# Patient Record
Sex: FEMALE | Race: WHITE | Employment: OTHER | URBAN - METROPOLITAN AREA
[De-identification: names, ages, dates, MRNs, and addresses within clinical notes are randomized per-mention and may not be internally consistent; named-entity substitution may affect disease eponyms.]

---

## 2018-11-12 ENCOUNTER — HOSPITAL ENCOUNTER (EMERGENCY)
Age: 73
Discharge: HOME | End: 2018-11-12
Payer: MEDICARE

## 2018-11-12 VITALS
WEIGHT: 150 LBS | BODY MASS INDEX: 31.49 KG/M2 | RESPIRATION RATE: 16 BRPM | DIASTOLIC BLOOD PRESSURE: 78 MMHG | HEIGHT: 58 IN | TEMPERATURE: 98.3 F | OXYGEN SATURATION: 98 % | SYSTOLIC BLOOD PRESSURE: 165 MMHG | HEART RATE: 91 BPM

## 2018-11-12 DIAGNOSIS — B02.9 HERPES ZOSTER WITHOUT COMPLICATION: Primary | ICD-10-CM

## 2018-11-12 PROCEDURE — 99282 EMERGENCY DEPT VISIT SF MDM: CPT

## 2018-11-12 RX ORDER — HYDROCODONE BITARTRATE AND ACETAMINOPHEN 5; 325 MG/1; MG/1
1 TABLET ORAL EVERY 6 HOURS PRN
Qty: 20 TABLET | Refills: 0 | Status: SHIPPED | OUTPATIENT
Start: 2018-11-12 | End: 2018-11-17

## 2018-11-12 RX ORDER — VALACYCLOVIR HYDROCHLORIDE 1 G/1
1000 TABLET, FILM COATED ORAL 3 TIMES DAILY
Qty: 30 TABLET | Refills: 0 | Status: SHIPPED | OUTPATIENT
Start: 2018-11-12 | End: 2018-11-22

## 2018-11-12 ASSESSMENT — PAIN DESCRIPTION - LOCATION: LOCATION: RIB CAGE

## 2018-11-12 ASSESSMENT — PAIN DESCRIPTION - PAIN TYPE: TYPE: ACUTE PAIN

## 2018-11-12 ASSESSMENT — PAIN DESCRIPTION - FREQUENCY: FREQUENCY: CONTINUOUS

## 2018-11-12 ASSESSMENT — PAIN DESCRIPTION - DESCRIPTORS: DESCRIPTORS: ACHING;ITCHING

## 2018-11-12 ASSESSMENT — PAIN SCALES - GENERAL: PAINLEVEL_OUTOF10: 8

## 2018-11-12 ASSESSMENT — PAIN DESCRIPTION - ORIENTATION: ORIENTATION: LEFT

## 2018-11-12 NOTE — ED PROVIDER NOTES
[facility]     Department of Emergency Medicine   ED  Provider Note  Admit Date/RoomTime: 11/12/2018  9:50 AM  ED Room: 29/29    CHIEF COMPLAINT:   Chief Complaint   Patient presents with    Rash     Started Monday/tuesday, rash on left side ribs with pain at area, itchy     ---------------------------------HISTORY OF PRESENT ILLNESS-----------------------------------     Corinna Guerin is a 68 y.o. female presenting to the ED for rash to left lateral chest that began a couple days ago. Patient states she first had pain to the area and then noticed a red rash. She describes the rash as painful and itchy. She denies any new soaps, lotions, detergent,s or medication. She has no chest pain, SOB, abdominal pain, nausea, vomiting, diarrhea, fever/chills, neck pain, sore throat, cough, or recent trauma/injury. She states she did have \"a cold\" at the beginning of this week. Patient is alert and oriented x3 and in no apparent distress. She is nontoxic appearing. Review of Systems:   Pertinent positives and negatives are stated within HPI, all other systems reviewed and are negative.    --------------------------------------------- PAST HISTORY ---------------------------------------------    Past Medical History:  has no past medical history on file. Past Surgical History:  has a past surgical history that includes Tonsillectomy and adenoidectomy. Social History:  reports that she has never smoked. She has never used smokeless tobacco. She reports that she does not drink alcohol or use drugs. Family History: family history is not on file. The patients home medications have been reviewed.     Allergies: Poultry meal and Pcn [penicillins]  Allergies have been reviewed with patient.     -------------------------------------------------- RESULTS -------------------------------------------------  All laboratory and radiology results have been personally reviewed by myself   LABS:  No results found for this visit on 11/12/18. RADIOLOGY:  Interpreted by Radiologist.  No orders to display     ------------------------- NURSING NOTES AND VITALS REVIEWED ---------------------------  The nursing notes within the ED encounter and vital signs as below have been reviewed. BP (!) 165/78   Pulse 91   Temp 98.3 °F (36.8 °C) (Oral)   Resp 16   Ht 4' 10\" (1.473 m)   Wt 150 lb (68 kg)   SpO2 98%   BMI 31.35 kg/m²   Oxygen Saturation Interpretation: Normal    ---------------------------------------------------PHYSICAL EXAM--------------------------------------    Constitutional/General: Alert and oriented x3, well appearing, NAD  HEENT: NC/AT, EOMI, Airway patent  Neck: Supple. Non-tender with no lymphadenopathy   CVS: Regular rate and rhythm  Resp: Clear and equal bilaterally with good airflow, no distress  Abdomen:  Abdomen soft, nontender, No guarding or rigidity   Back:  No midline tenderness. No CVA tenderness. Musculo: Moves all extremities x 4. Warm and well perfused, No edema   Integument:  Normal turgor. Warm, dry, herpetic rash noted to left flank and under left breast, rash renetta not extend past midline, pain with touch, no discharge   Neurological:  Motor functions intact. GCS 15, CN II-XII grossly intact     ------------------------------ ED COURSE/MEDICAL DECISION MAKING----------------------  ED Medications:  Medications - No data to display    Procedures:  None    Consultations:   None    Counseling: The emergency provider has spoken with the patient/caregiver and discussed todays results, in addition to providing specific details for the plan of care and counseling regarding the diagnosis and prognosis. Questions are answered at this time and they are agreeable with the plan. All results reviewed with pt and all questions answered. Patient understands that they must follow-up with PCP. Patient was advised to return to ED if symptoms worsen or new symptoms develop.  Pt remained nontoxic, afebrile, and A&O x4 during this ED visit. They agreed with plan of care, discharge, and importance of follow-up. Pt was in no distress at discharge. Vitals stable. Patient was educated on newly prescribed medication.      --------------------------------- IMPRESSION AND DISPOSITION ---------------------------------    IMPRESSION  1. Herpes zoster without complication      DISPOSITION  Discharge to home  Patient condition is good    NEW MEDICATIONS   Discharge Medication List as of 11/12/2018 10:27 AM      START taking these medications    Details   valACYclovir (VALTREX) 1 g tablet Take 1 tablet by mouth 3 times daily for 10 days, Disp-30 tablet, R-0Print      HYDROcodone-acetaminophen (NORCO) 5-325 MG per tablet Take 1 tablet by mouth every 6 hours as needed for Pain for up to 5 days. ., Disp-20 tablet, R-0Print             NOTE: This report was transcribed using voice recognition software.  Every effort was made to ensure accuracy; however, inadvertent computerized transcription errors may be present    END OF PROVIDER NOTE         Luis Hagan PA-C  11/12/18 [id number]

## 2018-11-20 ENCOUNTER — OFFICE VISIT (OUTPATIENT)
Dept: FAMILY MEDICINE CLINIC | Age: 73
End: 2018-11-20
Payer: MEDICARE

## 2018-11-20 VITALS
WEIGHT: 160 LBS | OXYGEN SATURATION: 99 % | SYSTOLIC BLOOD PRESSURE: 184 MMHG | RESPIRATION RATE: 16 BRPM | DIASTOLIC BLOOD PRESSURE: 86 MMHG | HEART RATE: 94 BPM | BODY MASS INDEX: 33.58 KG/M2 | HEIGHT: 58 IN

## 2018-11-20 DIAGNOSIS — Z12.11 SCREENING FOR MALIGNANT NEOPLASM OF COLON: ICD-10-CM

## 2018-11-20 DIAGNOSIS — Z00.00 ROUTINE PHYSICAL EXAMINATION: ICD-10-CM

## 2018-11-20 DIAGNOSIS — Z12.39 SCREENING FOR MALIGNANT NEOPLASM OF BREAST: ICD-10-CM

## 2018-11-20 DIAGNOSIS — Z13.220 SCREENING FOR LIPID DISORDERS: ICD-10-CM

## 2018-11-20 DIAGNOSIS — Z13.29 SCREENING FOR THYROID DISORDER: ICD-10-CM

## 2018-11-20 DIAGNOSIS — B02.9 HERPES ZOSTER WITHOUT COMPLICATION: Primary | ICD-10-CM

## 2018-11-20 PROCEDURE — 99203 OFFICE O/P NEW LOW 30 MIN: CPT | Performed by: FAMILY MEDICINE

## 2018-11-20 PROCEDURE — 82274 ASSAY TEST FOR BLOOD FECAL: CPT | Performed by: FAMILY MEDICINE

## 2018-11-20 ASSESSMENT — ENCOUNTER SYMPTOMS
CONSTIPATION: 0
BLOOD IN STOOL: 0
DIARRHEA: 0
COUGH: 0
SHORTNESS OF BREATH: 0
NAUSEA: 0
VOMITING: 0
ABDOMINAL PAIN: 0

## 2018-11-20 ASSESSMENT — PATIENT HEALTH QUESTIONNAIRE - PHQ9
SUM OF ALL RESPONSES TO PHQ9 QUESTIONS 1 & 2: 1
2. FEELING DOWN, DEPRESSED OR HOPELESS: 0
SUM OF ALL RESPONSES TO PHQ QUESTIONS 1-9: 1
SUM OF ALL RESPONSES TO PHQ QUESTIONS 1-9: 1
1. LITTLE INTEREST OR PLEASURE IN DOING THINGS: 1

## 2018-12-10 ENCOUNTER — HOSPITAL ENCOUNTER (OUTPATIENT)
Age: 73
Discharge: HOME | End: 2018-12-10
Payer: MEDICARE

## 2018-12-10 ENCOUNTER — HOSPITAL ENCOUNTER (EMERGENCY)
Age: 73
Discharge: HOME | End: 2018-12-10
Payer: MEDICARE

## 2018-12-10 VITALS
HEIGHT: 58 IN | TEMPERATURE: 98.2 F | HEART RATE: 88 BPM | BODY MASS INDEX: 34.85 KG/M2 | OXYGEN SATURATION: 97 % | SYSTOLIC BLOOD PRESSURE: 184 MMHG | DIASTOLIC BLOOD PRESSURE: 88 MMHG | WEIGHT: 166 LBS | RESPIRATION RATE: 18 BRPM

## 2018-12-10 DIAGNOSIS — E78.2 MIXED HYPERLIPIDEMIA: Primary | ICD-10-CM

## 2018-12-10 DIAGNOSIS — Z13.29 SCREENING FOR THYROID DISORDER: ICD-10-CM

## 2018-12-10 DIAGNOSIS — Z00.00 ROUTINE PHYSICAL EXAMINATION: ICD-10-CM

## 2018-12-10 DIAGNOSIS — Z13.220 SCREENING FOR LIPID DISORDERS: ICD-10-CM

## 2018-12-10 DIAGNOSIS — I10 ASYMPTOMATIC HYPERTENSION: Primary | ICD-10-CM

## 2018-12-10 LAB
ALBUMIN SERPL-MCNC: 4.4 G/DL (ref 3.5–5.2)
ALP BLD-CCNC: 97 U/L (ref 35–104)
ALT SERPL-CCNC: 18 U/L (ref 0–32)
ANION GAP SERPL CALCULATED.3IONS-SCNC: 14 MMOL/L (ref 7–16)
AST SERPL-CCNC: 24 U/L (ref 0–31)
BILIRUB SERPL-MCNC: 0.5 MG/DL (ref 0–1.2)
BUN BLDV-MCNC: 15 MG/DL (ref 8–23)
CALCIUM SERPL-MCNC: 9.5 MG/DL (ref 8.6–10.2)
CHLORIDE BLD-SCNC: 101 MMOL/L (ref 98–107)
CHOLESTEROL, TOTAL: 227 MG/DL (ref 0–199)
CO2: 25 MMOL/L (ref 22–29)
CREAT SERPL-MCNC: 0.8 MG/DL (ref 0.5–1)
GFR AFRICAN AMERICAN: >60
GFR NON-AFRICAN AMERICAN: >60 ML/MIN/1.73
GLUCOSE BLD-MCNC: 114 MG/DL (ref 74–99)
HCT VFR BLD CALC: 39.5 % (ref 34–48)
HDLC SERPL-MCNC: 59 MG/DL
HEMOGLOBIN: 12.7 G/DL (ref 11.5–15.5)
LDL CHOLESTEROL CALCULATED: 144 MG/DL (ref 0–99)
MCH RBC QN AUTO: 27.4 PG (ref 26–35)
MCHC RBC AUTO-ENTMCNC: 32.2 % (ref 32–34.5)
MCV RBC AUTO: 85.3 FL (ref 80–99.9)
PDW BLD-RTO: 16.7 FL (ref 11.5–15)
PLATELET # BLD: 218 E9/L (ref 130–450)
PMV BLD AUTO: 12.6 FL (ref 7–12)
POTASSIUM SERPL-SCNC: 3.4 MMOL/L (ref 3.5–5)
RBC # BLD: 4.63 E12/L (ref 3.5–5.5)
SODIUM BLD-SCNC: 140 MMOL/L (ref 132–146)
T4 TOTAL: 8.6 MCG/DL (ref 4.5–11.7)
TOTAL PROTEIN: 8 G/DL (ref 6.4–8.3)
TRIGL SERPL-MCNC: 120 MG/DL (ref 0–149)
TSH SERPL DL<=0.05 MIU/L-ACNC: 3.26 UIU/ML (ref 0.27–4.2)
VLDLC SERPL CALC-MCNC: 24 MG/DL
WBC # BLD: 6.9 E9/L (ref 4.5–11.5)

## 2018-12-10 PROCEDURE — 80061 LIPID PANEL: CPT

## 2018-12-10 PROCEDURE — 36415 COLL VENOUS BLD VENIPUNCTURE: CPT

## 2018-12-10 PROCEDURE — 85027 COMPLETE CBC AUTOMATED: CPT

## 2018-12-10 PROCEDURE — 99283 EMERGENCY DEPT VISIT LOW MDM: CPT

## 2018-12-10 PROCEDURE — 84436 ASSAY OF TOTAL THYROXINE: CPT

## 2018-12-10 PROCEDURE — 84443 ASSAY THYROID STIM HORMONE: CPT

## 2018-12-10 PROCEDURE — 80053 COMPREHEN METABOLIC PANEL: CPT

## 2018-12-10 RX ORDER — ATORVASTATIN CALCIUM 20 MG/1
20 TABLET, FILM COATED ORAL DAILY
Qty: 30 TABLET | Refills: 0 | Status: SHIPPED | OUTPATIENT
Start: 2018-12-10 | End: 2019-01-11 | Stop reason: SDUPTHER

## 2018-12-10 NOTE — ED NOTES
Patient discharged with belongings. Discussed care instructions, follow-up instructions and when to return to the hospital. Patient verbalizes understanding and has no further questions at this time. Electronically signed by Perla Keith RN on 12/10/2018 at [address] [place], [place] [address]STACEY  12/10/18 [id number]

## 2018-12-10 NOTE — ED PROVIDER NOTES
mother. Allergies: Poultry meal; Hydrocodone-acetaminophen; and Pcn [penicillins]    Physical Exam           ED Triage Vitals   BP Temp Temp Source Pulse Resp SpO2 Height Weight   12/10/18 1555 12/10/18 1554 12/10/18 1554 12/10/18 1554 12/10/18 1554 12/10/18 1554 12/10/18 1554 12/10/18 1554   (!) 191/77 98.2 °F (36.8 °C) Oral 100 18 97 % 4' 10\" (1.473 m) 166 lb (75.3 kg)      Oxygen Saturation Interpretation: Normal.    Constitutional:  Alert, development consistent with age. Eyes:  PERRL, EOMI, no discharge or conjunctival injection. Ears:  External ears without lesions. Throat:   Airway patient. Neck:  Normal ROM. Supple. Respiratory:  Clear to auscultation and breath sounds equal.  CV:  Regular rate and rhythm, normal heart sounds, without pathological murmurs, ectopy, gallops, or rubs. GI:  Abdomen Soft, nontender, good bowel sounds. No firm or pulsatile mass. Back:  No costovertebral tenderness. Integument:  Normal turgor. Warm, dry, without visible rash, unless noted elsewhere. Lymphatics: No lymphangitis or adenopathy noted. Neurological:  Oriented. Motor functions intact. CN 2-12 intact    Lab / Imaging Results   (All laboratory and radiology results have been personally reviewed by myself)  Labs:  No results found for this visit on 12/10/18. Imaging: All Radiology results interpreted by Radiologist unless otherwise noted. No orders to display     ED Course / Medical Decision Making   Medications - No data to display    Consultations:             None    Procedures:   none    MDM:  Pt was rechecked and BP was 184/88. Pt completely asymptomatic. Blood work was drawn today and kidney function was normal. I reviewed her chart from PCP visit 3 weeks ago and they stated they would be addressing her BP at the next visit. At this time, HTN will not be lowered acutely and patient is stable for discahrge and was advised to follow up with PCP. She has an appointment in 4 days.  I advised her to call

## 2018-12-11 ENCOUNTER — HOSPITAL ENCOUNTER (OUTPATIENT)
Dept: MAMMOGRAPHY | Age: 73
Discharge: HOME | End: 2018-12-13
Payer: MEDICARE

## 2018-12-11 DIAGNOSIS — Z12.39 SCREENING FOR MALIGNANT NEOPLASM OF BREAST: ICD-10-CM

## 2018-12-11 DIAGNOSIS — Z12.31 ENCOUNTER FOR SCREENING MAMMOGRAM FOR MALIGNANT NEOPLASM OF BREAST: ICD-10-CM

## 2018-12-11 PROCEDURE — 77067 SCR MAMMO BI INCL CAD: CPT

## 2018-12-13 ENCOUNTER — TELEPHONE (OUTPATIENT)
Dept: ONCOLOGY | Age: 73
End: 2018-12-13

## 2018-12-14 ENCOUNTER — OFFICE VISIT (OUTPATIENT)
Dept: FAMILY MEDICINE CLINIC | Age: 73
End: 2018-12-14
Payer: MEDICARE

## 2018-12-14 VITALS
BODY MASS INDEX: 34.43 KG/M2 | DIASTOLIC BLOOD PRESSURE: 74 MMHG | OXYGEN SATURATION: 99 % | HEART RATE: 102 BPM | SYSTOLIC BLOOD PRESSURE: 166 MMHG | RESPIRATION RATE: 12 BRPM | HEIGHT: 58 IN | WEIGHT: 164 LBS | TEMPERATURE: 98.3 F

## 2018-12-14 DIAGNOSIS — I10 ESSENTIAL HYPERTENSION: ICD-10-CM

## 2018-12-14 DIAGNOSIS — E87.6 HYPOKALEMIA: ICD-10-CM

## 2018-12-14 DIAGNOSIS — B02.9 HERPES ZOSTER WITHOUT COMPLICATION: Primary | ICD-10-CM

## 2018-12-14 LAB
CONTROL: NORMAL
HEMOCCULT STL QL: NEGATIVE

## 2018-12-14 PROCEDURE — 99213 OFFICE O/P EST LOW 20 MIN: CPT | Performed by: FAMILY MEDICINE

## 2018-12-14 RX ORDER — CHLORTHALIDONE 25 MG/1
25 TABLET ORAL DAILY
Qty: 30 TABLET | Refills: 3 | Status: SHIPPED | OUTPATIENT
Start: 2018-12-14 | End: 2019-03-31 | Stop reason: SDUPTHER

## 2018-12-14 RX ORDER — POTASSIUM CHLORIDE 20 MEQ/1
20 TABLET, EXTENDED RELEASE ORAL DAILY
Qty: 30 TABLET | Refills: 5 | Status: SHIPPED | OUTPATIENT
Start: 2018-12-14 | End: 2019-01-07 | Stop reason: SDUPTHER

## 2018-12-14 ASSESSMENT — ENCOUNTER SYMPTOMS
COUGH: 0
NAUSEA: 0
CONSTIPATION: 0
ABDOMINAL PAIN: 0
VOMITING: 0
SHORTNESS OF BREATH: 0
DIARRHEA: 0

## 2018-12-14 NOTE — PATIENT INSTRUCTIONS
1) start Chlorthalidone 25 mg daily  2) start potassium 20 mEq daily  3) recheck blood pressure in 2 weeks at nursing visit   4) return to office in 1 month     Patient Education        High Blood Pressure: Care Instructions  Your Care Instructions    If your blood pressure is usually above 130/80, you have high blood pressure, or hypertension. That means the top number is 130 or higher or the bottom number is 80 or higher, or both. Despite what a lot of people think, high blood pressure usually doesn't cause headaches or make you feel dizzy or lightheaded. It usually has no symptoms. But it does increase your risk for heart attack, stroke, and kidney or eye damage. The higher your blood pressure, the more your risk increases. Your doctor will give you a goal for your blood pressure. Your goal will be based on your health and your age. Lifestyle changes, such as eating healthy and being active, are always important to help lower blood pressure. You might also take medicine to reach your blood pressure goal.  Follow-up care is a key part of your treatment and safety. Be sure to make and go to all appointments, and call your doctor if you are having problems. It's also a good idea to know your test results and keep a list of the medicines you take. How can you care for yourself at home? Medical treatment  · If you stop taking your medicine, your blood pressure will go back up. You may take one or more types of medicine to lower your blood pressure. Be safe with medicines. Take your medicine exactly as prescribed. Call your doctor if you think you are having a problem with your medicine. · Talk to your doctor before you start taking aspirin every day. Aspirin can help certain people lower their risk of a heart attack or stroke. But taking aspirin isn't right for everyone, because it can cause serious bleeding. · See your doctor regularly.  You may need to see the doctor more often at first or until your blood of this information.

## 2018-12-15 ENCOUNTER — TELEPHONE (OUTPATIENT)
Dept: FAMILY MEDICINE CLINIC | Age: 73
End: 2018-12-15

## 2018-12-18 ENCOUNTER — HOSPITAL ENCOUNTER (OUTPATIENT)
Dept: GENERAL RADIOLOGY | Age: 73
Discharge: HOME | End: 2018-12-20
Payer: MEDICARE

## 2018-12-18 DIAGNOSIS — R92.1 CALCIFICATION OF LEFT BREAST: ICD-10-CM

## 2018-12-18 PROCEDURE — 77065 DX MAMMO INCL CAD UNI: CPT

## 2018-12-18 PROCEDURE — 88341 IMHCHEM/IMCYTCHM EA ADD ANTB: CPT

## 2018-12-18 PROCEDURE — 88360 TUMOR IMMUNOHISTOCHEM/MANUAL: CPT

## 2018-12-18 PROCEDURE — 88342 IMHCHEM/IMCYTCHM 1ST ANTB: CPT

## 2018-12-18 PROCEDURE — 2720000010 MAM STEREO BREAST BX W LOC DEVICE 1ST LESION LEFT

## 2018-12-18 PROCEDURE — 76098 X-RAY EXAM SURGICAL SPECIMEN: CPT

## 2018-12-18 PROCEDURE — 2500000003 HC RX 250 WO HCPCS

## 2018-12-18 PROCEDURE — 88305 TISSUE EXAM BY PATHOLOGIST: CPT

## 2018-12-26 ENCOUNTER — TELEPHONE (OUTPATIENT)
Dept: FAMILY MEDICINE CLINIC | Age: 73
End: 2018-12-26

## 2018-12-26 ENCOUNTER — TELEPHONE (OUTPATIENT)
Dept: ONCOLOGY | Age: 73
End: 2018-12-26

## 2018-12-26 DIAGNOSIS — D05.12 DUCTAL CARCINOMA IN SITU (DCIS) OF LEFT BREAST: Primary | ICD-10-CM

## 2018-12-26 NOTE — TELEPHONE ENCOUNTER
Called patient regarding her recent breast biopsy results. Instructed in detail on her breast biopsy pathology findings including cancer type and hormone receptor status. Instructed on next steps including breast surgery consultation. Patient will be mailed a packet today with extensive literature including \"Be A Survivor: Your guide to breast cancer treatment\", Clinical Trials Brochure, Exercises after breast surgery, ACS Reach to Recovery Program, BRCA testing brochure, local support group list, and Pink Ribbon exercise program information. Provided with my contact information and instructed patient to call me with questions or concerns. Verbalizes understanding.

## 2018-12-28 ENCOUNTER — TELEPHONE (OUTPATIENT)
Dept: FAMILY MEDICINE CLINIC | Age: 73
End: 2018-12-28

## 2018-12-28 ENCOUNTER — NURSE ONLY (OUTPATIENT)
Dept: FAMILY MEDICINE CLINIC | Age: 73
End: 2018-12-28
Payer: MEDICARE

## 2018-12-28 ENCOUNTER — HOSPITAL ENCOUNTER (OUTPATIENT)
Age: 73
Discharge: HOME | End: 2018-12-28
Payer: MEDICARE

## 2018-12-28 VITALS
SYSTOLIC BLOOD PRESSURE: 128 MMHG | HEART RATE: 59 BPM | BODY MASS INDEX: 33.23 KG/M2 | DIASTOLIC BLOOD PRESSURE: 64 MMHG | WEIGHT: 159 LBS

## 2018-12-28 DIAGNOSIS — Z23 NEED FOR INFLUENZA VACCINATION: Primary | ICD-10-CM

## 2018-12-28 DIAGNOSIS — E87.6 HYPOKALEMIA: Primary | ICD-10-CM

## 2018-12-28 DIAGNOSIS — I10 ESSENTIAL HYPERTENSION: ICD-10-CM

## 2018-12-28 DIAGNOSIS — Z23 NEED FOR TDAP VACCINATION: ICD-10-CM

## 2018-12-28 LAB
ANION GAP SERPL CALCULATED.3IONS-SCNC: 13 MMOL/L (ref 7–16)
BUN BLDV-MCNC: 15 MG/DL (ref 8–23)
CALCIUM SERPL-MCNC: 9.8 MG/DL (ref 8.6–10.2)
CHLORIDE BLD-SCNC: 98 MMOL/L (ref 98–107)
CO2: 28 MMOL/L (ref 22–29)
CREAT SERPL-MCNC: 0.8 MG/DL (ref 0.5–1)
GFR AFRICAN AMERICAN: >60
GFR NON-AFRICAN AMERICAN: >60 ML/MIN/1.73
GLUCOSE BLD-MCNC: 97 MG/DL (ref 74–99)
POTASSIUM SERPL-SCNC: 2.9 MMOL/L (ref 3.5–5)
SODIUM BLD-SCNC: 139 MMOL/L (ref 132–146)

## 2018-12-28 PROCEDURE — 90715 TDAP VACCINE 7 YRS/> IM: CPT | Performed by: FAMILY MEDICINE

## 2018-12-28 PROCEDURE — 36415 COLL VENOUS BLD VENIPUNCTURE: CPT

## 2018-12-28 PROCEDURE — 80048 BASIC METABOLIC PNL TOTAL CA: CPT

## 2018-12-28 PROCEDURE — 90662 IIV NO PRSV INCREASED AG IM: CPT | Performed by: FAMILY MEDICINE

## 2018-12-28 PROCEDURE — G0008 ADMIN INFLUENZA VIRUS VAC: HCPCS | Performed by: FAMILY MEDICINE

## 2018-12-28 PROCEDURE — 90471 IMMUNIZATION ADMIN: CPT | Performed by: FAMILY MEDICINE

## 2018-12-28 NOTE — PROGRESS NOTES
Patient said that she wanted to get the Flu and TDap vaccines today. Patient said that she does not have any reaction with eggs, due to since she was diagnosed with an allergy to poultry she just did not eat them. Patient was advised of the possible side effects to watch for with the Flu Vaccine. Vaccine Information Sheet, \"Influenza - Inactivated\"  given to Corinna Guerin, or parent/legal guardian of  Corinna Guerin and verbalized understanding. Patient responses:    Have you ever had a reaction to a flu vaccine? No  Are you able to eat eggs without adverse effects? Patient has an allergy to poultry, so she never ate eggs after finding this out. Do you have any current illness? No  Have you ever had Guillian Vergennes Syndrome? No    Flu vaccine given per order. Please see immunization tab.

## 2019-01-04 ENCOUNTER — HOSPITAL ENCOUNTER (OUTPATIENT)
Age: 74
Discharge: HOME | End: 2019-01-04
Payer: MEDICARE

## 2019-01-04 DIAGNOSIS — E87.6 HYPOKALEMIA: ICD-10-CM

## 2019-01-04 LAB
ANION GAP SERPL CALCULATED.3IONS-SCNC: 15 MMOL/L (ref 7–16)
BUN BLDV-MCNC: 16 MG/DL (ref 8–23)
CALCIUM SERPL-MCNC: 9.6 MG/DL (ref 8.6–10.2)
CHLORIDE BLD-SCNC: 97 MMOL/L (ref 98–107)
CO2: 26 MMOL/L (ref 22–29)
CREAT SERPL-MCNC: 0.8 MG/DL (ref 0.5–1)
GFR AFRICAN AMERICAN: >60
GFR NON-AFRICAN AMERICAN: >60 ML/MIN/1.73
GLUCOSE BLD-MCNC: 143 MG/DL (ref 74–99)
POTASSIUM SERPL-SCNC: 3.4 MMOL/L (ref 3.5–5)
SODIUM BLD-SCNC: 138 MMOL/L (ref 132–146)

## 2019-01-04 PROCEDURE — 36415 COLL VENOUS BLD VENIPUNCTURE: CPT

## 2019-01-04 PROCEDURE — 80048 BASIC METABOLIC PNL TOTAL CA: CPT

## 2019-01-07 ENCOUNTER — TELEPHONE (OUTPATIENT)
Dept: FAMILY MEDICINE CLINIC | Age: 74
End: 2019-01-07

## 2019-01-07 DIAGNOSIS — E87.6 HYPOKALEMIA: Primary | ICD-10-CM

## 2019-01-07 DIAGNOSIS — I10 ESSENTIAL HYPERTENSION: ICD-10-CM

## 2019-01-07 RX ORDER — POTASSIUM CHLORIDE 20 MEQ/1
20 TABLET, EXTENDED RELEASE ORAL 2 TIMES DAILY
Qty: 60 TABLET | Refills: 5 | Status: SHIPPED | OUTPATIENT
Start: 2019-01-07 | End: 2019-06-05 | Stop reason: SDUPTHER

## 2019-01-10 PROBLEM — D05.12 DUCTAL CARCINOMA IN SITU (DCIS) OF LEFT BREAST: Status: ACTIVE | Noted: 2019-01-10

## 2019-01-11 ENCOUNTER — OFFICE VISIT (OUTPATIENT)
Dept: FAMILY MEDICINE CLINIC | Age: 74
End: 2019-01-11
Payer: MEDICARE

## 2019-01-11 VITALS
HEART RATE: 96 BPM | WEIGHT: 158 LBS | SYSTOLIC BLOOD PRESSURE: 144 MMHG | RESPIRATION RATE: 18 BRPM | DIASTOLIC BLOOD PRESSURE: 70 MMHG | HEIGHT: 58 IN | BODY MASS INDEX: 33.17 KG/M2 | OXYGEN SATURATION: 96 %

## 2019-01-11 DIAGNOSIS — D05.12 DUCTAL CARCINOMA IN SITU (DCIS) OF LEFT BREAST: ICD-10-CM

## 2019-01-11 DIAGNOSIS — E78.2 MIXED HYPERLIPIDEMIA: ICD-10-CM

## 2019-01-11 DIAGNOSIS — Z23 NEED FOR PNEUMOCOCCAL VACCINATION: ICD-10-CM

## 2019-01-11 DIAGNOSIS — I10 ESSENTIAL HYPERTENSION: Primary | ICD-10-CM

## 2019-01-11 PROCEDURE — 90670 PCV13 VACCINE IM: CPT | Performed by: FAMILY MEDICINE

## 2019-01-11 PROCEDURE — G0009 ADMIN PNEUMOCOCCAL VACCINE: HCPCS | Performed by: FAMILY MEDICINE

## 2019-01-11 PROCEDURE — 99213 OFFICE O/P EST LOW 20 MIN: CPT | Performed by: FAMILY MEDICINE

## 2019-01-11 RX ORDER — ATORVASTATIN CALCIUM 20 MG/1
20 TABLET, FILM COATED ORAL DAILY
Qty: 90 TABLET | Refills: 1 | Status: SHIPPED | OUTPATIENT
Start: 2019-01-11 | End: 2019-01-11 | Stop reason: SDUPTHER

## 2019-01-11 RX ORDER — ATORVASTATIN CALCIUM 20 MG/1
40 TABLET, FILM COATED ORAL DAILY
Qty: 90 TABLET | Refills: 1 | Status: SHIPPED | OUTPATIENT
Start: 2019-01-11 | End: 2019-01-11 | Stop reason: SDUPTHER

## 2019-01-11 RX ORDER — ATORVASTATIN CALCIUM 40 MG/1
40 TABLET, FILM COATED ORAL DAILY
Qty: 90 TABLET | Refills: 1 | Status: SHIPPED | OUTPATIENT
Start: 2019-01-11 | End: 2019-06-05 | Stop reason: SDUPTHER

## 2019-01-11 ASSESSMENT — ENCOUNTER SYMPTOMS
COUGH: 0
ABDOMINAL PAIN: 0
VOMITING: 0
CONSTIPATION: 0
DIARRHEA: 0
SHORTNESS OF BREATH: 0
NAUSEA: 0

## 2019-01-15 ENCOUNTER — HOSPITAL ENCOUNTER (OUTPATIENT)
Age: 74
Discharge: HOME | End: 2019-01-15
Payer: MEDICARE

## 2019-01-15 DIAGNOSIS — E87.6 HYPOKALEMIA: ICD-10-CM

## 2019-01-15 LAB
ANION GAP SERPL CALCULATED.3IONS-SCNC: 13 MMOL/L (ref 7–16)
BUN BLDV-MCNC: 16 MG/DL (ref 8–23)
CALCIUM SERPL-MCNC: 9.1 MG/DL (ref 8.6–10.2)
CHLORIDE BLD-SCNC: 98 MMOL/L (ref 98–107)
CO2: 25 MMOL/L (ref 22–29)
CREAT SERPL-MCNC: 0.7 MG/DL (ref 0.5–1)
GFR AFRICAN AMERICAN: >60
GFR NON-AFRICAN AMERICAN: >60 ML/MIN/1.73
GLUCOSE BLD-MCNC: 114 MG/DL (ref 74–99)
POTASSIUM SERPL-SCNC: 3.6 MMOL/L (ref 3.5–5)
SODIUM BLD-SCNC: 136 MMOL/L (ref 132–146)

## 2019-01-15 PROCEDURE — 36415 COLL VENOUS BLD VENIPUNCTURE: CPT

## 2019-01-15 PROCEDURE — 80048 BASIC METABOLIC PNL TOTAL CA: CPT

## 2019-01-18 ENCOUNTER — PREP FOR PROCEDURE (OUTPATIENT)
Dept: SURGERY | Age: 74
End: 2019-01-18

## 2019-01-18 ENCOUNTER — OFFICE VISIT (OUTPATIENT)
Dept: BREAST CENTER | Age: 74
End: 2019-01-18
Payer: MEDICARE

## 2019-01-18 VITALS
OXYGEN SATURATION: 98 % | RESPIRATION RATE: 14 BRPM | HEIGHT: 58 IN | HEART RATE: 100 BPM | TEMPERATURE: 99.3 F | SYSTOLIC BLOOD PRESSURE: 142 MMHG | DIASTOLIC BLOOD PRESSURE: 76 MMHG | WEIGHT: 155 LBS | BODY MASS INDEX: 32.54 KG/M2

## 2019-01-18 DIAGNOSIS — D05.12 DUCTAL CARCINOMA IN SITU (DCIS) OF LEFT BREAST: ICD-10-CM

## 2019-01-18 PROCEDURE — 99203 OFFICE O/P NEW LOW 30 MIN: CPT | Performed by: SURGERY

## 2019-01-18 PROCEDURE — 99204 OFFICE O/P NEW MOD 45 MIN: CPT | Performed by: SURGERY

## 2019-01-18 RX ORDER — SODIUM CHLORIDE 0.9 % (FLUSH) 0.9 %
10 SYRINGE (ML) INJECTION PRN
Status: CANCELLED | OUTPATIENT
Start: 2019-01-18

## 2019-01-18 RX ORDER — SODIUM CHLORIDE 0.9 % (FLUSH) 0.9 %
10 SYRINGE (ML) INJECTION EVERY 12 HOURS SCHEDULED
Status: CANCELLED | OUTPATIENT
Start: 2019-01-18

## 2019-01-18 RX ORDER — SODIUM CHLORIDE 9 MG/ML
INJECTION, SOLUTION INTRAVENOUS CONTINUOUS
Status: CANCELLED | OUTPATIENT
Start: 2019-01-18

## 2019-01-18 ASSESSMENT — ENCOUNTER SYMPTOMS
CONSTIPATION: 0
SHORTNESS OF BREATH: 0
COUGH: 0
GASTROINTESTINAL NEGATIVE: 1
ALLERGIC/IMMUNOLOGIC NEGATIVE: 1
CHEST TIGHTNESS: 0
RESPIRATORY NEGATIVE: 1
DIARRHEA: 0
EYES NEGATIVE: 1
BACK PAIN: 0

## 2019-01-22 ENCOUNTER — TELEPHONE (OUTPATIENT)
Dept: ONCOLOGY | Age: 74
End: 2019-01-22

## 2019-01-24 ENCOUNTER — TELEPHONE (OUTPATIENT)
Dept: BREAST CENTER | Age: 74
End: 2019-01-24

## 2019-02-05 ENCOUNTER — TELEPHONE (OUTPATIENT)
Dept: BREAST CENTER | Age: 74
End: 2019-02-05

## 2019-02-22 ENCOUNTER — HOSPITAL ENCOUNTER (OUTPATIENT)
Dept: NON INVASIVE DIAGNOSTICS | Age: 74
Discharge: HOME | End: 2019-02-22
Payer: MEDICARE

## 2019-02-22 LAB
EKG ATRIAL RATE: 95 BPM
EKG P AXIS: 45 DEGREES
EKG P-R INTERVAL: 166 MS
EKG Q-T INTERVAL: 380 MS
EKG QRS DURATION: 80 MS
EKG QTC CALCULATION (BAZETT): 477 MS
EKG R AXIS: -15 DEGREES
EKG T AXIS: 42 DEGREES
EKG VENTRICULAR RATE: 95 BPM

## 2019-02-22 PROCEDURE — 93010 ELECTROCARDIOGRAM REPORT: CPT | Performed by: INTERNAL MEDICINE

## 2019-02-22 PROCEDURE — 93005 ELECTROCARDIOGRAM TRACING: CPT | Performed by: SURGERY

## 2019-02-27 ENCOUNTER — HOSPITAL ENCOUNTER (OUTPATIENT)
Dept: GENERAL RADIOLOGY | Age: 74
Setting detail: OUTPATIENT SURGERY
Discharge: HOME | End: 2019-03-01
Attending: SURGERY
Payer: MEDICARE

## 2019-02-27 ENCOUNTER — APPOINTMENT (OUTPATIENT)
Dept: GENERAL RADIOLOGY | Age: 74
End: 2019-02-27
Attending: SURGERY
Payer: MEDICARE

## 2019-02-27 ENCOUNTER — ANESTHESIA EVENT (OUTPATIENT)
Dept: OPERATING ROOM | Age: 74
End: 2019-02-27
Payer: MEDICARE

## 2019-02-27 ENCOUNTER — HOSPITAL ENCOUNTER (OUTPATIENT)
Age: 74
Setting detail: OUTPATIENT SURGERY
Discharge: HOME | End: 2019-02-27
Attending: SURGERY | Admitting: SURGERY
Payer: MEDICARE

## 2019-02-27 ENCOUNTER — ANESTHESIA (OUTPATIENT)
Dept: OPERATING ROOM | Age: 74
End: 2019-02-27
Payer: MEDICARE

## 2019-02-27 VITALS
DIASTOLIC BLOOD PRESSURE: 78 MMHG | SYSTOLIC BLOOD PRESSURE: 154 MMHG | RESPIRATION RATE: 6 BRPM | OXYGEN SATURATION: 97 % | TEMPERATURE: 96.3 F

## 2019-02-27 VITALS
HEART RATE: 75 BPM | WEIGHT: 156 LBS | OXYGEN SATURATION: 100 % | TEMPERATURE: 97 F | RESPIRATION RATE: 16 BRPM | SYSTOLIC BLOOD PRESSURE: 147 MMHG | DIASTOLIC BLOOD PRESSURE: 65 MMHG | HEIGHT: 58 IN | BODY MASS INDEX: 32.75 KG/M2

## 2019-02-27 DIAGNOSIS — I10 ESSENTIAL HYPERTENSION: ICD-10-CM

## 2019-02-27 DIAGNOSIS — G89.18 POST-OPERATIVE PAIN: ICD-10-CM

## 2019-02-27 DIAGNOSIS — D05.12 DUCTAL CARCINOMA IN SITU (DCIS) OF LEFT BREAST: Primary | ICD-10-CM

## 2019-02-27 LAB
ANION GAP SERPL CALCULATED.3IONS-SCNC: 14 MMOL/L (ref 7–16)
BUN BLDV-MCNC: 13 MG/DL (ref 8–23)
CALCIUM SERPL-MCNC: 9.8 MG/DL (ref 8.6–10.2)
CHLORIDE BLD-SCNC: 98 MMOL/L (ref 98–107)
CO2: 25 MMOL/L (ref 22–29)
CREAT SERPL-MCNC: 0.7 MG/DL (ref 0.5–1)
EKG ATRIAL RATE: 86 BPM
EKG P AXIS: 38 DEGREES
EKG P-R INTERVAL: 168 MS
EKG Q-T INTERVAL: 408 MS
EKG QRS DURATION: 86 MS
EKG QTC CALCULATION (BAZETT): 488 MS
EKG R AXIS: -10 DEGREES
EKG T AXIS: 39 DEGREES
EKG VENTRICULAR RATE: 86 BPM
GFR AFRICAN AMERICAN: >60
GFR NON-AFRICAN AMERICAN: >60 ML/MIN/1.73
GLUCOSE BLD-MCNC: 112 MG/DL (ref 74–99)
HCT VFR BLD CALC: 36.8 % (ref 34–48)
HEMOGLOBIN: 11.9 G/DL (ref 11.5–15.5)
MCH RBC QN AUTO: 26.9 PG (ref 26–35)
MCHC RBC AUTO-ENTMCNC: 32.3 % (ref 32–34.5)
MCV RBC AUTO: 83.1 FL (ref 80–99.9)
PDW BLD-RTO: 15.6 FL (ref 11.5–15)
PLATELET # BLD: 250 E9/L (ref 130–450)
PMV BLD AUTO: 11.9 FL (ref 7–12)
POTASSIUM SERPL-SCNC: 2.8 MMOL/L (ref 3.5–5)
RBC # BLD: 4.43 E12/L (ref 3.5–5.5)
SODIUM BLD-SCNC: 137 MMOL/L (ref 132–146)
WBC # BLD: 8.2 E9/L (ref 4.5–11.5)

## 2019-02-27 PROCEDURE — 2720000010 HC SURG SUPPLY STERILE: Performed by: SURGERY

## 2019-02-27 PROCEDURE — 2500000003 HC RX 250 WO HCPCS: Performed by: NURSE ANESTHETIST, CERTIFIED REGISTERED

## 2019-02-27 PROCEDURE — 85027 COMPLETE CBC AUTOMATED: CPT

## 2019-02-27 PROCEDURE — 2580000003 HC RX 258: Performed by: NURSE ANESTHETIST, CERTIFIED REGISTERED

## 2019-02-27 PROCEDURE — 2500000003 HC RX 250 WO HCPCS

## 2019-02-27 PROCEDURE — 2709999900 HC NON-CHARGEABLE SUPPLY: Performed by: SURGERY

## 2019-02-27 PROCEDURE — 3600000013 HC SURGERY LEVEL 3 ADDTL 15MIN: Performed by: SURGERY

## 2019-02-27 PROCEDURE — 7100000010 HC PHASE II RECOVERY - FIRST 15 MIN: Performed by: SURGERY

## 2019-02-27 PROCEDURE — 3600000003 HC SURGERY LEVEL 3 BASE: Performed by: SURGERY

## 2019-02-27 PROCEDURE — 7100000011 HC PHASE II RECOVERY - ADDTL 15 MIN: Performed by: SURGERY

## 2019-02-27 PROCEDURE — 19301 PARTIAL MASTECTOMY: CPT | Performed by: SURGERY

## 2019-02-27 PROCEDURE — 7100000001 HC PACU RECOVERY - ADDTL 15 MIN: Performed by: SURGERY

## 2019-02-27 PROCEDURE — 19340 INSJ BREAST IMPLT SM D MAST: CPT | Performed by: SURGERY

## 2019-02-27 PROCEDURE — 88342 IMHCHEM/IMCYTCHM 1ST ANTB: CPT

## 2019-02-27 PROCEDURE — 3700000000 HC ANESTHESIA ATTENDED CARE: Performed by: SURGERY

## 2019-02-27 PROCEDURE — 80048 BASIC METABOLIC PNL TOTAL CA: CPT

## 2019-02-27 PROCEDURE — 19281 PERQ DEVICE BREAST 1ST IMAG: CPT

## 2019-02-27 PROCEDURE — 6360000002 HC RX W HCPCS: Performed by: SURGERY

## 2019-02-27 PROCEDURE — 6360000002 HC RX W HCPCS: Performed by: NURSE ANESTHETIST, CERTIFIED REGISTERED

## 2019-02-27 PROCEDURE — 3700000001 HC ADD 15 MINUTES (ANESTHESIA): Performed by: SURGERY

## 2019-02-27 PROCEDURE — A4648 IMPLANTABLE TISSUE MARKER: HCPCS | Performed by: SURGERY

## 2019-02-27 PROCEDURE — 88307 TISSUE EXAM BY PATHOLOGIST: CPT

## 2019-02-27 PROCEDURE — 93010 ELECTROCARDIOGRAM REPORT: CPT | Performed by: INTERNAL MEDICINE

## 2019-02-27 PROCEDURE — 88341 IMHCHEM/IMCYTCHM EA ADD ANTB: CPT

## 2019-02-27 PROCEDURE — 36415 COLL VENOUS BLD VENIPUNCTURE: CPT

## 2019-02-27 PROCEDURE — 2500000003 HC RX 250 WO HCPCS: Performed by: SURGERY

## 2019-02-27 PROCEDURE — 7100000000 HC PACU RECOVERY - FIRST 15 MIN: Performed by: SURGERY

## 2019-02-27 PROCEDURE — 93005 ELECTROCARDIOGRAM TRACING: CPT | Performed by: SURGERY

## 2019-02-27 PROCEDURE — 76098 X-RAY EXAM SURGICAL SPECIMEN: CPT

## 2019-02-27 DEVICE — THE MARKER IS A RADIOGRAPHIC IMPLANTABLE MARKER USED TO MARK SOFT TISSUE.IT IS COMPRISED OF A BIOABSORBABLE SPACER THAT HOLDS RADIOPAQUE MARKER CLIPS.
Type: IMPLANTABLE DEVICE | Site: BREAST | Status: FUNCTIONAL
Brand: BIOZORB MARKER

## 2019-02-27 RX ORDER — LIDOCAINE HYDROCHLORIDE 20 MG/ML
INJECTION, SOLUTION INFILTRATION; PERINEURAL PRN
Status: DISCONTINUED | OUTPATIENT
Start: 2019-02-27 | End: 2019-02-27 | Stop reason: SDUPTHER

## 2019-02-27 RX ORDER — OXYCODONE HYDROCHLORIDE AND ACETAMINOPHEN 5; 325 MG/1; MG/1
1 TABLET ORAL EVERY 6 HOURS PRN
Qty: 20 TABLET | Refills: 0 | Status: SHIPPED | OUTPATIENT
Start: 2019-02-27 | End: 2019-03-04

## 2019-02-27 RX ORDER — ONDANSETRON 2 MG/ML
INJECTION INTRAMUSCULAR; INTRAVENOUS PRN
Status: DISCONTINUED | OUTPATIENT
Start: 2019-02-27 | End: 2019-02-27 | Stop reason: SDUPTHER

## 2019-02-27 RX ORDER — ROCURONIUM BROMIDE 10 MG/ML
INJECTION, SOLUTION INTRAVENOUS PRN
Status: DISCONTINUED | OUTPATIENT
Start: 2019-02-27 | End: 2019-02-27 | Stop reason: SDUPTHER

## 2019-02-27 RX ORDER — KETOROLAC TROMETHAMINE 30 MG/ML
INJECTION, SOLUTION INTRAMUSCULAR; INTRAVENOUS PRN
Status: DISCONTINUED | OUTPATIENT
Start: 2019-02-27 | End: 2019-02-27 | Stop reason: SDUPTHER

## 2019-02-27 RX ORDER — MIDAZOLAM HYDROCHLORIDE 1 MG/ML
INJECTION INTRAMUSCULAR; INTRAVENOUS PRN
Status: DISCONTINUED | OUTPATIENT
Start: 2019-02-27 | End: 2019-02-27 | Stop reason: SDUPTHER

## 2019-02-27 RX ORDER — SODIUM CHLORIDE 9 MG/ML
INJECTION, SOLUTION INTRAVENOUS CONTINUOUS
Status: DISCONTINUED | OUTPATIENT
Start: 2019-02-27 | End: 2019-02-27 | Stop reason: HOSPADM

## 2019-02-27 RX ORDER — PROPOFOL 10 MG/ML
INJECTION, EMULSION INTRAVENOUS PRN
Status: DISCONTINUED | OUTPATIENT
Start: 2019-02-27 | End: 2019-02-27 | Stop reason: SDUPTHER

## 2019-02-27 RX ORDER — DEXAMETHASONE SODIUM PHOSPHATE 10 MG/ML
INJECTION, SOLUTION INTRAMUSCULAR; INTRAVENOUS PRN
Status: DISCONTINUED | OUTPATIENT
Start: 2019-02-27 | End: 2019-02-27 | Stop reason: SDUPTHER

## 2019-02-27 RX ORDER — LABETALOL HYDROCHLORIDE 5 MG/ML
INJECTION, SOLUTION INTRAVENOUS PRN
Status: DISCONTINUED | OUTPATIENT
Start: 2019-02-27 | End: 2019-02-27 | Stop reason: SDUPTHER

## 2019-02-27 RX ORDER — LABETALOL HYDROCHLORIDE 5 MG/ML
5 INJECTION, SOLUTION INTRAVENOUS EVERY 10 MIN PRN
Status: DISCONTINUED | OUTPATIENT
Start: 2019-02-27 | End: 2019-02-27 | Stop reason: HOSPADM

## 2019-02-27 RX ORDER — SODIUM CHLORIDE 0.9 % (FLUSH) 0.9 %
10 SYRINGE (ML) INJECTION EVERY 12 HOURS SCHEDULED
Status: DISCONTINUED | OUTPATIENT
Start: 2019-02-27 | End: 2019-02-27 | Stop reason: HOSPADM

## 2019-02-27 RX ORDER — ONDANSETRON 2 MG/ML
4 INJECTION INTRAMUSCULAR; INTRAVENOUS
Status: DISCONTINUED | OUTPATIENT
Start: 2019-02-27 | End: 2019-02-27 | Stop reason: HOSPADM

## 2019-02-27 RX ORDER — FENTANYL CITRATE 50 UG/ML
25 INJECTION, SOLUTION INTRAMUSCULAR; INTRAVENOUS EVERY 5 MIN PRN
Status: DISCONTINUED | OUTPATIENT
Start: 2019-02-27 | End: 2019-02-27 | Stop reason: HOSPADM

## 2019-02-27 RX ORDER — DIPHENHYDRAMINE HYDROCHLORIDE 50 MG/ML
12.5 INJECTION INTRAMUSCULAR; INTRAVENOUS
Status: DISCONTINUED | OUTPATIENT
Start: 2019-02-27 | End: 2019-02-27 | Stop reason: HOSPADM

## 2019-02-27 RX ORDER — SODIUM CHLORIDE 9 MG/ML
INJECTION, SOLUTION INTRAVENOUS CONTINUOUS PRN
Status: DISCONTINUED | OUTPATIENT
Start: 2019-02-27 | End: 2019-02-27 | Stop reason: SDUPTHER

## 2019-02-27 RX ORDER — FENTANYL CITRATE 50 UG/ML
INJECTION, SOLUTION INTRAMUSCULAR; INTRAVENOUS PRN
Status: DISCONTINUED | OUTPATIENT
Start: 2019-02-27 | End: 2019-02-27 | Stop reason: SDUPTHER

## 2019-02-27 RX ORDER — FENTANYL CITRATE 50 UG/ML
50 INJECTION, SOLUTION INTRAMUSCULAR; INTRAVENOUS EVERY 5 MIN PRN
Status: DISCONTINUED | OUTPATIENT
Start: 2019-02-27 | End: 2019-02-27 | Stop reason: HOSPADM

## 2019-02-27 RX ORDER — MEPERIDINE HYDROCHLORIDE 50 MG/ML
12.5 INJECTION INTRAMUSCULAR; INTRAVENOUS; SUBCUTANEOUS EVERY 5 MIN PRN
Status: DISCONTINUED | OUTPATIENT
Start: 2019-02-27 | End: 2019-02-27 | Stop reason: HOSPADM

## 2019-02-27 RX ORDER — CEFAZOLIN SODIUM 2 G/50ML
2 SOLUTION INTRAVENOUS
Status: COMPLETED | OUTPATIENT
Start: 2019-02-27 | End: 2019-02-27

## 2019-02-27 RX ORDER — BUPIVACAINE HYDROCHLORIDE AND EPINEPHRINE 2.5; 5 MG/ML; UG/ML
INJECTION, SOLUTION EPIDURAL; INFILTRATION; INTRACAUDAL; PERINEURAL PRN
Status: DISCONTINUED | OUTPATIENT
Start: 2019-02-27 | End: 2019-02-27 | Stop reason: ALTCHOICE

## 2019-02-27 RX ORDER — SODIUM CHLORIDE 0.9 % (FLUSH) 0.9 %
10 SYRINGE (ML) INJECTION PRN
Status: DISCONTINUED | OUTPATIENT
Start: 2019-02-27 | End: 2019-02-27 | Stop reason: HOSPADM

## 2019-02-27 RX ADMIN — LIDOCAINE HYDROCHLORIDE 80 MG: 20 INJECTION, SOLUTION INFILTRATION; PERINEURAL at 10:08

## 2019-02-27 RX ADMIN — PROPOFOL 150 MG: 10 INJECTION, EMULSION INTRAVENOUS at 10:08

## 2019-02-27 RX ADMIN — DEXAMETHASONE SODIUM PHOSPHATE 10 MG: 10 INJECTION INTRAMUSCULAR; INTRAVENOUS at 10:08

## 2019-02-27 RX ADMIN — SODIUM CHLORIDE: 9 INJECTION, SOLUTION INTRAVENOUS at 10:00

## 2019-02-27 RX ADMIN — KETOROLAC TROMETHAMINE 15 MG: 30 INJECTION, SOLUTION INTRAMUSCULAR at 11:32

## 2019-02-27 RX ADMIN — FENTANYL CITRATE 100 MCG: 50 INJECTION, SOLUTION INTRAMUSCULAR; INTRAVENOUS at 10:08

## 2019-02-27 RX ADMIN — MIDAZOLAM HYDROCHLORIDE 2 MG: 1 INJECTION, SOLUTION INTRAMUSCULAR; INTRAVENOUS at 10:02

## 2019-02-27 RX ADMIN — LABETALOL HYDROCHLORIDE 5 MG: 5 INJECTION INTRAVENOUS at 11:42

## 2019-02-27 RX ADMIN — ROCURONIUM BROMIDE 20 MG: 10 INJECTION, SOLUTION INTRAVENOUS at 10:08

## 2019-02-27 RX ADMIN — CEFAZOLIN SODIUM 2 G: 2 SOLUTION INTRAVENOUS at 10:00

## 2019-02-27 RX ADMIN — ONDANSETRON HYDROCHLORIDE 4 MG: 2 INJECTION, SOLUTION INTRAMUSCULAR; INTRAVENOUS at 10:02

## 2019-02-27 RX ADMIN — FENTANYL CITRATE 50 MCG: 50 INJECTION, SOLUTION INTRAMUSCULAR; INTRAVENOUS at 11:11

## 2019-02-27 ASSESSMENT — PULMONARY FUNCTION TESTS
PIF_VALUE: 1
PIF_VALUE: 20
PIF_VALUE: 1
PIF_VALUE: 20
PIF_VALUE: 1
PIF_VALUE: 24
PIF_VALUE: 20
PIF_VALUE: 2
PIF_VALUE: 21
PIF_VALUE: 16
PIF_VALUE: 18
PIF_VALUE: 1
PIF_VALUE: 2
PIF_VALUE: 1
PIF_VALUE: 17
PIF_VALUE: 2
PIF_VALUE: 2
PIF_VALUE: 17
PIF_VALUE: 2
PIF_VALUE: 17
PIF_VALUE: 17
PIF_VALUE: 16
PIF_VALUE: 17
PIF_VALUE: 2
PIF_VALUE: 1
PIF_VALUE: 17
PIF_VALUE: 2
PIF_VALUE: 17
PIF_VALUE: 17
PIF_VALUE: 2
PIF_VALUE: 2
PIF_VALUE: 17
PIF_VALUE: 2
PIF_VALUE: 18
PIF_VALUE: 15
PIF_VALUE: 1
PIF_VALUE: 17
PIF_VALUE: 28
PIF_VALUE: 2
PIF_VALUE: 2
PIF_VALUE: 16
PIF_VALUE: 20
PIF_VALUE: 17
PIF_VALUE: 7
PIF_VALUE: 15
PIF_VALUE: 17
PIF_VALUE: 11
PIF_VALUE: 21
PIF_VALUE: 17
PIF_VALUE: 20
PIF_VALUE: 17
PIF_VALUE: 18
PIF_VALUE: 17
PIF_VALUE: 20
PIF_VALUE: 17
PIF_VALUE: 2
PIF_VALUE: 2
PIF_VALUE: 1
PIF_VALUE: 18
PIF_VALUE: 17
PIF_VALUE: 17
PIF_VALUE: 18
PIF_VALUE: 4
PIF_VALUE: 2
PIF_VALUE: 1
PIF_VALUE: 17
PIF_VALUE: 2
PIF_VALUE: 17
PIF_VALUE: 14
PIF_VALUE: 2
PIF_VALUE: 17
PIF_VALUE: 17
PIF_VALUE: 14
PIF_VALUE: 1
PIF_VALUE: 17
PIF_VALUE: 2
PIF_VALUE: 1
PIF_VALUE: 17
PIF_VALUE: 17
PIF_VALUE: 16
PIF_VALUE: 1
PIF_VALUE: 10
PIF_VALUE: 17
PIF_VALUE: 17
PIF_VALUE: 20
PIF_VALUE: 2
PIF_VALUE: 17
PIF_VALUE: 2
PIF_VALUE: 14
PIF_VALUE: 17
PIF_VALUE: 2
PIF_VALUE: 1
PIF_VALUE: 20
PIF_VALUE: 17
PIF_VALUE: 2
PIF_VALUE: 2
PIF_VALUE: 20
PIF_VALUE: 17
PIF_VALUE: 2
PIF_VALUE: 20
PIF_VALUE: 17
PIF_VALUE: 24
PIF_VALUE: 18
PIF_VALUE: 20

## 2019-02-27 ASSESSMENT — PAIN SCALES - GENERAL
PAINLEVEL_OUTOF10: 0
PAINLEVEL_OUTOF10: 2
PAINLEVEL_OUTOF10: 0
PAINLEVEL_OUTOF10: 0

## 2019-02-27 ASSESSMENT — PAIN DESCRIPTION - PAIN TYPE: TYPE: SURGICAL PAIN

## 2019-02-27 ASSESSMENT — PAIN DESCRIPTION - DESCRIPTORS: DESCRIPTORS: DULL

## 2019-02-27 ASSESSMENT — PAIN DESCRIPTION - FREQUENCY: FREQUENCY: INTERMITTENT

## 2019-02-27 ASSESSMENT — PAIN - FUNCTIONAL ASSESSMENT: PAIN_FUNCTIONAL_ASSESSMENT: 0-10

## 2019-02-27 ASSESSMENT — PAIN DESCRIPTION - ORIENTATION: ORIENTATION: LEFT

## 2019-02-27 ASSESSMENT — PAIN DESCRIPTION - LOCATION: LOCATION: BREAST

## 2019-03-05 ENCOUNTER — TELEPHONE (OUTPATIENT)
Dept: SURGERY | Age: 74
End: 2019-03-05

## 2019-03-15 ENCOUNTER — OFFICE VISIT (OUTPATIENT)
Dept: BREAST CENTER | Age: 74
End: 2019-03-15
Payer: MEDICARE

## 2019-03-15 VITALS
WEIGHT: 150 LBS | RESPIRATION RATE: 12 BRPM | OXYGEN SATURATION: 99 % | TEMPERATURE: 98.9 F | HEIGHT: 58 IN | SYSTOLIC BLOOD PRESSURE: 128 MMHG | HEART RATE: 104 BPM | BODY MASS INDEX: 31.49 KG/M2 | DIASTOLIC BLOOD PRESSURE: 66 MMHG

## 2019-03-15 DIAGNOSIS — Z17.0 MALIGNANT NEOPLASM OF UPPER-OUTER QUADRANT OF LEFT BREAST IN FEMALE, ESTROGEN RECEPTOR POSITIVE (HCC): ICD-10-CM

## 2019-03-15 DIAGNOSIS — C50.412 MALIGNANT NEOPLASM OF UPPER-OUTER QUADRANT OF LEFT BREAST IN FEMALE, ESTROGEN RECEPTOR POSITIVE (HCC): ICD-10-CM

## 2019-03-15 DIAGNOSIS — Z09 POSTOP CHECK: Primary | ICD-10-CM

## 2019-03-15 PROCEDURE — 99024 POSTOP FOLLOW-UP VISIT: CPT | Performed by: SURGERY

## 2019-03-18 ENCOUNTER — TELEPHONE (OUTPATIENT)
Dept: SURGERY | Age: 74
End: 2019-03-18

## 2019-03-18 ENCOUNTER — TELEPHONE (OUTPATIENT)
Dept: BREAST CENTER | Age: 74
End: 2019-03-18

## 2019-03-21 ENCOUNTER — PREP FOR PROCEDURE (OUTPATIENT)
Dept: SURGERY | Age: 74
End: 2019-03-21

## 2019-03-21 RX ORDER — SODIUM CHLORIDE 9 MG/ML
INJECTION, SOLUTION INTRAVENOUS CONTINUOUS
Status: CANCELLED | OUTPATIENT
Start: 2019-03-21

## 2019-03-21 RX ORDER — SODIUM CHLORIDE 0.9 % (FLUSH) 0.9 %
10 SYRINGE (ML) INJECTION PRN
Status: CANCELLED | OUTPATIENT
Start: 2019-03-21

## 2019-03-21 RX ORDER — SODIUM CHLORIDE 0.9 % (FLUSH) 0.9 %
10 SYRINGE (ML) INJECTION EVERY 12 HOURS SCHEDULED
Status: CANCELLED | OUTPATIENT
Start: 2019-03-21

## 2019-03-26 ENCOUNTER — ANESTHESIA (OUTPATIENT)
Dept: OPERATING ROOM | Age: 74
End: 2019-03-26
Payer: MEDICARE

## 2019-03-26 ENCOUNTER — ANESTHESIA EVENT (OUTPATIENT)
Dept: OPERATING ROOM | Age: 74
End: 2019-03-26
Payer: MEDICARE

## 2019-03-26 ENCOUNTER — HOSPITAL ENCOUNTER (OUTPATIENT)
Age: 74
Setting detail: OUTPATIENT SURGERY
Discharge: HOME | End: 2019-03-26
Attending: SURGERY | Admitting: SURGERY
Payer: MEDICARE

## 2019-03-26 VITALS
DIASTOLIC BLOOD PRESSURE: 75 MMHG | BODY MASS INDEX: 31.49 KG/M2 | OXYGEN SATURATION: 97 % | WEIGHT: 150 LBS | RESPIRATION RATE: 16 BRPM | SYSTOLIC BLOOD PRESSURE: 136 MMHG | HEIGHT: 58 IN | HEART RATE: 68 BPM | TEMPERATURE: 97.6 F

## 2019-03-26 VITALS
RESPIRATION RATE: 8 BRPM | OXYGEN SATURATION: 100 % | TEMPERATURE: 95.7 F | SYSTOLIC BLOOD PRESSURE: 183 MMHG | DIASTOLIC BLOOD PRESSURE: 101 MMHG

## 2019-03-26 DIAGNOSIS — Z01.812 PRE-OPERATIVE LABORATORY EXAMINATION: Primary | ICD-10-CM

## 2019-03-26 DIAGNOSIS — D05.12 DUCTAL CARCINOMA IN SITU (DCIS) OF LEFT BREAST: ICD-10-CM

## 2019-03-26 LAB
ANION GAP SERPL CALCULATED.3IONS-SCNC: 12 MMOL/L (ref 7–16)
B.E.: 1.3 MMOL/L (ref -3–0)
BUN BLDV-MCNC: 15 MG/DL (ref 8–23)
CALCIUM SERPL-MCNC: 9.5 MG/DL (ref 8.6–10.2)
CARDIOPULMONARY BYPASS: NO
CHLORIDE BLD-SCNC: 100 MMOL/L (ref 98–107)
CO2: 27 MMOL/L (ref 22–29)
CREAT SERPL-MCNC: 0.7 MG/DL (ref 0.5–1)
DEVICE: ABNORMAL
GFR AFRICAN AMERICAN: >60
GFR NON-AFRICAN AMERICAN: >60 ML/MIN/1.73
GLUCOSE BLD-MCNC: 112 MG/DL (ref 74–99)
HCO3 ARTERIAL: 25.5 MMOL/L (ref 22–26)
HCT VFR BLD CALC: 36.5 % (ref 34–48)
HCT,ARTERIAL: 34 % (ref 34–48)
HEMOGLOBIN: 11.5 G/DL (ref 11.5–15.5)
HGB, ARTERIAL: 11.7 G/DL (ref 11.5–15.5)
MCH RBC QN AUTO: 26.4 PG (ref 26–35)
MCHC RBC AUTO-ENTMCNC: 31.5 % (ref 32–34.5)
MCV RBC AUTO: 83.7 FL (ref 80–99.9)
O2 SATURATION: 61.5 % (ref 92–98.5)
OPERATOR ID: ABNORMAL
PCO2 ARTERIAL: 37.9 MMHG (ref 35–45)
PDW BLD-RTO: 15 FL (ref 11.5–15)
PH BLOOD GAS: 7.44 (ref 7.35–7.45)
PLATELET # BLD: 262 E9/L (ref 130–450)
PMV BLD AUTO: 11.9 FL (ref 7–12)
PO2 ARTERIAL: 30.8 MMHG (ref 60–80)
POTASSIUM SERPL-SCNC: 3 MMOL/L (ref 3.5–5)
RBC # BLD: 4.36 E12/L (ref 3.5–5.5)
SODIUM BLD-SCNC: 139 MMOL/L (ref 132–146)
SOURCE, BLOOD GAS: ABNORMAL
WBC # BLD: 6.4 E9/L (ref 4.5–11.5)

## 2019-03-26 PROCEDURE — 36415 COLL VENOUS BLD VENIPUNCTURE: CPT

## 2019-03-26 PROCEDURE — 2580000003 HC RX 258: Performed by: SURGERY

## 2019-03-26 PROCEDURE — 7100000011 HC PHASE II RECOVERY - ADDTL 15 MIN: Performed by: SURGERY

## 2019-03-26 PROCEDURE — 2720000010 HC SURG SUPPLY STERILE: Performed by: SURGERY

## 2019-03-26 PROCEDURE — 6360000002 HC RX W HCPCS: Performed by: SURGERY

## 2019-03-26 PROCEDURE — 2500000003 HC RX 250 WO HCPCS

## 2019-03-26 PROCEDURE — 3700000001 HC ADD 15 MINUTES (ANESTHESIA): Performed by: SURGERY

## 2019-03-26 PROCEDURE — 7100000010 HC PHASE II RECOVERY - FIRST 15 MIN: Performed by: SURGERY

## 2019-03-26 PROCEDURE — 7100000001 HC PACU RECOVERY - ADDTL 15 MIN: Performed by: SURGERY

## 2019-03-26 PROCEDURE — 19340 INSJ BREAST IMPLT SM D MAST: CPT | Performed by: SURGERY

## 2019-03-26 PROCEDURE — 80048 BASIC METABOLIC PNL TOTAL CA: CPT

## 2019-03-26 PROCEDURE — 85027 COMPLETE CBC AUTOMATED: CPT

## 2019-03-26 PROCEDURE — 88307 TISSUE EXAM BY PATHOLOGIST: CPT

## 2019-03-26 PROCEDURE — 82803 BLOOD GASES ANY COMBINATION: CPT

## 2019-03-26 PROCEDURE — 7100000000 HC PACU RECOVERY - FIRST 15 MIN: Performed by: SURGERY

## 2019-03-26 PROCEDURE — 2709999900 HC NON-CHARGEABLE SUPPLY: Performed by: SURGERY

## 2019-03-26 PROCEDURE — A4648 IMPLANTABLE TISSUE MARKER: HCPCS | Performed by: SURGERY

## 2019-03-26 PROCEDURE — 3600000003 HC SURGERY LEVEL 3 BASE: Performed by: SURGERY

## 2019-03-26 PROCEDURE — 3600000013 HC SURGERY LEVEL 3 ADDTL 15MIN: Performed by: SURGERY

## 2019-03-26 PROCEDURE — 6360000002 HC RX W HCPCS

## 2019-03-26 PROCEDURE — 19301 PARTIAL MASTECTOMY: CPT | Performed by: SURGERY

## 2019-03-26 PROCEDURE — 2500000003 HC RX 250 WO HCPCS: Performed by: SURGERY

## 2019-03-26 PROCEDURE — 3700000000 HC ANESTHESIA ATTENDED CARE: Performed by: SURGERY

## 2019-03-26 DEVICE — MARKER TISS W3XL4CM RADIOGRAPHIC BIOABSRB SPCR HLD RADPQ: Type: IMPLANTABLE DEVICE | Site: BREAST | Status: FUNCTIONAL

## 2019-03-26 RX ORDER — GLYCOPYRROLATE 1 MG/5 ML
SYRINGE (ML) INTRAVENOUS PRN
Status: DISCONTINUED | OUTPATIENT
Start: 2019-03-26 | End: 2019-03-26 | Stop reason: SDUPTHER

## 2019-03-26 RX ORDER — ROCURONIUM BROMIDE 10 MG/ML
INJECTION, SOLUTION INTRAVENOUS PRN
Status: DISCONTINUED | OUTPATIENT
Start: 2019-03-26 | End: 2019-03-26 | Stop reason: SDUPTHER

## 2019-03-26 RX ORDER — CEFAZOLIN SODIUM 2 G/50ML
2 SOLUTION INTRAVENOUS
Status: COMPLETED | OUTPATIENT
Start: 2019-03-26 | End: 2019-03-26

## 2019-03-26 RX ORDER — DEXAMETHASONE SODIUM PHOSPHATE 10 MG/ML
INJECTION INTRAMUSCULAR; INTRAVENOUS PRN
Status: DISCONTINUED | OUTPATIENT
Start: 2019-03-26 | End: 2019-03-26 | Stop reason: SDUPTHER

## 2019-03-26 RX ORDER — MEPERIDINE HYDROCHLORIDE 50 MG/ML
12.5 INJECTION INTRAMUSCULAR; INTRAVENOUS; SUBCUTANEOUS EVERY 5 MIN PRN
Status: DISCONTINUED | OUTPATIENT
Start: 2019-03-26 | End: 2019-03-26 | Stop reason: HOSPADM

## 2019-03-26 RX ORDER — SODIUM CHLORIDE 0.9 % (FLUSH) 0.9 %
10 SYRINGE (ML) INJECTION EVERY 12 HOURS SCHEDULED
Status: DISCONTINUED | OUTPATIENT
Start: 2019-03-26 | End: 2019-03-26 | Stop reason: HOSPADM

## 2019-03-26 RX ORDER — PROPOFOL 10 MG/ML
INJECTION, EMULSION INTRAVENOUS PRN
Status: DISCONTINUED | OUTPATIENT
Start: 2019-03-26 | End: 2019-03-26 | Stop reason: SDUPTHER

## 2019-03-26 RX ORDER — SODIUM CHLORIDE 0.9 % (FLUSH) 0.9 %
10 SYRINGE (ML) INJECTION PRN
Status: DISCONTINUED | OUTPATIENT
Start: 2019-03-26 | End: 2019-03-26 | Stop reason: HOSPADM

## 2019-03-26 RX ORDER — ONDANSETRON 2 MG/ML
4 INJECTION INTRAMUSCULAR; INTRAVENOUS
Status: DISCONTINUED | OUTPATIENT
Start: 2019-03-26 | End: 2019-03-26 | Stop reason: HOSPADM

## 2019-03-26 RX ORDER — MIDAZOLAM HYDROCHLORIDE 1 MG/ML
INJECTION INTRAMUSCULAR; INTRAVENOUS PRN
Status: DISCONTINUED | OUTPATIENT
Start: 2019-03-26 | End: 2019-03-26 | Stop reason: SDUPTHER

## 2019-03-26 RX ORDER — FENTANYL CITRATE 50 UG/ML
25 INJECTION, SOLUTION INTRAMUSCULAR; INTRAVENOUS EVERY 5 MIN PRN
Status: DISCONTINUED | OUTPATIENT
Start: 2019-03-26 | End: 2019-03-26 | Stop reason: HOSPADM

## 2019-03-26 RX ORDER — SODIUM CHLORIDE 9 MG/ML
INJECTION, SOLUTION INTRAVENOUS CONTINUOUS
Status: DISCONTINUED | OUTPATIENT
Start: 2019-03-26 | End: 2019-03-26 | Stop reason: HOSPADM

## 2019-03-26 RX ORDER — ONDANSETRON 2 MG/ML
INJECTION INTRAMUSCULAR; INTRAVENOUS PRN
Status: DISCONTINUED | OUTPATIENT
Start: 2019-03-26 | End: 2019-03-26 | Stop reason: SDUPTHER

## 2019-03-26 RX ORDER — BUPIVACAINE HYDROCHLORIDE AND EPINEPHRINE 5; 5 MG/ML; UG/ML
INJECTION, SOLUTION EPIDURAL; INTRACAUDAL; PERINEURAL PRN
Status: DISCONTINUED | OUTPATIENT
Start: 2019-03-26 | End: 2019-03-26 | Stop reason: ALTCHOICE

## 2019-03-26 RX ORDER — NEOSTIGMINE METHYLSULFATE 1 MG/ML
INJECTION, SOLUTION INTRAVENOUS PRN
Status: DISCONTINUED | OUTPATIENT
Start: 2019-03-26 | End: 2019-03-26 | Stop reason: SDUPTHER

## 2019-03-26 RX ORDER — FENTANYL CITRATE 50 UG/ML
INJECTION, SOLUTION INTRAMUSCULAR; INTRAVENOUS PRN
Status: DISCONTINUED | OUTPATIENT
Start: 2019-03-26 | End: 2019-03-26 | Stop reason: SDUPTHER

## 2019-03-26 RX ORDER — FENTANYL CITRATE 50 UG/ML
50 INJECTION, SOLUTION INTRAMUSCULAR; INTRAVENOUS EVERY 5 MIN PRN
Status: DISCONTINUED | OUTPATIENT
Start: 2019-03-26 | End: 2019-03-26 | Stop reason: HOSPADM

## 2019-03-26 RX ADMIN — MIDAZOLAM HYDROCHLORIDE 2 MG: 1 INJECTION, SOLUTION INTRAMUSCULAR; INTRAVENOUS at 10:50

## 2019-03-26 RX ADMIN — SODIUM CHLORIDE: 9 INJECTION, SOLUTION INTRAVENOUS at 09:56

## 2019-03-26 RX ADMIN — FENTANYL CITRATE 50 MCG: 50 INJECTION, SOLUTION INTRAMUSCULAR; INTRAVENOUS at 11:20

## 2019-03-26 RX ADMIN — CEFAZOLIN SODIUM 2 G: 2 SOLUTION INTRAVENOUS at 11:07

## 2019-03-26 RX ADMIN — Medication 3 MG: at 12:51

## 2019-03-26 RX ADMIN — SODIUM CHLORIDE: 9 INJECTION, SOLUTION INTRAVENOUS at 10:47

## 2019-03-26 RX ADMIN — Medication 0.6 MG: at 12:51

## 2019-03-26 RX ADMIN — LIDOCAINE HYDROCHLORIDE 80 MG: 20 INJECTION, SOLUTION INTRAVENOUS at 10:52

## 2019-03-26 RX ADMIN — PROPOFOL 150 MG: 10 INJECTION, EMULSION INTRAVENOUS at 10:52

## 2019-03-26 RX ADMIN — FENTANYL CITRATE 100 MCG: 50 INJECTION, SOLUTION INTRAMUSCULAR; INTRAVENOUS at 10:52

## 2019-03-26 RX ADMIN — DEXAMETHASONE SODIUM PHOSPHATE 10 MG: 10 INJECTION INTRAMUSCULAR; INTRAVENOUS at 10:52

## 2019-03-26 RX ADMIN — ONDANSETRON HYDROCHLORIDE 4 MG: 2 INJECTION, SOLUTION INTRAMUSCULAR; INTRAVENOUS at 12:29

## 2019-03-26 RX ADMIN — ROCURONIUM BROMIDE 50 MG: 10 INJECTION, SOLUTION INTRAVENOUS at 10:52

## 2019-03-26 RX ADMIN — FENTANYL CITRATE 50 MCG: 50 INJECTION, SOLUTION INTRAMUSCULAR; INTRAVENOUS at 11:49

## 2019-03-26 ASSESSMENT — PULMONARY FUNCTION TESTS
PIF_VALUE: 33
PIF_VALUE: 33
PIF_VALUE: 1
PIF_VALUE: 31
PIF_VALUE: 33
PIF_VALUE: 29
PIF_VALUE: 20
PIF_VALUE: 15
PIF_VALUE: 23
PIF_VALUE: 13
PIF_VALUE: 30
PIF_VALUE: 6
PIF_VALUE: 30
PIF_VALUE: 32
PIF_VALUE: 33
PIF_VALUE: 30
PIF_VALUE: 32
PIF_VALUE: 31
PIF_VALUE: 33
PIF_VALUE: 1
PIF_VALUE: 32
PIF_VALUE: 38
PIF_VALUE: 26
PIF_VALUE: 30
PIF_VALUE: 1
PIF_VALUE: 33
PIF_VALUE: 36
PIF_VALUE: 37
PIF_VALUE: 31
PIF_VALUE: 27
PIF_VALUE: 31
PIF_VALUE: 22
PIF_VALUE: 1
PIF_VALUE: 33
PIF_VALUE: 37
PIF_VALUE: 32
PIF_VALUE: 30
PIF_VALUE: 32
PIF_VALUE: 32
PIF_VALUE: 1
PIF_VALUE: 32
PIF_VALUE: 2
PIF_VALUE: 33
PIF_VALUE: 30
PIF_VALUE: 28
PIF_VALUE: 31
PIF_VALUE: 30
PIF_VALUE: 29
PIF_VALUE: 51
PIF_VALUE: 31
PIF_VALUE: 31
PIF_VALUE: 33
PIF_VALUE: 32
PIF_VALUE: 13
PIF_VALUE: 28
PIF_VALUE: 32
PIF_VALUE: 31
PIF_VALUE: 33
PIF_VALUE: 31
PIF_VALUE: 32
PIF_VALUE: 30
PIF_VALUE: 30
PIF_VALUE: 22
PIF_VALUE: 37
PIF_VALUE: 30
PIF_VALUE: 1
PIF_VALUE: 32
PIF_VALUE: 34
PIF_VALUE: 29
PIF_VALUE: 1
PIF_VALUE: 31
PIF_VALUE: 36
PIF_VALUE: 29
PIF_VALUE: 32
PIF_VALUE: 32
PIF_VALUE: 30
PIF_VALUE: 32
PIF_VALUE: 38
PIF_VALUE: 32
PIF_VALUE: 30
PIF_VALUE: 13
PIF_VALUE: 22
PIF_VALUE: 2
PIF_VALUE: 30
PIF_VALUE: 32
PIF_VALUE: 32
PIF_VALUE: 33
PIF_VALUE: 32
PIF_VALUE: 1
PIF_VALUE: 29
PIF_VALUE: 31
PIF_VALUE: 25
PIF_VALUE: 29
PIF_VALUE: 2
PIF_VALUE: 32
PIF_VALUE: 1
PIF_VALUE: 32
PIF_VALUE: 35
PIF_VALUE: 32
PIF_VALUE: 32
PIF_VALUE: 30
PIF_VALUE: 1
PIF_VALUE: 30
PIF_VALUE: 32
PIF_VALUE: 30
PIF_VALUE: 32
PIF_VALUE: 30
PIF_VALUE: 32
PIF_VALUE: 31
PIF_VALUE: 30
PIF_VALUE: 38
PIF_VALUE: 28
PIF_VALUE: 23
PIF_VALUE: 31
PIF_VALUE: 28
PIF_VALUE: 31
PIF_VALUE: 29
PIF_VALUE: 32
PIF_VALUE: 33
PIF_VALUE: 32
PIF_VALUE: 31
PIF_VALUE: 24
PIF_VALUE: 32
PIF_VALUE: 31

## 2019-03-26 ASSESSMENT — PAIN DESCRIPTION - DESCRIPTORS
DESCRIPTORS: ACHING;DISCOMFORT
DESCRIPTORS: ACHING;DISCOMFORT
DESCRIPTORS: DISCOMFORT;DULL
DESCRIPTORS: DISCOMFORT;DULL
DESCRIPTORS: ACHING;DULL

## 2019-03-26 ASSESSMENT — PAIN DESCRIPTION - PAIN TYPE
TYPE: SURGICAL PAIN

## 2019-03-26 ASSESSMENT — PAIN SCALES - GENERAL
PAINLEVEL_OUTOF10: 2
PAINLEVEL_OUTOF10: 0

## 2019-03-26 ASSESSMENT — PAIN DESCRIPTION - LOCATION
LOCATION: BREAST

## 2019-03-26 ASSESSMENT — PAIN DESCRIPTION - ORIENTATION
ORIENTATION: LEFT

## 2019-03-26 ASSESSMENT — PAIN - FUNCTIONAL ASSESSMENT: PAIN_FUNCTIONAL_ASSESSMENT: 0-10

## 2019-03-31 ENCOUNTER — TELEPHONE (OUTPATIENT)
Dept: SURGERY | Age: 74
End: 2019-03-31

## 2019-04-05 ENCOUNTER — OFFICE VISIT (OUTPATIENT)
Dept: FAMILY MEDICINE CLINIC | Age: 74
End: 2019-04-05
Payer: MEDICARE

## 2019-04-05 ENCOUNTER — HOSPITAL ENCOUNTER (OUTPATIENT)
Age: 74
Discharge: HOME | End: 2019-04-07
Payer: MEDICARE

## 2019-04-05 VITALS
HEART RATE: 82 BPM | DIASTOLIC BLOOD PRESSURE: 89 MMHG | HEIGHT: 58 IN | OXYGEN SATURATION: 99 % | RESPIRATION RATE: 14 BRPM | WEIGHT: 153 LBS | SYSTOLIC BLOOD PRESSURE: 153 MMHG | BODY MASS INDEX: 32.12 KG/M2

## 2019-04-05 DIAGNOSIS — I10 ESSENTIAL HYPERTENSION: ICD-10-CM

## 2019-04-05 DIAGNOSIS — E87.6 HYPOKALEMIA: ICD-10-CM

## 2019-04-05 DIAGNOSIS — R05.9 COUGH IN ADULT: Primary | ICD-10-CM

## 2019-04-05 LAB
ANION GAP SERPL CALCULATED.3IONS-SCNC: 11 MMOL/L (ref 7–16)
BUN BLDV-MCNC: 14 MG/DL (ref 8–23)
CALCIUM SERPL-MCNC: 9.7 MG/DL (ref 8.6–10.2)
CHLORIDE BLD-SCNC: 99 MMOL/L (ref 98–107)
CO2: 28 MMOL/L (ref 22–29)
CREAT SERPL-MCNC: 0.7 MG/DL (ref 0.5–1)
GFR AFRICAN AMERICAN: >60
GFR NON-AFRICAN AMERICAN: >60 ML/MIN/1.73
GLUCOSE BLD-MCNC: 94 MG/DL (ref 74–99)
POTASSIUM SERPL-SCNC: 3.5 MMOL/L (ref 3.5–5)
SODIUM BLD-SCNC: 138 MMOL/L (ref 132–146)

## 2019-04-05 PROCEDURE — 80048 BASIC METABOLIC PNL TOTAL CA: CPT

## 2019-04-05 PROCEDURE — 36415 COLL VENOUS BLD VENIPUNCTURE: CPT | Performed by: FAMILY MEDICINE

## 2019-04-05 PROCEDURE — 99213 OFFICE O/P EST LOW 20 MIN: CPT | Performed by: FAMILY MEDICINE

## 2019-04-05 RX ORDER — LOSARTAN POTASSIUM 25 MG/1
25 TABLET ORAL DAILY
Qty: 30 TABLET | Refills: 3 | Status: SHIPPED | OUTPATIENT
Start: 2019-04-05 | End: 2019-06-05 | Stop reason: SDUPTHER

## 2019-04-05 ASSESSMENT — PATIENT HEALTH QUESTIONNAIRE - PHQ9
1. LITTLE INTEREST OR PLEASURE IN DOING THINGS: 0
SUM OF ALL RESPONSES TO PHQ QUESTIONS 1-9: 0
SUM OF ALL RESPONSES TO PHQ QUESTIONS 1-9: 0
SUM OF ALL RESPONSES TO PHQ9 QUESTIONS 1 & 2: 0
2. FEELING DOWN, DEPRESSED OR HOPELESS: 0

## 2019-04-05 NOTE — PROGRESS NOTES
[facility]  Precepting Note    Subjective:  C/o cough and sore throat  Started after she was extubated after surgery  No fever,chills, sob      ROS otherwise negative     Past medical, surgical, family and social history were reviewed, non-contributory, and unchanged unless otherwise stated. Objective:    BP (!) 157/93   Pulse 89   Resp 14   Ht 4' 10\" (1.473 m)   Wt 153 lb (69.4 kg)   LMP  (LMP Unknown)   SpO2 99%   BMI 31.98 kg/m²     Exam is as noted by resident with the following changes, additions or corrections:    General:  NAD; alert & oriented x 3   Throat; mild erythema  Heart:  RRR, no murmurs, gallops, or rubs. Lungs:  CTA bilaterally, no wheeze, rales or rhonchi  Abd: bowel sounds present, nontender, nondistended, no masses    Assessment/Plan:  Cough; observe for now  Monitor potassium levels  F/u as instructed     Attending Physician Statement  I have reviewed the chart, including any radiology or labs. I have discussed the case, including pertinent history and exam findings with the resident. I agree with the assessment, plan and orders as documented by the resident. Please refer to the resident note for additional information.       Electronically signed by Josiane Barbosa MD on 4/5/2019 at 9:16 AM

## 2019-04-05 NOTE — PROGRESS NOTES
or 76s    Cancer Maternal Grandfather         unknown       Social History     Socioeconomic History    Marital status: Single     Spouse name: None    Number of children: None    Years of education: None    Highest education level: None   Occupational History    None   Social Needs    Financial resource strain: None    Food insecurity:     Worry: None     Inability: None    Transportation needs:     Medical: None     Non-medical: None   Tobacco Use    Smoking status: Never Smoker    Smokeless tobacco: Never Used   Substance and Sexual Activity    Alcohol use:  Yes     Alcohol/week: 0.6 oz     Types: 1 Glasses of wine per week     Comment: occasionally    Drug use: No    Sexual activity: Never   Lifestyle    Physical activity:     Days per week: None     Minutes per session: None    Stress: None   Relationships    Social connections:     Talks on phone: None     Gets together: None     Attends Druze service: None     Active member of club or organization: None     Attends meetings of clubs or organizations: None     Relationship status: None    Intimate partner violence:     Fear of current or ex partner: None     Emotionally abused: None     Physically abused: None     Forced sexual activity: None   Other Topics Concern    None   Social History Narrative    None       Allergies   Allergen Reactions    Poultry Meal Hives    Molds & Smuts     Hydrocodone-Acetaminophen Rash    Pcn [Penicillins] Rash       Current Outpatient Medications on File Prior to Visit   Medication Sig Dispense Refill    chlorthalidone (HYGROTON) 25 MG tablet TAKE 1 TABLET BY MOUTH DAILY 30 tablet 3    Multiple Vitamins-Minerals (EYE VITAMINS) CAPS Take by mouth 2 times daily      atorvastatin (LIPITOR) 40 MG tablet Take 1 tablet by mouth daily (Patient taking differently: Take 40 mg by mouth nightly ) 90 tablet 1    potassium chloride (KLOR-CON M) 20 MEQ extended release tablet Take 1 tablet by mouth 2 times daily (Patient taking differently: Take 40 mEq by mouth daily ) 60 tablet 5     No current facility-administered medications on file prior to visit. OBJECTIVE:     VS: BP (!) 153/89   Pulse 82   Resp 14   Ht 4' 10\" (1.473 m)   Wt 153 lb (69.4 kg)   LMP  (LMP Unknown)   SpO2 99%   BMI 31.98 kg/m²   Wt Readings from Last 3 Encounters:   04/05/19 153 lb (69.4 kg)   03/26/19 150 lb (68 kg)   03/15/19 150 lb (68 kg)     Temp Readings from Last 3 Encounters:   03/26/19 97.6 °F (36.4 °C)   03/26/19 95.7 °F (35.4 °C)   03/15/19 98.9 °F (37.2 °C) (Oral)     BP Readings from Last 3 Encounters:   04/05/19 (!) 153/89   03/26/19 136/75   03/26/19 (!) 183/101        General assesment: Alert, Awake, Oriented times 3, no distress  Skin: Warm and dry, no rashes noted  Head: Normocephalic. No masses, lesions or tenderness noted, no rhinorrhea  Eyes: Conjunctivae appear normal, no scleral icterus   Ears: External ears normal, tympanic membranes normal b/l  Chest - clear to auscultation, no wheezes, rales or rhonchi, symmetric air entry  Heart - normal rate, regular rhythm, normal S1, S2, no murmurs, rubs, clicks or gallops  Abdomen - soft, nontender, nondistended, no masses or organomegaly  Neurological - alert, oriented, normal speech, no focal findings or movement disorder noted      ASSESSMENT / PLAN :     1. Cough in adult  Most likely viral illness which is improving, no need for treatment. Monitor cough, if does not improve in 1 week call clinic. Discussed acid reflux vs post intubation cough, pt to hold off any trial of PPI until follow up with Dr Supriya Londono. 2. Essential hypertension  Elevated BP, 2x in clinic today, discussed adding a 2nd agent. Losartan 25 mg daily. Recheck BP at 4/17 appt with pcp. 3. Hypokalemia  Recheck BMP today. Call pt with results. Return if symptoms worsen or fail to improve.      All Questions Answered  Labs as ordered above (if any)  Refills as needed  Armaan shaw counseling on the following healthy behaviors: medication adherence    Educational materials printed for patient's review and were included in patient instructions on his/her After Visit Summary and given to patient at the end of visit. Counseled regarding above diagnosis, including possible risks and complications,  especially if left uncontrolled. Counseled regarding the possible side effects, risks, benefits and alternatives to treatment; patient and/or guardian verbalizes understanding, agrees, feels comfortable with and wishes to proceed with above treatment plan. Advised patient to call with any new medication issues, and read all Rx info from pharmacy to assure aware of all possible risks and side effects of medication before taking. Reviewed age and gender appropriate health screening exams and vaccinations. Advised patient regarding importance of keeping up with recommended health maintenance and to schedule as soon as possible if overdue, as this is important in assessing for undiagnosed pathology, especially cancer, as well as protecting against potentially harmful/life threatening disease. Discussed any signs and symptoms that would warrant immediate ED evaluation    Leigideon Gerardohortencia was instructed to call if any new symptoms develop prior to next visit.        Amauri Decker M.D  PGY-3  Family Medicine

## 2019-04-12 ENCOUNTER — OFFICE VISIT (OUTPATIENT)
Dept: BREAST CENTER | Age: 74
End: 2019-04-12
Payer: MEDICARE

## 2019-04-12 VITALS
HEART RATE: 87 BPM | BODY MASS INDEX: 32.12 KG/M2 | TEMPERATURE: 98.1 F | RESPIRATION RATE: 12 BRPM | WEIGHT: 153 LBS | SYSTOLIC BLOOD PRESSURE: 128 MMHG | OXYGEN SATURATION: 98 % | DIASTOLIC BLOOD PRESSURE: 72 MMHG | HEIGHT: 58 IN

## 2019-04-12 DIAGNOSIS — M85.9 DISORDER OF BONE DENSITY AND STRUCTURE, UNSPECIFIED: ICD-10-CM

## 2019-04-12 DIAGNOSIS — D05.12 DUCTAL CARCINOMA IN SITU (DCIS) OF LEFT BREAST: Primary | ICD-10-CM

## 2019-04-12 PROCEDURE — 99024 POSTOP FOLLOW-UP VISIT: CPT | Performed by: SURGERY

## 2019-04-12 NOTE — PATIENT INSTRUCTIONS
Oncologist and Radiation oncologist office will call to set up referral. Will schedule DEXA scan to be done prior to Oncology appt once scheduled. Any questions or concerns, please contact my medical assistant Ashley at [phone].

## 2019-04-12 NOTE — PROGRESS NOTES
[facility]  PROGRESS NOTE  ATTENDING NOTE    Chief Complaint   Patient presents with    Post-Op Check     P/O left breast lumpectomy re-excision, replacement of biozorb DOS 03/26/19. Patient states doing good no questions or concerns. S:  79y/o F presents for f/u after re-excision of positive margins and replacement of biozorb. She states she can feel a mass and I have advised this is the [address]. She is sore, but has not taken anything for pain. She denies any other issues. O:  /72 (Site: Right Upper Arm, Position: Sitting, Cuff Size: Medium Adult)   Pulse 87   Temp 98.1 °F (36.7 °C) (Oral)   Resp 12   Ht 4' 10\" (1.473 m)   Wt 153 lb (69.4 kg)   LMP  (LMP Unknown)   SpO2 98%   BMI 31.98 kg/m²   Physical Exam   Constitutional: She is oriented to person, place, and time. She appears well-developed and well-nourished. HENT:   Head: Normocephalic and atraumatic. Eyes: Pupils are equal, round, and reactive to light. EOM are normal.   Neck: Normal range of motion. No tracheal deviation present. Pulmonary/Chest: Left breast exhibits tenderness. There is breast swelling. Breasts are asymmetrical (L>R). Musculoskeletal: Normal range of motion. She exhibits no edema. Neurological: She is alert and oriented to person, place, and time. Skin: Skin is warm and dry. Psychiatric: She has a normal mood and affect.  Her behavior is normal. Judgment and thought content normal.     PATHOLOGY  Diagnosis:  A.  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  Left breast, additional lateral margin excision: High-grade ductal  carcinoma in situ  Surgical margins negative for malignancy  Distance from closest margin: 1 mm from superior margin    Comment:      Intradepartmental consultation is obtained.  Calponin and  p63 immunostains have been performed on 3 blocks in part A. Lana Bliss stains  demonstrate a myoepithelial layer which supports the interpretation of  DCIS.  Definite invasive carcinoma is not identified. CANCER CASE SUMMARY  Procedure-excision  Specimen laterality-left  Size (extent) of DCIS: 1.3 x 1.2 x 1.2 cm  Histologic type-ductal carcinoma in situ  Nuclear grade-grade 3 (high)  Necrosis-present, central  Margins (part A)-anterior and lateral margins involved by DCIS; DCIS is 1  mm from posterior margin  Margins (part B)- uninvolved by DCIS   Distance from closest margin: 1 mm from superior margin  Regional lymph nodes-no lymph nodes submitted or found  TNM classification (AJCC eighth edition)-pTis  NX MX  Ancillary studies-see prior report [facility]  for ER/ NM results    Diagnosis:     A. Left breast, new posterior margin: Benign adipose tissue and       skeletal muscle with focal fat necrosis.  Negative for residual       carcinoma in situ.  Final margin free of carcinoma.     B. Left breast, new superior margin: Benign breast tissue with       fibrosis fat necrosis and foreign body reaction consistent with       previous biopsy site.  Negative for residual carcinoma in situ.       Final margin free of carcinoma.     C. Left breast, lumpectomy: Benign breast tissue with focal fibrosis       and fat necrosis consistent with previous biopsy site.  Negative for       residual ductal carcinoma in situ.  Final inked margins free of       carcinoma. ASSESSMENT/PLAN:  Left breast DCIS  --referral to radiation oncology  --referral to oncology  --DEXA scan  --monthly SBEs  --scar massage advised  --continue to maintain a healthy weight with diet and exercise.     Gonzalo Vale MD, MSc, FACS  4/12/2019  8:45 AM

## 2019-04-17 ENCOUNTER — OFFICE VISIT (OUTPATIENT)
Dept: FAMILY MEDICINE CLINIC | Age: 74
End: 2019-04-17
Payer: MEDICARE

## 2019-04-17 VITALS
SYSTOLIC BLOOD PRESSURE: 157 MMHG | WEIGHT: 156 LBS | HEART RATE: 84 BPM | RESPIRATION RATE: 18 BRPM | BODY MASS INDEX: 32.75 KG/M2 | DIASTOLIC BLOOD PRESSURE: 73 MMHG | HEIGHT: 58 IN | OXYGEN SATURATION: 98 %

## 2019-04-17 DIAGNOSIS — K21.9 GASTROESOPHAGEAL REFLUX DISEASE, ESOPHAGITIS PRESENCE NOT SPECIFIED: ICD-10-CM

## 2019-04-17 DIAGNOSIS — I10 ESSENTIAL HYPERTENSION: Primary | ICD-10-CM

## 2019-04-17 PROCEDURE — 99213 OFFICE O/P EST LOW 20 MIN: CPT | Performed by: FAMILY MEDICINE

## 2019-04-17 RX ORDER — OMEPRAZOLE 20 MG/1
20 CAPSULE, DELAYED RELEASE ORAL
Qty: 90 CAPSULE | Refills: 0 | Status: SHIPPED | OUTPATIENT
Start: 2019-04-17 | End: 2019-07-12 | Stop reason: SDUPTHER

## 2019-04-17 ASSESSMENT — ENCOUNTER SYMPTOMS
CONSTIPATION: 0
CHEST TIGHTNESS: 0
NAUSEA: 0
COUGH: 1
VOMITING: 0
DIARRHEA: 0
ABDOMINAL PAIN: 0
SHORTNESS OF BREATH: 0

## 2019-04-17 NOTE — PROGRESS NOTES
Chief Complaint   Patient presents with    Hypertension    Gastroesophageal Reflux       HPI:  The patient is a 76 y.o. female who presented to the office today with complaints of a bad taste in her mouth and a check up on her blood pressure. Patient recently underwent two surgeries: a lumpectomy and repeat surgery to the field due to not clean margins. Patient states she tolerated the first surgery well but had difficulties after the second with sore throat from the intubation and then a viral upper respiratory illness. She still has an occasional cough from this. HTN  Patient states she is taking all of her medications as prescribed. She does not like taking the potassium pills. She breaks them in half. She has tried the packets of powder before and does not prefer one over the other. She measures her blood pressures at home and they are consistently in the 130s/70s.     GERD  - has bad taste in mouth, tastes like artifical sweetener  - food doesn't taste right  - worse with spicy foods and donuts   - patient has used antacid in the past, not continuously     Past Medical History:   Diagnosis Date    Cancer ([facility] [address] 2019    breast    Hyperlipidemia     Hypertension     Pneumonia 1953    Shingles          Current Outpatient Medications:     omeprazole (PRILOSEC) 20 MG delayed release capsule, Take 1 capsule by mouth every morning (before breakfast), Disp: 90 capsule, Rfl: 0    losartan (COZAAR) 25 MG tablet, Take 1 tablet by mouth daily, Disp: 30 tablet, Rfl: 3    chlorthalidone (HYGROTON) 25 MG tablet, TAKE 1 TABLET BY MOUTH DAILY, Disp: 30 tablet, Rfl: 3    Multiple Vitamins-Minerals (EYE VITAMINS) CAPS, Take by mouth 2 times daily, Disp: , Rfl:     atorvastatin (LIPITOR) 40 MG tablet, Take 1 tablet by mouth daily (Patient taking differently: Take 40 mg by mouth nightly ), Disp: 90 tablet, Rfl: 1    potassium chloride (KLOR-CON M) 20 MEQ extended release tablet, Take 1 tablet by mouth 2 times daily (Patient taking differently: Take 40 mEq by mouth daily ), Disp: 60 tablet, Rfl: 5    Allergies   Allergen Reactions    Poultry Meal Hives    Molds & Smuts     Hydrocodone-Acetaminophen Rash    Pcn [Penicillins] Rash       Family History   Problem Relation Age of Onset    Parkinsonism Mother     Heart Attack Father     Cancer Maternal Aunt         Breast--60 or 76s    Cancer Maternal Grandfather         unknown       Past Surgical History:   Procedure Laterality Date    BREAST BIOPSY Left 2/27/2019    LEFT BREAST NEEDLE LOCALIZED  LUMPECTOMY  WITH BIOZORB  PLACEMENT --TRIDENT performed by Keaton Hannah MD at [email or web address] LUMPECTOMY Left 3/26/2019    LEFT BREAST LUMPECTOMY RE-EXCISION ANTERIOR LATERAL SUPERIOR & POSTERIOR MARGINS , REPLACEMENT OF BIOZORB performed by Keaton Hannah MD at [place] [id number]       Social History     Tobacco Use    Smoking status: Never Smoker    Smokeless tobacco: Never Used   Substance Use Topics    Alcohol use: Yes     Alcohol/week: 0.6 oz     Types: 1 Glasses of wine per week     Comment: occasionally    Drug use: No       BP (!) 157/73   Pulse 84   Resp 18   Ht 4' 10\" (1.473 m)   Wt 156 lb (70.8 kg)   LMP  (LMP Unknown)   SpO2 98%   BMI 32.60 kg/m²     Review of Systems   Constitutional: Negative for chills and fever. Respiratory: Positive for cough. Negative for chest tightness and shortness of breath. Cardiovascular: Negative for chest pain and palpitations. Gastrointestinal: Negative for abdominal pain, constipation, diarrhea, nausea and vomiting. Genitourinary: Negative for dysuria, frequency and hematuria. Skin: Negative for rash. Neurological: Negative for dizziness, light-headedness and headaches. Physical Exam   Constitutional: She is oriented to person, place, and time. She appears well-developed and well-nourished. No distress.    HENT: Head: Normocephalic and atraumatic. Eyes: Conjunctivae are normal.   Neck: Neck supple. Cardiovascular: Normal rate, regular rhythm and normal heart sounds. Exam reveals no gallop and no friction rub. No murmur heard. Pulmonary/Chest: Effort normal and breath sounds normal. No respiratory distress. She has no wheezes. She has no rales. Abdominal: Soft. She exhibits no distension. Musculoskeletal: She exhibits no edema. Neurological: She is alert and oriented to person, place, and time. Skin: Skin is warm. She is not diaphoretic. Psychiatric: She has a normal mood and affect. Her behavior is normal.   Vitals reviewed. ASSESSMENT      Diagnosis Orders   1. Essential hypertension     2. Gastroesophageal reflux disease, esophagitis presence not specified  omeprazole (PRILOSEC) 20 MG delayed release capsule         PLAN:     Melanie Antonio was seen today for hypertension and gastroesophageal reflux. Diagnoses and all orders for this visit:    1. Essential hypertension        - Continue losartan 25 mg and chlorthalidone 25 mg daily         - Seems to be controlled at home        - Electrolytes stable on last BMP; continue potassium    2. Gastroesophageal reflux disease, esophagitis presence not specified  -    Discussed likelihood of GERD being the reason for the bad taste in her mouth, especially since it gets worse with acidic foods and processed foods.  - Discussed possibility of gastritis due to stress over the past 2 months with her breast cancer diagnosis and surgeries  - Discussed trial of 2 months of PPI and then wean off and see if symptoms return  - Advised patient to start with 20 mg of omeprazole, if after 2 weeks this does not improve her symptoms, she should call and we will switch to 40 mg daily.   - Discussed lifestyle modifications that can help her symptoms, information attached  - omeprazole (PRILOSEC) 20 MG delayed release capsule;  Take 1 capsule by mouth every morning (before

## 2019-04-17 NOTE — PATIENT INSTRUCTIONS
Patient Education        Gastroesophageal Reflux Disease (GERD): Care Instructions  Your Care Instructions    Gastroesophageal reflux disease (GERD) is the backward flow of stomach acid into the esophagus. The esophagus is the tube that leads from your throat to your stomach. A one-way valve prevents the stomach acid from moving up into this tube. When you have GERD, this valve does not close tightly enough. If you have mild GERD symptoms including heartburn, you may be able to control the problem with antacids or over-the-counter medicine. Changing your diet, losing weight, and making other lifestyle changes can also help reduce symptoms. Follow-up care is a key part of your treatment and safety. Be sure to make and go to all appointments, and call your doctor if you are having problems. It's also a good idea to know your test results and keep a list of the medicines you take. How can you care for yourself at home? · Take your medicines exactly as prescribed. Call your doctor if you think you are having a problem with your medicine. · Your doctor may recommend over-the-counter medicine. For mild or occasional indigestion, antacids, such as Tums, Gaviscon, Mylanta, or Maalox, may help. Your doctor also may recommend over-the-counter acid reducers, such as Pepcid AC, Tagamet HB, Zantac 75, or Prilosec. Read and follow all instructions on the label. If you use these medicines often, talk with your doctor. · Change your eating habits. ? It's best to eat several small meals instead of two or three large meals. ? After you eat, wait 2 to 3 hours before you lie down. ? Chocolate, mint, and alcohol can make GERD worse. ? Spicy foods, foods that have a lot of acid (like tomatoes and oranges), and coffee can make GERD symptoms worse in some people. If your symptoms are worse after you eat a certain food, you may want to stop eating that food to see if your symptoms get better.   · Do not smoke or chew tobacco. Smoking can make GERD worse. If you need help quitting, talk to your doctor about stop-smoking programs and medicines. These can increase your chances of quitting for good. · If you have GERD symptoms at night, raise the head of your bed 6 to 8 inches by putting the frame on blocks or placing a foam wedge under the head of your mattress. (Adding extra pillows does not work.)  · Do not wear tight clothing around your middle. · Lose weight if you need to. Losing just 5 to 10 pounds can help. When should you call for help? Call your doctor now or seek immediate medical care if:    · You have new or different belly pain.     · Your stools are black and tarlike or have streaks of blood.    Watch closely for changes in your health, and be sure to contact your doctor if:    · Your symptoms have not improved after 2 days.     · Food seems to catch in your throat or chest.   Where can you learn more? Go to https://[employer]. org and sign in to your [employer] account. Enter M110 in the [employer]fusion box to learn more about \"Gastroesophageal Reflux Disease (GERD): Care Instructions. \"     If you do not have an account, please click on the \"Sign Up Now\" link. Current as of: March 27, 2018  Content Version: 11.9  © 5121-9101 [employer]. Care instructions adapted under license by [facility] ([facility]). If you have questions about a medical condition or this instruction, always ask your healthcare professional. [place] [address] any warranty or liability for your use of this information.

## 2019-04-17 NOTE — PROGRESS NOTES
Follow up of HTN well controlled  GERD bad taste in mouth  Examination  Blood pressure (!) 157/73, pulse 84, resp. rate 18, height 4' 10\" (1.473 m), weight 156 lb (70.8 kg), SpO2 98 %, not currently breastfeeding. stable exam  See resident's note  A/P  Omeprazole daily  Attending Physician Statement  I have discussed the case, including pertinent history and exam findings with the resident. I agree with the documented assessment and plan.

## 2019-04-18 PROBLEM — K21.9 GASTROESOPHAGEAL REFLUX DISEASE: Status: ACTIVE | Noted: 2019-04-18

## 2019-04-22 ENCOUNTER — HOSPITAL ENCOUNTER (OUTPATIENT)
Dept: RADIATION ONCOLOGY | Age: 74
Discharge: HOME | End: 2019-04-22
Payer: MEDICARE

## 2019-04-22 VITALS
DIASTOLIC BLOOD PRESSURE: 50 MMHG | BODY MASS INDEX: 32.65 KG/M2 | HEART RATE: 102 BPM | OXYGEN SATURATION: 99 % | WEIGHT: 156.2 LBS | SYSTOLIC BLOOD PRESSURE: 138 MMHG

## 2019-04-22 DIAGNOSIS — C80.1 CANCER (HCC): Primary | ICD-10-CM

## 2019-04-22 PROCEDURE — 99205 OFFICE O/P NEW HI 60 MIN: CPT | Performed by: RADIOLOGY

## 2019-04-22 PROCEDURE — 99205 OFFICE O/P NEW HI 60 MIN: CPT

## 2019-04-22 SDOH — ECONOMIC STABILITY: HOUSING INSECURITY: PLEASE ASSESS YOUR PATIENT'S LEVEL OF DISTRESS CONCERNING HOUSING (SCALE FROM 1-10): 0 (NONE)

## 2019-04-22 NOTE — PROGRESS NOTES
Radiation Oncology      [facility]. Jerson Akins  [address]  [address] [place] [address]. Mehdi Schmitt   [phone]               Referring Physician: Dr. 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 She presents to discuss fractionated external beam radiation therapy in the adjuvant setting as a component of definitive intent therapy. Abilio Márquez states that she has been in her usual state of health, noting no new breast Sx who underwent Sc Mgm at [place] imaging prompted  used and then [address] guided CNB. This conformed ER/ND+ DCIS 12/18/19. She opted for a BCS and after this resection and a re-excision - DCIS WO invasive diease was confirmed however high grade. We reviewed the available medical records including the complete medical history of this pt today prior to consultation. Epic -CE and available scanned documents per the Epic Media tab were reviewed PRN. A complete ROS was also performed today and is noted below. At consultation today I personally discussed the pts workup to date; including but not limited to applicable imaging studies, Pathology reports, and interventions. The NCCN guidelines, as pertaining to the above diagnosis were also recapped for the pt today in brief. Today, Jere Garber notes Sx that include breast soreness.    KPS 90.     -----      Pathology reviewed:        [facility] PATH 2/27/19:      Diagnosis:  A.  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  Left breast, additional lateral margin excision: High-grade ductal  carcinoma in situ  Surgical margins negative for malignancy  Distance from closest Non-medical: None   Tobacco Use    Smoking status: Never Smoker    Smokeless tobacco: Never Used   Substance and Sexual Activity    Alcohol use: Yes     Alcohol/week: 0.6 oz     Types: 1 Glasses of wine per week     Comment: occasionally    Drug use: No    Sexual activity: Never   Lifestyle    Physical activity:     Days per week: None     Minutes per session: None    Stress: None   Relationships    Social connections:     Talks on phone: None     Gets together: None     Attends Anabaptist service: None     Active member of club or organization: None     Attends meetings of clubs or organizations: None     Relationship status: None    Intimate partner violence:     Fear of current or ex partner: None     Emotionally abused: None     Physically abused: None     Forced sexual activity: None   Other Topics Concern    None   Social History Narrative    Melanie Antonio lives in [place] alone - retired from [employer]. Review of Systems - History obtained from chart review and the patient  General ROS: negative  Psychological ROS: negative  Ophthalmic ROS: negative  ENT ROS: negative  Allergy and Immunology ROS: negative  Hematological and Lymphatic ROS: negative  Endocrine ROS: negative  Breast ROS: negative for NEW breast lumps  Respiratory ROS: no cough, shortness of breath, or wheezing  Cardiovascular ROS: no chest pain or dyspnea on exertion  Gastrointestinal ROS: no abdominal pain, change in bowel habits, or black or bloody stools  Genito-Urinary ROS: no dysuria, trouble voiding, or hematuria  Musculoskeletal ROS: negative  Neurological ROS: no TIA or stroke symptoms  Dermatological ROS: negative        Physical Exam   Constitutional: She is oriented to person, place, and time. She appears well-developed. HENT:   Head: Normocephalic. Eyes: Pupils are equal, round, and reactive to light. Neck: Normal range of motion. Neck supple. Cardiovascular: Normal rate, regular rhythm and normal heart sounds. Pulmonary/Chest: Effort normal and breath sounds normal.   Abdominal: Soft. She exhibits no distension and no mass. There is no tenderness. There is no rebound and no guarding. No hernia. Musculoskeletal: Normal range of motion. She exhibits no edema. Neurological: She is alert and oriented to person, place, and time. Skin: Skin is warm and dry. Psychiatric: She has a normal mood and affect. Her behavior is normal. Judgment and thought content normal.         Imaging reviewed:    [facility] 12/18/19:  MAMMOGRAM FINDINGS:   Finding 1:   There is a high density, irregular mass measuring 25 x 23 mm with indistinct margins and associated coarse heterogeneous and fine pleomorphic calcifications seen in the upper outer quadrant of the left breast.       IMPRESSION:   Mass in the left breast is suspicious. A stereotactic biopsy is recommended, and was subsequently performed.       =======================================   BI-RADS Category 4:  Suspicious Abnormality            Radiation Safety and Treatment Support:  -previous Radiation history: No  -history of connective tissue disease: No  -history of autoimmune disease: No  -pregnant: not applicable  -nutrition consult prior to [place]: Yes  -PEG: No  -Dental evaluation prior to treatment:No  -Social Work requested: Yes  -Oncology Nurse Navigator requested: Yes  -pre + post treatment PT / Rehab / PM+R evaluation considered: Yes  -ICD: No   -ICD brand: -  -[facility] patient navigator: Ezra Vazquez  -Nurse Practitioners for Radiation Oncology:    ---Melissa Kimble, MAMIE, RN, FNP-C   ---Sabas Coffman, MAMIE, RN, FNP-BC        Assessment and Plan: Javi Nix is a pleasant and cooperative 76year old with a recent diagnosis of 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 We recommend adjuvant external beam radiation therapy.  The 10 year in-breast recurrence rates for DCIS in general (post resection with negative margins) ranges from 25-30%, with up to 50% of these in breast recurrences occurring in the form of invasive disease. Based on large randomized trials, including but not limited to the NSABP B-17, B-24, [facility] [place] [address]; whole breast radiation reduces the risk of IBTR by 50%. With a combination of adjuvant whole breast radiation (and Tamoxifen or an aromatase inhibitor if applicable) IBTR rates drop to low rates in the range of 2-6% (B-24:Olivier B, Lancet. 1999 Jun 12;353(6972):4068-7473). Cause specific survival from this disease when breast conserving surgery is combined with adjuvant therapy may be as high as 98% (Yessi LJ, Cancer. 2005 Mar 15;103(6):1137-46). Whole breast radiation to 50 Gy in standard fractions may be combined with a boost based on specific diease and patient characteristics and was discussed in detail with the patient as applicable ([place] fractionation considered in all cases if appropriate, based on [address]). For left sided breast cancer and select right sided case, utilization of the Active Breathing Coordinator will be considered to reduce radiation dose to the heart (and lung in certain situations) [Irena at al. PRO. 2015; 5(1): 4-10]. The risks, benefits, alternatives, process and logistics of external beam radiation were reviewed. We answered all of the patient's questions to the best of our ability who verbalized understanding and seemed satisfied. Radiation planning will commence within 7-14 days; the next step in management being the simulation scan, with external beam radiation to commence in a timely fashion thereafter. It was a pleasure meeting Vertie Cooks today and we appreciate the referral and opportunity to be involved in her care. We had an extensive discussion today regarding the course to date (including a focused review of theapplicable radiographic and laboratory information), multidisciplinary approach to cancer care, and indications for external beam radiation therapy as a component therein.  A

## 2019-04-22 NOTE — PROGRESS NOTES
Alfonso Chappell  1945 76 y.o. Referring Physician: Dr Satnam Mcfadden    PCP: Lynn Simons MD     Vitals:    19 0908   BP: (!) 138/50   Pulse: 102   SpO2: 99%        Wt Readings from Last 3 Encounters:   19 156 lb 3.2 oz (70.9 kg)   19 156 lb (70.8 kg)   19 153 lb (69.4 kg)        Body mass index is 32.65 kg/m². Chief Complaint: No chief complaint on file. Cancer Staging  Ductal carcinoma in situ (DCIS) of left breast  Staging form: Breast, AJCC 8th Edition  - Clinical stage from 3/27/2019: Stage 0 (cTis (DCIS), cN0, cM0, G3, ER+, SD+, HER2: Not Assessed) - Signed by Keaton Hannah MD on 3/27/2019      Prior Radiation Therapy? NO    Concurrent Chemo/radiation? NO    Prior Chemotherapy? NO    Prior Hormonal Therapy? NO    Head and Neck Cancer? No, patient does NOT have HN cancer. LMP: in her early 63's    Age at first Menses: 15    : 0    Para: 0        Current Outpatient Medications   Medication Sig Dispense Refill    omeprazole (PRILOSEC) 20 MG delayed release capsule Take 1 capsule by mouth every morning (before breakfast) 90 capsule 0    losartan (COZAAR) 25 MG tablet Take 1 tablet by mouth daily 30 tablet 3    chlorthalidone (HYGROTON) 25 MG tablet TAKE 1 TABLET BY MOUTH DAILY 30 tablet 3    Multiple Vitamins-Minerals (EYE VITAMINS) CAPS Take by mouth 2 times daily      atorvastatin (LIPITOR) 40 MG tablet Take 1 tablet by mouth daily (Patient taking differently: Take 40 mg by mouth nightly ) 90 tablet 1    potassium chloride (KLOR-CON M) 20 MEQ extended release tablet Take 1 tablet by mouth 2 times daily (Patient taking differently: Take 40 mEq by mouth daily ) 60 tablet 5     No current facility-administered medications for this encounter.         Past Medical History:   Diagnosis Date    Cancer [facility])     breast    Hyperlipidemia     Hypertension     Pneumonia 1953    Shingles        Past Surgical History:   Procedure Laterality Date    BREAST BIOPSY Left 2/27/2019    LEFT BREAST NEEDLE LOCALIZED  LUMPECTOMY  WITH BIOZORB  PLACEMENT --TRIDENT performed by Kary Gonzalez MD at . [facility] LUMPECTOMY Left 3/26/2019    LEFT BREAST LUMPECTOMY RE-EXCISION ANTERIOR LATERAL SUPERIOR & POSTERIOR MARGINS , REPLACEMENT OF BIOZORB performed by Kary Gonzalez MD at [facility] 59       Family History   Problem Relation Age of Onset    Parkinsonism Mother     Heart Attack Father     Cancer Father 80        leukemia    Cancer Maternal Aunt         Breast--60 or 76s    Cancer Maternal Grandfather         unknown    Cancer Paternal Cousin 12        leukemia       Social History     Socioeconomic History    Marital status: Single     Spouse name: Not on file    Number of children: Not on file    Years of education: Not on file    Highest education level: Not on file   Occupational History    Not on file   Social Needs    Financial resource strain: Not on file    Food insecurity:     Worry: Not on file     Inability: Not on file    Transportation needs:     Medical: Not on file     Non-medical: Not on file   Tobacco Use    Smoking status: Never Smoker    Smokeless tobacco: Never Used   Substance and Sexual Activity    Alcohol use:  Yes     Alcohol/week: 0.6 oz     Types: 1 Glasses of wine per week     Comment: occasionally    Drug use: No    Sexual activity: Never   Lifestyle    Physical activity:     Days per week: Not on file     Minutes per session: Not on file    Stress: Not on file   Relationships    Social connections:     Talks on phone: Not on file     Gets together: Not on file     Attends Orthodox service: Not on file     Active member of club or organization: Not on file     Attends meetings of clubs or organizations: Not on file     Relationship status: Not on file    Intimate partner violence:     Fear of current or ex partner: Not on file     Emotionally abused: Not on file     Physically abused: Not on file     Forced sexual activity: Not on file   Other Topics Concern    Not on file   Social History Narrative    Not on file           Occupation: retired educator  Retired:  YES: Patient is retired from [address]: <<For Level 5, 10 or more systems>> Approximately 20 minutes was spent with patient, utilizing slides and handouts, related to receiving radiation therapy to the left breast related to High Grade DCIS,   pTis Nx Mx. Patient underwent a lumpectomy on 2/27/2019 with re-excision completed on 3/26/2019-both surgeries completed by Dr Geroldine Seip. Patient is scheduled for an initial consult with Medical Oncology on May 7, 2019 with Dr Deette Osler. All patient's questions were answered from a nursing perspective, with patient expressing understanding of the information presented today. Pacemaker/Defibulator/ICD:  No    Mediport: No        FALLS RISK SCREENING ASSESSMENT    Instructions:  Assess the patient and enter the appropriate indicators that are present for fall risk identification. Total the numbers entered and assign a fall risk score from Table 2.  Reassess patient at a minimum every 12 weeks or with status change. Assessment   Date  4/22/2019     1. Mental Ability: confusion/cognitively impaired No - 0       2. Elimination Issues: incontinence, frequency No - 0       3. Ambulatory: use of assistive devices (walker, cane, off-loading devices), attached to equipment (IV pole, oxygen) No - 0     4. Sensory Limitations: dizziness, vertigo, impaired vision No - 0       5. Age 72 years or greater - 1       10. Medication: diuretics, strong analgesics, hypnotics, sedatives, antihypertensive agents   Yes - 3   7. Falls:  recent history of falls within the last 3 months (not to include slipping or tripping)   No - 0   TOTAL 4    If score of 4 or greater was education given?  Yes       TABLE 2 Risk Score Risk Level Plan of Care   0-3 Little or  No Risk 1. Provide assistance as indicated for ambulation activities  2. Reorient confused/cognitively impaired patient  3. Call-light/bell within patient's reach  4. Chair/bed in low position, stretcher/bed with siderails up except when performing patient care activities  5. Educate patient/family/caregiver on falls prevention  6.  Reassess in 12 weeks or with any noted change in patient condition which places them at a risk for a fall   4-6 Moderate Risk 1. Provide assistance as indicated for ambulation activities  2. Reorient confused/cognitively impaired patient  3. Call-light/bell within patient's reach  4. Chair/bed in low position, stretcher/bed with siderails up except when performing patient care activities  5. Educate patient/family/caregiver on falls prevention  6. Falls risk precaution (Yellow sticker Level II) placed on patient chart   7 or   Higher High Risk 1. Place patient in easily observable treatment room  2. Patient attended at all times by family member or staff  3. Provide assistance as indicated for ambulation activities  4. Reorient confused/cognitively impaired patient  5. Call-light/bell within patient's reach  6. Chair/bed in low position, stretcher/bed with siderails up except when performing patient care activities  7. Educate patient/family/caregiver on falls prevention  8. Falls risk precaution (Yellow sticker Level III) placed on patient chart           MALNUTRITION RISK SCREENING ASSESSMENT    Instructions:  Assess the patient and enter the appropriate indicators that are present for nutrition risk identification. Total the numbers entered and assign a risk score. Follow the appropriate action for total score listed below. Assessment   Date  4/22/2019     1. Have you lost weight without trying? 1- Yes, 0.5 kg to 5 kg  r/t shingles diagnoses   2.  Have you been eating poorly because of a decreased appetite? 0- No   3. Do you have a diagnosis of head and neck cancer? 0- No                                                                                    TOTAL 1          Score of 0-1: No action  Score 2 or greater:  · For Non-Diabetic Patient: Recommend adding Ensure Complete 2 x daily and provide patient with Ensure wellness bag with coupons  · For Diabetic Patient: Recommend adding Glucerna Shake 2 x daily and provide patient with Glucerna Wellness bag with coupons  · Route to the dietitian via [address]    · Are you having  difficulty performing daily routine tasks  due to fatigue or weakness (ie: bathing/showering, dressing, housework, meal prep, work, child Sharmin Platts): No     · Do you have any arm flexibility/ROM restrictions, swelling or pain that limit activity: No     · Any changes in memory, attention/focus that impact daily activities: No     · Do you avoid participation in leisure/social activity due weakness, fatigue or pain: No     ARE ANY OF THE ABOVE ARE ANSWERED YES: No          PT ASSESSMENT FOR REFERRAL    · Have you had any recent falls in past 2 months: No     · Do you have difficulty  going up/down stairs: No     · Are you having difficulty walking: No     · Do you often hold onto furniture/environmental supports or feel off balance when you are walking: No     · Do you need to take rest breaks when you are walking: No     · Any pain on scale of 1-10 that limits your mobility: No 0/10    ARE ANY OF THE ABOVE ARE ANSWERED YES: No           LYMPHEDEMA SCREENING ASSESSMENT FOR PATIENTS WITH BREAST CANCER    The patient reports the following signs/symptoms of lymphedema: None    Please ask the provider to assess patient for lymphedema for any reported signs or symptoms so a referral to Lymphedema Therapy can be considered. PREHAB AUDIOLOGY REFERRAL    - Is patient planned to receive Cisplatin?  No. This patient is not planned to start

## 2019-05-01 ENCOUNTER — HOSPITAL ENCOUNTER (OUTPATIENT)
Dept: RADIATION ONCOLOGY | Age: 74
Discharge: HOME | End: 2019-05-01
Attending: RADIOLOGY
Payer: MEDICARE

## 2019-05-01 DIAGNOSIS — C80.1 CANCER (HCC): Primary | ICD-10-CM

## 2019-05-01 PROCEDURE — 77290 THER RAD SIMULAJ FIELD CPLX: CPT | Performed by: RADIOLOGY

## 2019-05-01 PROCEDURE — 77334 RADIATION TREATMENT AID(S): CPT | Performed by: RADIOLOGY

## 2019-05-01 PROCEDURE — 99999 PR OFFICE/OUTPT VISIT,PROCEDURE ONLY: CPT | Performed by: RADIOLOGY

## 2019-05-01 NOTE — PROGRESS NOTES
Radiation Oncology          -chart reviewed  -imaging reviewed  -cont RT as planned        Gerard Stauffer MD [place] [address] Oncology  Cell: [phone]    [facility]:  [phone]   FAX: [phone]  [facility]:  [address] [place] [address] Avenue:    [phone]  [facility]:  [address] [place] [address] Road:  [phone]

## 2019-05-03 PROCEDURE — 77293 RESPIRATOR MOTION MGMT SIMUL: CPT | Performed by: RADIOLOGY

## 2019-05-03 PROCEDURE — 77334 RADIATION TREATMENT AID(S): CPT | Performed by: RADIOLOGY

## 2019-05-03 PROCEDURE — 77300 RADIATION THERAPY DOSE PLAN: CPT | Performed by: RADIOLOGY

## 2019-05-03 PROCEDURE — 77295 3-D RADIOTHERAPY PLAN: CPT | Performed by: RADIOLOGY

## 2019-05-07 ENCOUNTER — HOSPITAL ENCOUNTER (OUTPATIENT)
Dept: GENERAL RADIOLOGY | Age: 74
Discharge: HOME | End: 2019-05-09
Payer: MEDICARE

## 2019-05-07 ENCOUNTER — OFFICE VISIT (OUTPATIENT)
Dept: ONCOLOGY | Age: 74
End: 2019-05-07
Payer: MEDICARE

## 2019-05-07 ENCOUNTER — HOSPITAL ENCOUNTER (OUTPATIENT)
Dept: INFUSION THERAPY | Age: 74
Discharge: HOME | End: 2019-05-07
Payer: MEDICARE

## 2019-05-07 VITALS
RESPIRATION RATE: 18 BRPM | HEIGHT: 58 IN | HEART RATE: 87 BPM | DIASTOLIC BLOOD PRESSURE: 82 MMHG | WEIGHT: 156.3 LBS | SYSTOLIC BLOOD PRESSURE: 161 MMHG | TEMPERATURE: 97.7 F | BODY MASS INDEX: 32.81 KG/M2

## 2019-05-07 DIAGNOSIS — M85.9 DISORDER OF BONE DENSITY AND STRUCTURE, UNSPECIFIED: ICD-10-CM

## 2019-05-07 DIAGNOSIS — D05.12 DUCTAL CARCINOMA IN SITU (DCIS) OF LEFT BREAST: ICD-10-CM

## 2019-05-07 PROCEDURE — 99214 OFFICE O/P EST MOD 30 MIN: CPT | Performed by: INTERNAL MEDICINE

## 2019-05-07 PROCEDURE — 99205 OFFICE O/P NEW HI 60 MIN: CPT | Performed by: INTERNAL MEDICINE

## 2019-05-07 PROCEDURE — 77080 DXA BONE DENSITY AXIAL: CPT

## 2019-05-07 SDOH — ECONOMIC STABILITY: HOUSING INSECURITY: PLEASE ASSESS YOUR PATIENT'S LEVEL OF DISTRESS CONCERNING HOUSING (SCALE FROM 1-10): 2

## 2019-05-07 NOTE — PROGRESS NOTES
RISK ASSESSMENT:   During this patient's visit, information obtained was used to generate a lifetime risk assessment using the Tyrer-Cuzick model (also called the BRENNON [International Breast Cancer Intervention Study] Breast Cancer Risk Evaluation Tool).     - LIFETIME RISK -   Patient has a Tyrer Cuzick score of 7.9%   - BREAST TISSUE DENSITY -   Heterogeneously dense       -AVERAGE RISK (<15% )   Yearly screening mammograms starting at age 36 and older. Regardless of breast density, tomosynthesis is suggested. Monthly self-breast exams are recommended for women age 27 and older.       NOTE:   Mammography is not 100% accurate in the detection of breast cancer.  Accuracy decreases as mammographic density of the breast increases.  A negative mammogram should not deter further evaluation (including biopsy) of suspicious physical findings.     Recommendations are based on NCCN ([address]) and ACR [employer] of Radiology) guidelines.       Thank you for sending your patient to this ACR and FDA approved facility. If there are any physician concerns regarding this report, a Radiologist can be reached by calling the following number [phone].       Follow up Protocol for the [place]:   BI-RADS 1 or 2: Lenny Hughes will send a reminder when next mammogram is due. BI-RADS 3: Lenny Hughes will send a reminder for recommended short term follow up.    BI-RADS 0, 4 or 5: Lenny Hughes will contact patient to schedule all additional views and procedures.      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  Detached fragments of carcinoma  without associated stroma are also present.  Microcalcifications are  noted.  Intradepartmental consultation is obtained. Breast Cancer Marker Studies:  Estrogen Receptors (ER):  Positive:         Percentage of cells positive:  >90%         Intensity:  Strong    Progesterone Receptors (LA):  Positive:         Percentage of cells positive:  90%         Intensity:  Moderate to strong     The patient underwent on 1/18/2019 a Left breast needle localized lumpectomy, pathology:  CANCER CASE SUMMARY  Procedure-excision  Specimen laterality-left  Size (extent) of DCIS: 1.3 x 1.2 x 1.2 cm  Histologic type-ductal carcinoma in situ  Nuclear grade-grade 3 (high)  Necrosis-present, central  Margins (part A)-anterior and lateral margins involved by DCIS; DCIS is 1  mm from posterior margin  Margins (part B)- uninvolved by DCIS   Distance from closest margin: 1 mm from superior margin  Regional lymph nodes-no lymph nodes submitted or found  TNM classification (AJCC eighth edition)-pTis  NX MX  Ancillary studies-see prior report [facility]  for ER/ LA results    She underwent on 3/26/2019 a left lumpectomy specimen, new posterior margin, additional superior margin, path was neg for residual DCIS. The patient met with Dr. Radha Wetzel, and will start adjuvant radiation. Review of Systems;  CONSTITUTIONAL: No fever, chills. Good appetite and energy level. ENMT: Eyes: No diplopia; Nose: No epistaxis. Mouth: No sore throat. RESPIRATORY: No hemoptysis, shortness of breath, cough. CARDIOVASCULAR: No chest pain, palpitations. GASTROINTESTINAL: No nausea/vomiting, abdominal pain, diarrhea/constipation. GENITOURINARY: No dysuria, urinary frequency, hematuria. NEURO: No syncope, presyncope, headache. MSK: pos for chronic joints pain.   Remainder:  ROS NEGATIVE    Past Medical History:      Diagnosis Date    Cancer ([facility] [address] 2019    breast    Hyperlipidemia     Hypertension     Pneumonia 1953    Shingles      Patient Active Problem List   Diagnosis    Herpes zoster without complication    Essential hypertension    Ductal carcinoma in situ (DCIS) of left breast    Gastroesophageal reflux disease        Past Surgical History:      Procedure Laterality Date    BREAST BIOPSY Left 2/27/2019    LEFT BREAST NEEDLE LOCALIZED  LUMPECTOMY  WITH BIOZORB  PLACEMENT --TRIDENT performed by Desiree Fisher MD at . [facility] [address] LUMPECTOMY Left 3/26/2019    LEFT BREAST LUMPECTOMY RE-EXCISION ANTERIOR LATERAL SUPERIOR & POSTERIOR MARGINS , REPLACEMENT OF BIOZORB performed by Desiree Fisher MD at [place] [address]       Family History:  Family History   Problem Relation Age of Onset   Alice Larch Parkinsonism Mother     Heart Attack Father     Cancer Father 80        leukemia    Cancer Maternal Aunt         Breast--60 or 76s    Cancer Maternal Grandfather         unknown    Cancer Paternal Cousin 16        leukemia       Medications:  Reviewed and reconciled. Social History:  Social History     Socioeconomic History    Marital status: Single     Spouse name: Not on file    Number of children: Not on file    Years of education: Not on file    Highest education level: Not on file   Occupational History    Not on file   Social Needs    Financial resource strain: Not on file    Food insecurity:     Worry: Not on file     Inability: Not on file    Transportation needs:     Medical: Not on file     Non-medical: Not on file   Tobacco Use    Smoking status: Never Smoker    Smokeless tobacco: Never Used   Substance and Sexual Activity    Alcohol use:  Yes     Alcohol/week: 0.6 oz     Types: 1 Glasses of wine per week     Comment: occasionally    Drug use: No    Sexual activity: Never   Lifestyle    Physical activity:     Days per week: Not on file     Minutes per session: Not on file    Stress: Not on file   Relationships    Social connections:     Talks on phone: Not on file     Gets together: Not on file     Attends Shinto service: Not on file     Active member of club or organization: Not on file     Attends meetings of clubs or organizations: Not on file     Relationship status: Not on file    Intimate partner violence:     Fear of current or ex partner: Not on file     Emotionally abused: Not on file     Physically abused: Not on file     Forced sexual activity: Not on file   Other Topics Concern    Not on file   Social History Narrative    Melanie Antonio lives in [place] alone - retired from [employer]. Allergies: Allergies   Allergen Reactions    Poultry Meal Hives    Molds & Smuts     Hydrocodone-Acetaminophen Rash    Pcn [Penicillins] Rash     OB/GYN:  Age of menarche was 8. Age of menopause was 61. Last menstrual period was age 61. Patient denies hormonal therapy. Patient is     Physical Exam:  BP (!) 161/82 (Site: Right Upper Arm, Position: Sitting, Cuff Size: Medium Adult)   Pulse 87   Temp 97.7 °F (36.5 °C) (Temporal)   Resp 18   Ht 4' 10\" (1.473 m)   Wt 156 lb 4.8 oz (70.9 kg)   LMP  (LMP Unknown)   BMI 32.67 kg/m²   GENERAL: Alert, oriented x 3, not in acute distress. HEENT: PERRLA; EOMI. Oropharynx clear. NECK: Supple. No palpable cervical or supraclavicular lymphadenopathy. LUNGS: Good air entry bilaterally. No wheezing, crackles or rhonchi. BREASTS: Right breast exam is negative for any skin changes, no nipple discharge, no palpable masses, no palpable axillary lymphadenopathy. The left breast exam is negative for any nipple discharge, she has a well healed incision, no palpable masses, no palpable left axillary lymphadenopathy. CARDIOVASCULAR: Regular rate. No murmurs, rubs or gallops. ABDOMEN: Soft. Non-tender, non-distended. Positive bowel sounds. EXTREMITIES: Without clubbing, cyanosis, or edema. NEUROLOGIC: No focal deficits.    ECOG PS 1    Impression/Plan:      The patient is a pleasant 42-year-old lady, with a past medical history significant for hyperlipidemia, and hypertension, who had presented with an abnormal screening

## 2019-05-07 NOTE — PROGRESS NOTES
Erin Paulino  1945 76 y.o. Referring Physician: Dr. Gloria Dietrich    PCP: Malini Melgoza MD    Vitals:    19 1127   BP: (!) 161/82   Pulse: 87   Resp: 18   Temp: 97.7 °F (36.5 °C)        Wt Readings from Last 3 Encounters:   19 156 lb 4.8 oz (70.9 kg)   19 156 lb 3.2 oz (70.9 kg)   19 156 lb (70.8 kg)        Body mass index is 32.67 kg/m². 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 Chief Complaint   Patient presents with    New Patient         Cancer Staging  Ductal carcinoma in situ (DCIS) of left breast  Staging form: Breast, AJCC 8th Edition  - Clinical stage from 3/27/2019: Stage 0 (cTis (DCIS), cN0, cM0, G3, ER+, SC+, HER2: Not Assessed) - Signed by Sunshine Wright MD on 3/27/2019      Prior Radiation Therapy? NO    Concurrent Chemo/radiation? NO    Prior Chemotherapy? NO    Prior Hormonal Therapy? NO    Head and Neck Cancer? No, patient does NOT have HN cancer.       LMP:     Age at first Menses: 6 yrs    : 0    Para: 0          Current Outpatient Medications:     omeprazole (PRILOSEC) 20 MG delayed release capsule, Take 1 capsule by mouth every morning (before breakfast), Disp: 90 capsule, Rfl: 0    losartan (COZAAR) 25 MG tablet, Take 1 tablet by mouth daily, Disp: 30 tablet, Rfl: 3    chlorthalidone (HYGROTON) 25 MG tablet, TAKE 1 TABLET BY MOUTH DAILY, Disp: 30 tablet, Rfl: 3    Multiple Vitamins-Minerals (EYE VITAMINS) CAPS, Take by mouth 2 times daily, Disp: , Rfl:     atorvastatin (LIPITOR) 40 MG tablet, Take 1 tablet by mouth daily (Patient taking differently: Take 40 mg by mouth nightly ), Disp: 90 tablet, Rfl: 1    potassium chloride (KLOR-CON M) 20 MEQ extended release tablet, Take 1 tablet by mouth 2 times daily (Patient taking differently: Take 40 mEq by mouth daily ), Disp: 60 tablet, Rfl: 5       Past Medical History:   Diagnosis Date    [facility] breast    Hyperlipidemia     Hypertension     Pneumonia 1953    Shingles        Past Surgical History:   Procedure Laterality Date    BREAST BIOPSY Left 2/27/2019    LEFT BREAST NEEDLE LOCALIZED  LUMPECTOMY  WITH BIOZORB  PLACEMENT --TRIDENT performed by Miya Ahn MD at . [address] LUMPECTOMY Left 3/26/2019    LEFT BREAST LUMPECTOMY RE-EXCISION ANTERIOR LATERAL SUPERIOR & POSTERIOR MARGINS , REPLACEMENT OF BIOZORB performed by [facility]ya Ahn MD at [facility]       Family History   Problem Relation Age of Onset    Parkinsonism Mother     Heart Attack Father     Cancer Father 80        leukemia    Cancer Maternal Aunt         Breast--60 or 76s    Cancer Maternal Grandfather         unknown    Cancer Paternal Cousin 12        leukemia       Social History     Socioeconomic History    Marital status: Single     Spouse name: Not on file    Number of children: Not on file    Years of education: Not on file    Highest education level: Not on file   Occupational History    Not on file   Social Needs    Financial resource strain: Not on file    Food insecurity:     Worry: Not on file     Inability: Not on file    Transportation needs:     Medical: Not on file     Non-medical: Not on file   Tobacco Use    Smoking status: Never Smoker    Smokeless tobacco: Never Used   Substance and Sexual Activity    Alcohol use:  Yes     Alcohol/week: 0.6 oz     Types: 1 Glasses of wine per week     Comment: occasionally    Drug use: No    Sexual activity: Never   Lifestyle    Physical activity:     Days per week: Not on file     Minutes per session: Not on file    Stress: Not on file   Relationships    Social connections:     Talks on phone: Not on file     Gets together: Not on file     Attends Confucianism service: Not on file     Active member of club or organization: Not on file     Attends meetings of clubs or organizations: Not on file     Relationship status: Not on file    Intimate partner violence:     Fear of current or ex partner: Not on file     Emotionally abused: Not on file     Physically abused: Not on file     Forced sexual activity: Not on file   Other Topics Concern    Not on file   Social History Narrative    Elisha Palma lives in [place] alone - retired from [employer]. Occupation: teacher  Retired:  YES: Patient is retired from [employer]. REVIEW OF SYSTEMS: patient states a hisory of hypertension, breast cancer. Pacemaker/Defibulator/ICD:  No    Mediport: No           FALLS RISK SCREENING ASSESSMENT    Instructions:  Assess the patient and [place] the appropriate indicators that are present for fall risk identification. Total the numbers circled and assign a fall risk score from Table 2.  Reassess patient at a minimum every 12 weeks or with status change. Assessment   Date  5/7/2019     1. Mental Ability: confusion/cognitively impaired No - 0       2. Elimination Issues: incontinence, frequency No - 0       3. Ambulatory: use of assistive devices (walker, cane, off-loading devices), attached to equipment (IV pole, oxygen) No - 0     4. Sensory Limitations: dizziness, vertigo, impaired vision No - 0       5. Age 72 years or greater - 1       10. Medication: diuretics, strong analgesics, hypnotics, sedatives, antihypertensive agents   Yes - 3   7. Falls:  recent history of falls within the last 3 months (not to include slipping or tripping)   No - 0   TOTAL 4    If score of 4 or greater was education given? Yes       TABLE 2   Risk Score Risk Level Plan of Care   0-3 Little or  No Risk 1. Provide assistance as indicated for ambulation activities  2. Reorient confused/cognitively impaired patient  3. Call-light/bell within patient's reach  4. Chair/bed in low position, stretcher/bed with siderails up except when performing patient care activities  5.   Educate patient/family/caregiver on falls prevention  6.  Reassess in 12 weeks or with any noted change in patient condition which places them at a risk for a fall   4-6 Moderate Risk 1. Provide assistance as indicated for ambulation activities  2. Reorient confused/cognitively impaired patient  3. Call-light/bell within patient's reach  4. Chair/bed in low position, stretcher/bed with siderails up except when performing patient care activities  5. Educate patient/family/caregiver on falls prevention  6. Falls risk precaution (Yellow sticker Level II) placed on patient chart   7 or   Higher High Risk 1. Place patient in easily observable treatment room  2. Patient attended at all times by family member or staff  3. Provide assistance as indicated for ambulation activities  4. Reorient confused/cognitively impaired patient  5. Call-light/bell within patient's reach  6. Chair/bed in low position, stretcher/bed with siderails up except when performing patient care activities  7. Educate patient/family/caregiver on falls prevention  8. Falls risk precaution (Yellow sticker Level III) placed on patient chart           MALNUTRITION RISK SCREENING ASSESSMENT    Instructions:  Assess the patient and enter the appropriate indicators that are present for nutrition risk identification. Total the numbers entered and assign a risk score. Follow the appropriate action for total score listed below. Assessment   Date  5/7/2019     1. Have you lost weight without trying? 0- No     2. Have you been eating poorly because of a decreased appetite? 0- No   3. Do you have a diagnosis of head and neck cancer?       0- No                                                                                    TOTAL 0        Score of 0-1: No action  Score 2 or greater:  · For Non-Diabetic Patient: Recommend adding Ensure Complete 2 x daily and provide patient with Ensure wellness bag with coupons  · For Diabetic Patient: Recommend adding Glucerna Shake 2 x daily and provide patient with Glucerna Wellness bag with coupons  · Route to the dietitian via  [address]

## 2019-05-08 ENCOUNTER — HOSPITAL ENCOUNTER (OUTPATIENT)
Dept: RADIATION ONCOLOGY | Age: 74
Discharge: HOME | End: 2019-05-08
Attending: RADIOLOGY
Payer: MEDICARE

## 2019-05-08 VITALS
BODY MASS INDEX: 32.58 KG/M2 | WEIGHT: 155.9 LBS | DIASTOLIC BLOOD PRESSURE: 66 MMHG | SYSTOLIC BLOOD PRESSURE: 140 MMHG | OXYGEN SATURATION: 94 % | HEART RATE: 101 BPM

## 2019-05-08 DIAGNOSIS — C80.1 CANCER (HCC): Primary | ICD-10-CM

## 2019-05-08 PROCEDURE — 77412 RADIATION TX DELIVERY LVL 3: CPT | Performed by: RADIOLOGY

## 2019-05-08 PROCEDURE — 77387 GUIDANCE FOR RADJ TX DLVR: CPT | Performed by: RADIOLOGY

## 2019-05-08 PROCEDURE — 99999 PR OFFICE/OUTPT VISIT,PROCEDURE ONLY: CPT | Performed by: RADIOLOGY

## 2019-05-08 NOTE — PROGRESS NOTES
DEPARTMENT OF RADIATION ONCOLOGY ON TREATMENT VISIT         5/8/2019      NAME:  Alfonso Chappell    YOB: 1945    Diagnosis: breast CA    SUBJECTIVE:   Alfonso Chappell has now received fractionated external beam radiation therapy - ongoing. Past medical, surgical, social and family histories reviewed and updated as indicated. Pain: controlled      ALLERGIES:  Poultry meal; Molds & smuts; Hydrocodone-acetaminophen; and Pcn [penicillins]         Current Outpatient Medications   Medication Sig Dispense Refill    omeprazole (PRILOSEC) 20 MG delayed release capsule Take 1 capsule by mouth every morning (before breakfast) 90 capsule 0    losartan (COZAAR) 25 MG tablet Take 1 tablet by mouth daily 30 tablet 3    chlorthalidone (HYGROTON) 25 MG tablet TAKE 1 TABLET BY MOUTH DAILY 30 tablet 3    Multiple Vitamins-Minerals (EYE VITAMINS) CAPS Take by mouth 2 times daily      atorvastatin (LIPITOR) 40 MG tablet Take 1 tablet by mouth daily (Patient taking differently: Take 40 mg by mouth nightly ) 90 tablet 1    potassium chloride (KLOR-CON M) 20 MEQ extended release tablet Take 1 tablet by mouth 2 times daily (Patient taking differently: Take 40 mEq by mouth daily ) 60 tablet 5     No current facility-administered medications for this encounter. OBJECTIVE:  Alert and fully ambulatory. Pleasant and conversant. Physical Examination: General appearance - alert, well appearing, and in no distress. Wt Readings from Last 3 Encounters:   05/08/19 155 lb 14.4 oz (70.7 kg)   05/07/19 156 lb 4.8 oz (70.9 kg)   04/22/19 156 lb 3.2 oz (70.9 kg)         ASSESSMENT/PLAN:     Patient is tolerating treatments well with expected toxicities. RBA were reviewed prior to first fraction and PRN. Current and planned dose reviewed. Goals of treatment and potential side effects were reviewed with the patient PRN.  Treatment imaging has been personally reviewed for accuracy and precision. Questions answered to apparent satisfaction. Treatments will continue as planned. Smita Villatoro.  Kj Almaguer MD MS DABR  Radiation Oncologist        [facility] ([address] [place] [address]): [phone] /// FAX: [phone]  [facility]): [phone] /// FAX: [phone]  [address] [place] [address]): [phone] /// FAX: [phone]

## 2019-05-09 PROCEDURE — 77412 RADIATION TX DELIVERY LVL 3: CPT | Performed by: RADIOLOGY

## 2019-05-09 PROCEDURE — 77387 GUIDANCE FOR RADJ TX DLVR: CPT | Performed by: RADIOLOGY

## 2019-05-10 PROCEDURE — 77412 RADIATION TX DELIVERY LVL 3: CPT | Performed by: RADIOLOGY

## 2019-05-10 PROCEDURE — 77387 GUIDANCE FOR RADJ TX DLVR: CPT | Performed by: RADIOLOGY

## 2019-05-10 NOTE — ADDENDUM NOTE
Encounter addended by: RADHA Colin on: 5/10/2019 12:33 PM   Actions taken: Charge Capture section accepted

## 2019-05-13 PROCEDURE — 77387 GUIDANCE FOR RADJ TX DLVR: CPT | Performed by: RADIOLOGY

## 2019-05-13 PROCEDURE — 77412 RADIATION TX DELIVERY LVL 3: CPT | Performed by: RADIOLOGY

## 2019-05-14 PROCEDURE — 77387 GUIDANCE FOR RADJ TX DLVR: CPT | Performed by: RADIOLOGY

## 2019-05-14 PROCEDURE — 77336 RADIATION PHYSICS CONSULT: CPT | Performed by: RADIOLOGY

## 2019-05-14 PROCEDURE — 77334 RADIATION TREATMENT AID(S): CPT | Performed by: RADIOLOGY

## 2019-05-14 PROCEDURE — 77412 RADIATION TX DELIVERY LVL 3: CPT | Performed by: RADIOLOGY

## 2019-05-14 PROCEDURE — 77307 TELETHX ISODOSE PLAN CPLX: CPT | Performed by: RADIOLOGY

## 2019-05-15 ENCOUNTER — HOSPITAL ENCOUNTER (OUTPATIENT)
Dept: RADIATION ONCOLOGY | Age: 74
Discharge: HOME | End: 2019-05-15
Attending: RADIOLOGY
Payer: MEDICARE

## 2019-05-15 VITALS
HEART RATE: 100 BPM | BODY MASS INDEX: 32.81 KG/M2 | SYSTOLIC BLOOD PRESSURE: 140 MMHG | WEIGHT: 157 LBS | DIASTOLIC BLOOD PRESSURE: 66 MMHG | OXYGEN SATURATION: 94 %

## 2019-05-15 PROCEDURE — 77387 GUIDANCE FOR RADJ TX DLVR: CPT | Performed by: RADIOLOGY

## 2019-05-15 PROCEDURE — 77412 RADIATION TX DELIVERY LVL 3: CPT | Performed by: RADIOLOGY

## 2019-05-15 NOTE — PROGRESS NOTES
Brian Parham  5/15/2019  Wt Readings from Last 3 Encounters:   05/15/19 157 lb (71.2 kg)   05/08/19 155 lb 14.4 oz (70.7 kg)   05/07/19 156 lb 4.8 oz (70.9 kg)     Body mass index is 32.81 kg/m². Treatment Area:left breast with boost    Patient was seen today for weekly visit. Comfort Alteration  KPS:90%  Fatigue: None    Nutritional Alteration  Anorexia: No   Nausea: No   Vomiting: No     Skin Alteration   Sensation:tenderness/soreness to deep breast tissue-not surface/skin issue    Radiation Dermatitis:  na    Mucous Membrane Alteration  Drainage: No  Lymphedema: Yes    Emotional  Coping: effective    Sexuality Alteration  na    Injury, potential bleeding or infection: na        Lab Results   Component Value Date    WBC 6.4 03/26/2019     03/26/2019         BP (!) 140/66   Pulse 100   Wt 157 lb (71.2 kg)   LMP  (LMP Unknown)   SpO2 94%   BMI 32.81 kg/m²   BP within normal range? yes         Assessment/Plan:patient has completed 6/21fractions, 1596 cGy/5256.      Daryl Sampson

## 2019-05-16 ENCOUNTER — HOSPITAL ENCOUNTER (OUTPATIENT)
Dept: RADIATION ONCOLOGY | Age: 74
Discharge: HOME | End: 2019-05-16
Attending: RADIOLOGY
Payer: MEDICARE

## 2019-05-16 DIAGNOSIS — C80.1 CANCER (HCC): Primary | ICD-10-CM

## 2019-05-16 PROCEDURE — 77387 GUIDANCE FOR RADJ TX DLVR: CPT | Performed by: RADIOLOGY

## 2019-05-16 PROCEDURE — 99999 PR OFFICE/OUTPT VISIT,PROCEDURE ONLY: CPT | Performed by: RADIOLOGY

## 2019-05-16 PROCEDURE — 77412 RADIATION TX DELIVERY LVL 3: CPT | Performed by: RADIOLOGY

## 2019-05-16 NOTE — PROGRESS NOTES
DEPARTMENT OF RADIATION ONCOLOGY ON TREATMENT VISIT         5/16/2019      NAME:  Marcelo Stephens    YOB: 1945    Diagnosis:  CA    SUBJECTIVE:   Marcelo Stephens has now received fractionated external beam radiation therapy - ongoing. Past medical, surgical, social and family histories reviewed and updated as indicated. Pain: controlled      ALLERGIES:  Poultry meal; Molds & smuts; Hydrocodone-acetaminophen; and Pcn [penicillins]         Current Outpatient Medications   Medication Sig Dispense Refill    omeprazole (PRILOSEC) 20 MG delayed release capsule Take 1 capsule by mouth every morning (before breakfast) 90 capsule 0    losartan (COZAAR) 25 MG tablet Take 1 tablet by mouth daily 30 tablet 3    chlorthalidone (HYGROTON) 25 MG tablet TAKE 1 TABLET BY MOUTH DAILY 30 tablet 3    Multiple Vitamins-Minerals (EYE VITAMINS) CAPS Take by mouth 2 times daily      atorvastatin (LIPITOR) 40 MG tablet Take 1 tablet by mouth daily (Patient taking differently: Take 40 mg by mouth nightly ) 90 tablet 1    potassium chloride (KLOR-CON M) 20 MEQ extended release tablet Take 1 tablet by mouth 2 times daily (Patient taking differently: Take 40 mEq by mouth daily ) 60 tablet 5     No current facility-administered medications for this encounter. OBJECTIVE:  Alert and fully ambulatory. Pleasant and conversant. Physical Examination: General appearance - alert, well appearing, and in no distress. Wt Readings from Last 3 Encounters:   05/15/19 157 lb (71.2 kg)   05/08/19 155 lb 14.4 oz (70.7 kg)   05/07/19 156 lb 4.8 oz (70.9 kg)         ASSESSMENT/PLAN:     Patient is tolerating treatments well with expected toxicities. RIBA were reviewed prior to first fraction and PRN. Current and planned dose reviewed. Goals of treatment and potential side effects were reviewed with the patient PRN.  Treatment imaging has been personally reviewed for accuracy and precision. Questions answered to apparent satisfaction. Treatments will continue as planned. Indiana Burns.  Maksim Castro MD MS DABR  Radiation Oncologist        [facility] ([facility]): [phone] /// FAX: [phone]  [facility]): [phone] /// FAX: [phone]  [address] [place] [address]): [phone] /// FAX: [phone]

## 2019-05-17 PROCEDURE — 77387 GUIDANCE FOR RADJ TX DLVR: CPT | Performed by: RADIOLOGY

## 2019-05-17 PROCEDURE — 77412 RADIATION TX DELIVERY LVL 3: CPT | Performed by: RADIOLOGY

## 2019-05-20 PROCEDURE — 77387 GUIDANCE FOR RADJ TX DLVR: CPT | Performed by: RADIOLOGY

## 2019-05-20 PROCEDURE — 77412 RADIATION TX DELIVERY LVL 3: CPT | Performed by: RADIOLOGY

## 2019-05-21 PROCEDURE — 77387 GUIDANCE FOR RADJ TX DLVR: CPT | Performed by: RADIOLOGY

## 2019-05-21 PROCEDURE — 77336 RADIATION PHYSICS CONSULT: CPT | Performed by: RADIOLOGY

## 2019-05-21 PROCEDURE — 77412 RADIATION TX DELIVERY LVL 3: CPT | Performed by: RADIOLOGY

## 2019-05-22 ENCOUNTER — HOSPITAL ENCOUNTER (OUTPATIENT)
Dept: RADIATION ONCOLOGY | Age: 74
Discharge: HOME | End: 2019-05-22
Attending: RADIOLOGY
Payer: MEDICARE

## 2019-05-22 VITALS
WEIGHT: 155 LBS | OXYGEN SATURATION: 95 % | DIASTOLIC BLOOD PRESSURE: 64 MMHG | BODY MASS INDEX: 32.4 KG/M2 | HEART RATE: 80 BPM | SYSTOLIC BLOOD PRESSURE: 138 MMHG

## 2019-05-22 DIAGNOSIS — C80.1 CANCER (HCC): Primary | ICD-10-CM

## 2019-05-22 PROCEDURE — 99999 PR OFFICE/OUTPT VISIT,PROCEDURE ONLY: CPT | Performed by: RADIOLOGY

## 2019-05-22 PROCEDURE — 77387 GUIDANCE FOR RADJ TX DLVR: CPT | Performed by: RADIOLOGY

## 2019-05-22 PROCEDURE — 77412 RADIATION TX DELIVERY LVL 3: CPT | Performed by: RADIOLOGY

## 2019-05-22 NOTE — PROGRESS NOTES
DEPARTMENT OF RADIATION ONCOLOGY ON TREATMENT VISIT         5/22/2019      NAME:  Fco Villalobos    YOB: 1945      Diagnosis:  Left breast CA      SUBJECTIVE:   Fco Villalobos has now received 2926 cGy in 11/21 fractions directed to the L breast.        Past medical, surgical, social and family histories reviewed and updated as indicated. Pain: controlled      ALLERGIES:  Poultry meal; Molds & smuts; Hydrocodone-acetaminophen; and Pcn [penicillins]         Current Outpatient Medications   Medication Sig Dispense Refill    omeprazole (PRILOSEC) 20 MG delayed release capsule Take 1 capsule by mouth every morning (before breakfast) 90 capsule 0    losartan (COZAAR) 25 MG tablet Take 1 tablet by mouth daily 30 tablet 3    chlorthalidone (HYGROTON) 25 MG tablet TAKE 1 TABLET BY MOUTH DAILY 30 tablet 3    Multiple Vitamins-Minerals (EYE VITAMINS) CAPS Take by mouth 2 times daily      atorvastatin (LIPITOR) 40 MG tablet Take 1 tablet by mouth daily (Patient taking differently: Take 40 mg by mouth nightly ) 90 tablet 1    potassium chloride (KLOR-CON M) 20 MEQ extended release tablet Take 1 tablet by mouth 2 times daily (Patient taking differently: Take 40 mEq by mouth daily ) 60 tablet 5     No current facility-administered medications for this encounter. OBJECTIVE:  Alert and fully ambulatory. Pleasant and conversant. Physical Examination: General appearance - alert, well appearing, and in no distress. Wt Readings from Last 3 Encounters:   05/22/19 155 lb (70.3 kg)   05/15/19 157 lb (71.2 kg)   05/08/19 155 lb 14.4 oz (70.7 kg)         ASSESSMENT/PLAN:     Patient is tolerating treatments well with expected toxicities. RBA were reviewed prior to first fraction and PRN. Current and planned dose reviewed. Goals of treatment and potential side effects were reviewed with the patient PRN.  Treatment imaging has been personally reviewed for accuracy and precision. Questions answered to apparent satisfaction. Treatments will continue as planned. [facility].  Jerson Akins MD MS DABR  Radiation Oncologist          [facility] [address] ([address] [place] [facility]""): [phone] /// FAX: [phone]  [facility] [place]): [phone] /// FAX: [phone] [facility]): [phone] /// FAX: [phone]

## 2019-05-22 NOTE — PROGRESS NOTES
Fallon Peaks  5/22/2019  Wt Readings from Last 3 Encounters:   05/15/19 157 lb (71.2 kg)   05/08/19 155 lb 14.4 oz (70.7 kg)   05/07/19 156 lb 4.8 oz (70.9 kg)     There is no height or weight on file to calculate BMI. Treatment Area:left whole breast with boost    Patient was seen today for weekly visit. Comfort Alteration  KPS:90%  Fatigue: None    Nutritional Alteration  Anorexia: No   Nausea: No   Vomiting: No     Skin Alteration   Sensation:na    Radiation Dermatitis:  na    Mucous Membrane Alteration  Drainage: No  Lymphedema: No    Emotional  Coping: effective    Sexuality Alteration  na    Injury, potential bleeding or infection: na        Lab Results   Component Value Date    WBC 6.4 03/26/2019     03/26/2019         LMP  (LMP Unknown)   BP within normal range? yes         Assessment/Plan: patient has completed 11/21 fractions, 2926cGy/5256.     Britni Rodríguez

## 2019-05-23 ENCOUNTER — HOSPITAL ENCOUNTER (OUTPATIENT)
Dept: RADIATION ONCOLOGY | Age: 74
Discharge: HOME | End: 2019-05-23
Attending: RADIOLOGY
Payer: MEDICARE

## 2019-05-23 PROCEDURE — 77387 GUIDANCE FOR RADJ TX DLVR: CPT | Performed by: RADIOLOGY

## 2019-05-23 PROCEDURE — 77412 RADIATION TX DELIVERY LVL 3: CPT | Performed by: RADIOLOGY

## 2019-05-24 ENCOUNTER — HOSPITAL ENCOUNTER (OUTPATIENT)
Dept: RADIATION ONCOLOGY | Age: 74
Discharge: HOME | End: 2019-05-24
Attending: RADIOLOGY
Payer: MEDICARE

## 2019-05-24 PROCEDURE — 77412 RADIATION TX DELIVERY LVL 3: CPT | Performed by: RADIOLOGY

## 2019-05-24 PROCEDURE — 77387 GUIDANCE FOR RADJ TX DLVR: CPT | Performed by: RADIOLOGY

## 2019-05-28 ENCOUNTER — HOSPITAL ENCOUNTER (OUTPATIENT)
Dept: RADIATION ONCOLOGY | Age: 74
Discharge: HOME | End: 2019-05-28
Attending: RADIOLOGY
Payer: MEDICARE

## 2019-05-28 PROCEDURE — 77412 RADIATION TX DELIVERY LVL 3: CPT | Performed by: RADIOLOGY

## 2019-05-28 PROCEDURE — 77387 GUIDANCE FOR RADJ TX DLVR: CPT | Performed by: RADIOLOGY

## 2019-05-29 ENCOUNTER — TELEPHONE (OUTPATIENT)
Dept: RADIATION ONCOLOGY | Age: 74
End: 2019-05-29

## 2019-05-29 ENCOUNTER — HOSPITAL ENCOUNTER (OUTPATIENT)
Dept: RADIATION ONCOLOGY | Age: 74
Discharge: HOME | End: 2019-05-29
Attending: RADIOLOGY
Payer: MEDICARE

## 2019-05-29 VITALS
BODY MASS INDEX: 32.6 KG/M2 | SYSTOLIC BLOOD PRESSURE: 132 MMHG | HEART RATE: 88 BPM | OXYGEN SATURATION: 98 % | WEIGHT: 156 LBS | DIASTOLIC BLOOD PRESSURE: 70 MMHG

## 2019-05-29 DIAGNOSIS — C80.1 CANCER (HCC): Primary | ICD-10-CM

## 2019-05-29 PROCEDURE — 77336 RADIATION PHYSICS CONSULT: CPT | Performed by: RADIOLOGY

## 2019-05-29 PROCEDURE — 77387 GUIDANCE FOR RADJ TX DLVR: CPT | Performed by: RADIOLOGY

## 2019-05-29 PROCEDURE — 99999 PR OFFICE/OUTPT VISIT,PROCEDURE ONLY: CPT | Performed by: RADIOLOGY

## 2019-05-29 PROCEDURE — 77412 RADIATION TX DELIVERY LVL 3: CPT | Performed by: RADIOLOGY

## 2019-05-29 NOTE — PROGRESS NOTES
Marcelo Kat  5/29/2019  Wt Readings from Last 3 Encounters:   05/22/19 155 lb (70.3 kg)   05/15/19 157 lb (71.2 kg)   05/08/19 155 lb 14.4 oz (70.7 kg)     There is no height or weight on file to calculate BMI. Treatment Area:left whole breast with boost    Patient was seen today for weekly visit. Comfort Alteration  KPS:90%  Fatigue: None    Nutritional Alteration  Anorexia: No   Nausea: No   Vomiting: No     Skin Alteration   Sensation:na    Radiation Dermatitis:  na    Mucous Membrane Alteration  Drainage: No  Lymphedema: No    Emotional  Coping: effective    Sexuality Alteration  na    Injury, potential bleeding or infection: na        Lab Results   Component Value Date    WBC 6.4 03/26/2019     03/26/2019         LMP  (LMP Unknown)   BP within normal range? yes       Assessment/Plan: patient has completed 15/21 fractions, 3990 cGy/5256.      Edilia Parekh
accuracy and precision. Questions answered to apparent satisfaction. Treatments will continue as planned. Lorenzo Mccoy.  Manuel Barba MD MS SARAR  Radiation Oncologist        [facility] ([address] [place] [address]): [phone] /// FAX: [phone]  [facility]): [phone] /// FAX: [phone]  [address] [place] [address]): [phone] /// FAX: [phone]

## 2019-05-30 ENCOUNTER — HOSPITAL ENCOUNTER (OUTPATIENT)
Dept: RADIATION ONCOLOGY | Age: 74
Discharge: HOME | End: 2019-05-30
Attending: RADIOLOGY
Payer: MEDICARE

## 2019-05-30 PROCEDURE — 77387 GUIDANCE FOR RADJ TX DLVR: CPT | Performed by: RADIOLOGY

## 2019-05-30 PROCEDURE — 77412 RADIATION TX DELIVERY LVL 3: CPT | Performed by: RADIOLOGY

## 2019-05-31 ENCOUNTER — HOSPITAL ENCOUNTER (OUTPATIENT)
Dept: RADIATION ONCOLOGY | Age: 74
Discharge: HOME | End: 2019-05-31
Attending: RADIOLOGY
Payer: MEDICARE

## 2019-05-31 PROCEDURE — 77387 GUIDANCE FOR RADJ TX DLVR: CPT | Performed by: RADIOLOGY

## 2019-05-31 PROCEDURE — 77412 RADIATION TX DELIVERY LVL 3: CPT | Performed by: RADIOLOGY

## 2019-06-03 ENCOUNTER — HOSPITAL ENCOUNTER (OUTPATIENT)
Dept: RADIATION ONCOLOGY | Age: 74
Discharge: HOME | End: 2019-06-03
Attending: RADIOLOGY
Payer: MEDICARE

## 2019-06-03 PROCEDURE — 77387 GUIDANCE FOR RADJ TX DLVR: CPT | Performed by: RADIOLOGY

## 2019-06-03 PROCEDURE — 77412 RADIATION TX DELIVERY LVL 3: CPT | Performed by: RADIOLOGY

## 2019-06-04 ENCOUNTER — HOSPITAL ENCOUNTER (OUTPATIENT)
Dept: RADIATION ONCOLOGY | Age: 74
Discharge: HOME | End: 2019-06-04
Attending: RADIOLOGY
Payer: MEDICARE

## 2019-06-04 PROCEDURE — 77387 GUIDANCE FOR RADJ TX DLVR: CPT | Performed by: RADIOLOGY

## 2019-06-04 PROCEDURE — 77412 RADIATION TX DELIVERY LVL 3: CPT | Performed by: RADIOLOGY

## 2019-06-05 ENCOUNTER — HOSPITAL ENCOUNTER (OUTPATIENT)
Dept: RADIATION ONCOLOGY | Age: 74
Discharge: HOME | End: 2019-06-05
Attending: RADIOLOGY
Payer: MEDICARE

## 2019-06-05 ENCOUNTER — OFFICE VISIT (OUTPATIENT)
Dept: FAMILY MEDICINE CLINIC | Age: 74
End: 2019-06-05
Payer: MEDICARE

## 2019-06-05 ENCOUNTER — HOSPITAL ENCOUNTER (OUTPATIENT)
Age: 74
Discharge: HOME | End: 2019-06-07
Payer: MEDICARE

## 2019-06-05 VITALS
HEART RATE: 84 BPM | SYSTOLIC BLOOD PRESSURE: 130 MMHG | WEIGHT: 157 LBS | DIASTOLIC BLOOD PRESSURE: 75 MMHG | BODY MASS INDEX: 32.81 KG/M2 | OXYGEN SATURATION: 97 %

## 2019-06-05 VITALS
WEIGHT: 157 LBS | DIASTOLIC BLOOD PRESSURE: 75 MMHG | HEIGHT: 58 IN | SYSTOLIC BLOOD PRESSURE: 138 MMHG | BODY MASS INDEX: 32.95 KG/M2 | HEART RATE: 81 BPM | OXYGEN SATURATION: 98 % | RESPIRATION RATE: 18 BRPM

## 2019-06-05 DIAGNOSIS — R30.0 DYSURIA: ICD-10-CM

## 2019-06-05 DIAGNOSIS — E78.2 MIXED HYPERLIPIDEMIA: ICD-10-CM

## 2019-06-05 DIAGNOSIS — L24.7 IRRITANT CONTACT DERMATITIS DUE TO PLANTS, EXCEPT FOOD: ICD-10-CM

## 2019-06-05 DIAGNOSIS — K21.9 GASTROESOPHAGEAL REFLUX DISEASE, ESOPHAGITIS PRESENCE NOT SPECIFIED: Primary | ICD-10-CM

## 2019-06-05 DIAGNOSIS — I10 ESSENTIAL HYPERTENSION: ICD-10-CM

## 2019-06-05 DIAGNOSIS — C80.1 CANCER (HCC): Primary | ICD-10-CM

## 2019-06-05 LAB
BILIRUBIN, POC: NORMAL
BLOOD URINE, POC: NORMAL
CLARITY, POC: CLEAR
COLOR, POC: YELLOW
GLUCOSE URINE, POC: NORMAL
KETONES, POC: NORMAL
LEUKOCYTE EST, POC: NORMAL
NITRITE, POC: NORMAL
PH, POC: 6
PROTEIN, POC: NORMAL
SPECIFIC GRAVITY, POC: 1.02
UROBILINOGEN, POC: 0.2

## 2019-06-05 PROCEDURE — 77336 RADIATION PHYSICS CONSULT: CPT | Performed by: RADIOLOGY

## 2019-06-05 PROCEDURE — 81002 URINALYSIS NONAUTO W/O SCOPE: CPT | Performed by: FAMILY MEDICINE

## 2019-06-05 PROCEDURE — 77387 GUIDANCE FOR RADJ TX DLVR: CPT | Performed by: RADIOLOGY

## 2019-06-05 PROCEDURE — 99213 OFFICE O/P EST LOW 20 MIN: CPT | Performed by: FAMILY MEDICINE

## 2019-06-05 PROCEDURE — 77412 RADIATION TX DELIVERY LVL 3: CPT | Performed by: RADIOLOGY

## 2019-06-05 PROCEDURE — 87088 URINE BACTERIA CULTURE: CPT

## 2019-06-05 PROCEDURE — 99999 PR OFFICE/OUTPT VISIT,PROCEDURE ONLY: CPT | Performed by: RADIOLOGY

## 2019-06-05 RX ORDER — CHLORTHALIDONE 25 MG/1
25 TABLET ORAL DAILY
Qty: 30 TABLET | Refills: 3 | Status: SHIPPED | OUTPATIENT
Start: 2019-06-05 | End: 2019-07-29 | Stop reason: SDUPTHER

## 2019-06-05 RX ORDER — RANITIDINE 150 MG/1
150 TABLET ORAL PRN
Qty: 60 TABLET | Refills: 1 | Status: SHIPPED | OUTPATIENT
Start: 2019-06-05 | End: 2019-10-24

## 2019-06-05 RX ORDER — ATORVASTATIN CALCIUM 40 MG/1
40 TABLET, FILM COATED ORAL NIGHTLY
Qty: 30 TABLET | Refills: 3 | Status: SHIPPED | OUTPATIENT
Start: 2019-06-05 | End: 2019-11-28 | Stop reason: SDUPTHER

## 2019-06-05 RX ORDER — POTASSIUM CHLORIDE 20 MEQ/1
40 TABLET, EXTENDED RELEASE ORAL DAILY
Qty: 60 TABLET | Refills: 3 | Status: SHIPPED | OUTPATIENT
Start: 2019-06-05 | End: 2019-10-02 | Stop reason: SDUPTHER

## 2019-06-05 RX ORDER — LOSARTAN POTASSIUM 25 MG/1
25 TABLET ORAL DAILY
Qty: 30 TABLET | Refills: 3 | Status: SHIPPED | OUTPATIENT
Start: 2019-06-05 | End: 2019-07-29 | Stop reason: SDUPTHER

## 2019-06-05 NOTE — PROGRESS NOTES
Follow up of GERD  On omeprazole and improved  Dysuria for a month  Rash of legs improving  Examination  Blood pressure 138/75, pulse 81, resp. rate 18, height 4' 10\" (1.473 m), weight 157 lb (71.2 kg), SpO2 98 %, not currently breastfeeding. Check urine  Ranitidine prn  Attending Physician Statement  I have discussed the case, including pertinent history and exam findings with the resident. I agree with the documented assessment and plan.

## 2019-06-05 NOTE — PROGRESS NOTES
Marcelo Kat  6/5/2019  Wt Readings from Last 3 Encounters:   06/05/19 157 lb (71.2 kg)   05/29/19 156 lb (70.8 kg)   05/22/19 155 lb (70.3 kg)     Body mass index is 32.81 kg/m². Treatment Area:L breast   Patient was seen today for weekly visit. Comfort Alteration  KPS:90%  Fatigue: None    Nutritional Alteration  Anorexia: No   Nausea: Yes and No   Vomiting: No     Skin Alteration   Sensation:itch    Radiation Dermatitis:  Slight erythema    Mucous Membrane Alteration  Drainage: No  Lymphedema: No    Emotional  Coping: effective    Sexuality Alteration  absent    Injury, potential bleeding or infection: na        Lab Results   Component Value Date    WBC 6.4 03/26/2019     03/26/2019         /75   Pulse 84   Wt 157 lb (71.2 kg)   LMP  (LMP Unknown)   SpO2 97%   BMI 32.81 kg/m²   BP within normal range?  yes   -if no, manually recheck in 5-10 min        Assessment/Plan: 20/5056cGy  Completes tomorrow, dishcrge reviewed    Ivet Larson
precision. Questions answered to apparent satisfaction. Treatments will continue as planned. Ephraim Ulrich.  Shahzad Scherer MD MS DABR  Radiation Oncologist        [facility]): [phone] /// FAX: [phone]  [facility]): [phone] /// FAX: [phone]  [address] [place] [address]): [phone] /// FAX: [phone]

## 2019-06-05 NOTE — PROGRESS NOTES
Chief Complaint   Patient presents with    Hypertension       HPI:  The patient is a 76 y.o. female who presented to the office today to follow up on GERD and had couple other issues she wanted to discuss.     Presumed GERD  - Bad taste in mouth with certain foods  - with acidic foods, pizza, lasagna   - has stayed on the 20 mg omeprazole, forgot I had instructed her to increase this to 40 mg daily if needed  - baseline was daily bad taste in her mouth, since being on the 20 mg daily, she only has intermittent episodes with certain foods 3-4/ week       Dysuria  - for the past 1 month    - no discharge  - no fever no chills  - no flank pain  - no blood   - no urgency  - increased frequency but also drinking more water recently   - No history of uti   - correlates with the time she has been receiving radision of the left breast   - she does experience some pain when wiping with toilet paper       Rash  - on left thigh and knee  - since last Tuesday  - used hydrocortisone cream which has helped   - itches but not painful  - was doing some work outside and thinks this may have been where she was exposed    Ductal in situ carcinoma   - finishing up with radiation tomorrow  - will start estrogen inhibitor next week    Past Medical History:   Diagnosis Date    Cancer ([facility] [address] 2019    breast    Hyperlipidemia     Hypertension     Pneumonia 1953    Shingles          Current Outpatient Medications:     ranitidine (ZANTAC) 150 MG tablet, Take 1 tablet by mouth as needed for Heartburn, Disp: 60 tablet, Rfl: 1    losartan (COZAAR) 25 MG tablet, Take 1 tablet by mouth daily, Disp: 30 tablet, Rfl: 3    chlorthalidone (HYGROTON) 25 MG tablet, Take 1 tablet by mouth daily, Disp: 30 tablet, Rfl: 3    potassium chloride (KLOR-CON M) 20 MEQ extended release tablet, Take 2 tablets by mouth daily, Disp: 60 tablet, Rfl: 3    atorvastatin (LIPITOR) 40 MG tablet, Take 1 tablet by mouth nightly, Disp: 30 tablet, Rfl: 3   omeprazole (PRILOSEC) 20 MG delayed release capsule, Take 1 capsule by mouth every morning (before breakfast), Disp: 90 capsule, Rfl: 0    Multiple Vitamins-Minerals (EYE VITAMINS) CAPS, Take by mouth 2 times daily, Disp: , Rfl:     Allergies   Allergen Reactions    Poultry Meal Hives    Molds & Smuts     Hydrocodone-Acetaminophen Rash    Pcn [Penicillins] Rash       Family History   Problem Relation Age of Onset    Parkinsonism Mother     Heart Attack Father     Cancer Father 80        leukemia    Cancer Maternal Aunt         Breast--60 or 76s    Cancer Maternal Grandfather         unknown    Cancer Paternal Cousin 12        leukemia       Past Surgical History:   Procedure Laterality Date    BREAST BIOPSY Left 2/27/2019    LEFT BREAST NEEDLE LOCALIZED  LUMPECTOMY  WITH BIOZORB  PLACEMENT --TRIDENT performed by Desiree Fisher MD at . [facility] LUMPECTOMY Left 3/26/2019    LEFT BREAST LUMPECTOMY RE-EXCISION ANTERIOR LATERAL SUPERIOR & POSTERIOR MARGINS , REPLACEMENT OF BIOZORB performed by Desiree Fisher MD at [place] [address]       Social History     Tobacco Use    Smoking status: Never Smoker    Smokeless tobacco: Never Used   Substance Use Topics    Alcohol use: Yes     Alcohol/week: 0.6 oz     Types: 1 Glasses of wine per week     Comment: occasionally    Drug use: No       /75   Pulse 81   Resp 18   Ht 4' 10\" (1.473 m)   Wt 157 lb (71.2 kg)   LMP  (LMP Unknown)   SpO2 98%   BMI 32.81 kg/m²     Review of Systems   Constitutional: Negative for chills and fever. Respiratory: Negative for cough and shortness of breath. Cardiovascular: Negative for chest pain and palpitations. Gastrointestinal: Negative for abdominal pain, constipation, diarrhea, nausea and vomiting. Genitourinary: Positive for dysuria and frequency. Negative for hematuria and urgency. Skin: Positive for rash. Neurological: Negative for dizziness, light-headedness and headaches. Physical Exam   Constitutional: She is oriented to person, place, and time. She appears well-developed and well-nourished. No distress. HENT:   Head: Normocephalic and atraumatic. Mouth/Throat: Oropharynx is clear and moist.   Eyes: Conjunctivae are normal.   Neck: Neck supple. Cardiovascular: Normal rate, regular rhythm and normal heart sounds. Exam reveals no gallop and no friction rub. No murmur heard. Pulmonary/Chest: Effort normal and breath sounds normal. No respiratory distress. She has no wheezes. She has no rales. Abdominal: Soft. She exhibits no distension. There is no tenderness. There is no rebound and no guarding. Musculoskeletal: She exhibits no edema. Legs:  Neurological: She is alert and oriented to person, place, and time. Skin: Skin is warm. She is not diaphoretic. Psychiatric: She has a normal mood and affect. Her behavior is normal.   Vitals reviewed. ASSESSMENT      Diagnosis Orders   1. Gastroesophageal reflux disease, esophagitis presence not specified  ranitidine (ZANTAC) 150 MG tablet   2. Dysuria  POCT Urinalysis no Micro    URINE CULTURE   3. Essential hypertension  losartan (COZAAR) 25 MG tablet    chlorthalidone (HYGROTON) 25 MG tablet    potassium chloride (KLOR-CON M) 20 MEQ extended release tablet   4. Mixed hyperlipidemia  atorvastatin (LIPITOR) 40 MG tablet   5. Irritant contact dermatitis due to plants, except food           PLAN:     Orders Placed This Encounter   Procedures    URINE CULTURE     Standing Status:   Future     Number of Occurrences:   1     Standing Expiration Date:   6/5/2020     Order Specific Question:   Specify (ex-cath, midstream, cysto, etc)? Answer:   midstream    POCT Urinalysis no Micro       Jose De Jesus Ort was seen today for hypertension. Diagnoses and all orders for this visit:    1.  Gastroesophageal reflux disease, esophagitis presence not risks, benefits and alternatives to treatment; patient verbalizes understanding, agrees, feels comfortable with and wishes to proceed with above treatment plan. Advised patient to call with any new medication issues, and read all Rx info from pharmacy to assure aware of all possible risks and side effects of medication before taking. Patient verbalizes understanding and agrees with above counseling, assessment and plan. All questions answered. La Nena Peralta was instructed to call if any new symptoms develop prior to next visit.        Ryder Loco M.D  Family Medicine Resident PGY2  [facility]   6/5/19

## 2019-06-06 ENCOUNTER — HOSPITAL ENCOUNTER (OUTPATIENT)
Dept: RADIATION ONCOLOGY | Age: 74
Discharge: HOME | End: 2019-06-06
Attending: RADIOLOGY
Payer: MEDICARE

## 2019-06-06 PROCEDURE — 77387 GUIDANCE FOR RADJ TX DLVR: CPT | Performed by: RADIOLOGY

## 2019-06-06 PROCEDURE — 77412 RADIATION TX DELIVERY LVL 3: CPT | Performed by: RADIOLOGY

## 2019-06-06 ASSESSMENT — ENCOUNTER SYMPTOMS
NAUSEA: 0
VOMITING: 0
SHORTNESS OF BREATH: 0
DIARRHEA: 0
COUGH: 0
ABDOMINAL PAIN: 0
CONSTIPATION: 0

## 2019-06-08 LAB — URINE CULTURE, ROUTINE: NORMAL

## 2019-06-18 ENCOUNTER — HOSPITAL ENCOUNTER (OUTPATIENT)
Dept: INFUSION THERAPY | Age: 74
Discharge: HOME | End: 2019-06-18
Payer: MEDICARE

## 2019-06-18 ENCOUNTER — OFFICE VISIT (OUTPATIENT)
Dept: ONCOLOGY | Age: 74
End: 2019-06-18
Payer: MEDICARE

## 2019-06-18 VITALS
HEART RATE: 85 BPM | WEIGHT: 158.6 LBS | BODY MASS INDEX: 33.29 KG/M2 | SYSTOLIC BLOOD PRESSURE: 160 MMHG | DIASTOLIC BLOOD PRESSURE: 68 MMHG | TEMPERATURE: 97.7 F | HEIGHT: 58 IN

## 2019-06-18 DIAGNOSIS — D05.12 DUCTAL CARCINOMA IN SITU (DCIS) OF LEFT BREAST: ICD-10-CM

## 2019-06-18 PROCEDURE — 99212 OFFICE O/P EST SF 10 MIN: CPT | Performed by: INTERNAL MEDICINE

## 2019-06-18 PROCEDURE — 99214 OFFICE O/P EST MOD 30 MIN: CPT | Performed by: INTERNAL MEDICINE

## 2019-06-18 RX ORDER — ANASTROZOLE 1 MG/1
1 TABLET ORAL DAILY
Qty: 30 TABLET | Refills: 1 | Status: SHIPPED | OUTPATIENT
Start: 2019-06-18 | End: 2019-07-19 | Stop reason: SDUPTHER

## 2019-06-18 NOTE — PROGRESS NOTES
[address] MED ONCOLOGY  [address] [address]  Dept: [phone]  Attending Progress note      Reason for Visit:   Left Breast DCIS. Referring Physician:  Brenda Munoz MD.    PCP:  Jin Cervantes MD    History of Present Illness: The patient is a pleasant 68-year-old lady, with a past medical history significant for hyperlipidemia, and hypertension, who had presented with an abnormal screening Mammogram:  MAMMOGRAM (12/11/2018): INDICATION:   Screening.       HISTORY:   The patient has no personal history of cancer. The patient has the following family history of breast cancer:  maternal aunt.       MAMMOGRAM VIEWS:   The following mammographic views where obtained: bilateral craniocaudal; bilateral craniocaudal with tomosynthesis; bilateral mediolateral oblique; and bilateral mediolateral oblique with tomosynthesis       TOMOSYNTHESIS:   Tomosynthesis (3 Dimensional Breast Imaging) was used on this examination to aid in evaluation.       COMPARISON:   No prior imaging studies are available for comparison.       CAD:   This exam was reviewed using the Signal Data Computer Aided Detection (CAD)       TISSUE DENSITY:   The breasts are heterogeneously dense (Type 3 density).       MAMMOGRAM FINDINGS:   In the right breast, no suspicious masses, areas of suspicious architectural distortion, suspicious calcifications, or additional suspicious findings are identified.           Finding 1:   There are coarse heterogeneous and fine pleomorphic calcifications measuring 17 mm seen in the upper outer quadrant of the left breast.       IMPRESSION:   Calcifications in the left breast require additional evaluation.    Spot magnification views with consideration for a stereotactic biopsy if needed.       =======================================   BI-RADS Category 0:  Incomplete: Need Additional Imaging Evaluation   =======================================     Ductal carcinoma in situ (DCIS) of left breast    Gastroesophageal reflux disease        Past Surgical History:      Procedure Laterality Date    BREAST BIOPSY Left 2/27/2019    LEFT BREAST NEEDLE LOCALIZED  LUMPECTOMY  WITH BIOZORB  PLACEMENT --TRIDENT performed by Keaton Hannah MD at . [address] LUMPECTOMY Left 3/26/2019    LEFT BREAST LUMPECTOMY RE-EXCISION ANTERIOR LATERAL SUPERIOR & POSTERIOR MARGINS , REPLACEMENT OF BIOZORB performed by Keaton Hannah MD at [place] [address]       Family History:  Family History   Problem Relation Age of Onset   [facility] Parkinsonism Mother     Heart Attack Father     Cancer Father 80        leukemia    Cancer Maternal Aunt         Breast--60 or 76s    Cancer Maternal Grandfather         unknown    Cancer Paternal Cousin 16        leukemia       Medications:  Reviewed and reconciled. Social History:  Social History     Socioeconomic History    Marital status: Single     Spouse name: Not on file    Number of children: Not on file    Years of education: Not on file    Highest education level: Not on file   Occupational History    Not on file   Social Needs    Financial resource strain: Not on file    Food insecurity:     Worry: Not on file     Inability: Not on file    Transportation needs:     Medical: Not on file     Non-medical: Not on file   Tobacco Use    Smoking status: Never Smoker    Smokeless tobacco: Never Used   Substance and Sexual Activity    Alcohol use:  Yes     Alcohol/week: 0.6 oz     Types: 1 Glasses of wine per week     Comment: occasionally    Drug use: No    Sexual activity: Never   Lifestyle    Physical activity:     Days per week: Not on file     Minutes per session: Not on file    Stress: Not on file   Relationships    Social connections:     Talks on phone: Not on file     Gets together: Not on file     Attends Buddhism service: Not on file Active member of club or organization: Not on file     Attends meetings of clubs or organizations: Not on file     Relationship status: Not on file    Intimate partner violence:     Fear of current or ex partner: Not on file     Emotionally abused: Not on file     Physically abused: Not on file     Forced sexual activity: Not on file   Other Topics Concern    Not on file   Social History Narrative    Lashon Shelton lives in [place] alone - retired from [employer]. Allergies: Allergies   Allergen Reactions    Poultry Meal Hives    Molds & Smuts     Hydrocodone-Acetaminophen Rash    Pcn [Penicillins] Rash     OB/GYN:  Age of menarche was 8. Age of menopause was 61. Last menstrual period was age 61. Patient denies hormonal therapy. Patient is     Physical Exam:  BP (!) 160/68 (Site: Right Upper Arm, Position: Sitting, Cuff Size: Medium Adult)   Pulse 85   Temp 97.7 °F (36.5 °C) (Temporal)   Ht 4' 10\" (1.473 m)   Wt 158 lb 9.6 oz (71.9 kg)   LMP  (LMP Unknown)   BMI 33.15 kg/m²   GENERAL: Alert, oriented x 3, not in acute distress. HEENT: PERRLA; EOMI. Oropharynx clear. NECK: Supple. No palpable cervical or supraclavicular lymphadenopathy. LUNGS: Good air entry bilaterally. No wheezing, crackles or rhonchi. BREASTS: Right breast exam is negative for any skin changes, no nipple discharge, no palpable masses, no palpable axillary lymphadenopathy. The left breast exam is marker for radiation changes, negative for any nipple discharge, she has a scar from her lumpectomy, no palpable masses, no palpable left axillary lymphadenopathy. CARDIOVASCULAR: Regular rate. No murmurs, rubs or gallops. ABDOMEN: Soft. Non-tender, non-distended. Positive bowel sounds. EXTREMITIES: Without clubbing, cyanosis, or edema. NEUROLOGIC: No focal deficits.    ECOG PS 1    Impression/Plan:      The patient is a pleasant 60-year-old lady, with a past medical history significant for hyperlipidemia, and hypertension, who had presented with an abnormal screening Mammogram, she was diagnosed with a left breast DCIS, she is s/p Left breast needle localized lumpectomy, the DCIS is high grade with central necrosis, final margins negative, final pathologic stage is pTis NX MX, satge 0 disease, ER positive > 90% MI positive, 90%. I discussed with the patient her diagnosis, characteristics of her DCIS, risk of disease recurrence, the increased risk of having an invasive breast cancer in the ipsilateral and contralateral breast. The patient has an ER positive disease I recommend endocrine therapy with an aromatase inhibitor, we discussed the risks and benefits of Arimidex.  I had discussed with the patient the side effects of Arimidex, including but not limited to mood changes, hot flashes, joints pain, osteoporosis, she had a DEXA scan done on 5/7/2019, healing and normal bone density. Completed adjuvant radiation therapy on 6/6/2019, she is doing well clinically. Start Arimidex, prescription was sent to her pharmacy,.calcium and vitamin D.       I discussed with her the importance of monthly breast self-examination, as well as routine screening mammograms. RTC in 1 month for toxicity check. Thank you for allowing us to participate in the care of Mrs. Staley.     Ernesto Batres MD   HEMATOLOGY/MEDICAL ONCOLOGY  [address] [place] [address] MED ONCOLOGY  [address] [place] [id number]  Dept: [phone]

## 2019-07-12 DIAGNOSIS — K21.9 GASTROESOPHAGEAL REFLUX DISEASE, ESOPHAGITIS PRESENCE NOT SPECIFIED: ICD-10-CM

## 2019-07-12 RX ORDER — OMEPRAZOLE 20 MG/1
CAPSULE, DELAYED RELEASE ORAL
Qty: 90 CAPSULE | Refills: 0 | Status: SHIPPED | OUTPATIENT
Start: 2019-07-12 | End: 2019-11-11 | Stop reason: ALTCHOICE

## 2019-07-18 DIAGNOSIS — D05.12 DUCTAL CARCINOMA IN SITU (DCIS) OF LEFT BREAST: Primary | ICD-10-CM

## 2019-07-19 ENCOUNTER — HOSPITAL ENCOUNTER (OUTPATIENT)
Dept: RADIATION ONCOLOGY | Age: 74
Discharge: HOME | End: 2019-07-19
Attending: RADIOLOGY
Payer: MEDICARE

## 2019-07-19 ENCOUNTER — OFFICE VISIT (OUTPATIENT)
Dept: ONCOLOGY | Age: 74
End: 2019-07-19
Payer: MEDICARE

## 2019-07-19 ENCOUNTER — HOSPITAL ENCOUNTER (OUTPATIENT)
Dept: INFUSION THERAPY | Age: 74
Discharge: HOME | End: 2019-07-19
Payer: MEDICARE

## 2019-07-19 VITALS
DIASTOLIC BLOOD PRESSURE: 72 MMHG | SYSTOLIC BLOOD PRESSURE: 171 MMHG | OXYGEN SATURATION: 100 % | TEMPERATURE: 97.5 F | BODY MASS INDEX: 33.37 KG/M2 | HEIGHT: 58 IN | WEIGHT: 159 LBS | HEART RATE: 76 BPM

## 2019-07-19 VITALS
SYSTOLIC BLOOD PRESSURE: 140 MMHG | HEART RATE: 68 BPM | RESPIRATION RATE: 16 BRPM | DIASTOLIC BLOOD PRESSURE: 64 MMHG | TEMPERATURE: 98.1 F

## 2019-07-19 DIAGNOSIS — D05.12 DUCTAL CARCINOMA IN SITU (DCIS) OF LEFT BREAST: Primary | ICD-10-CM

## 2019-07-19 DIAGNOSIS — D05.12 DUCTAL CARCINOMA IN SITU (DCIS) OF LEFT BREAST: ICD-10-CM

## 2019-07-19 LAB
ALBUMIN SERPL-MCNC: 4.3 G/DL (ref 3.5–5.2)
ALP BLD-CCNC: 114 U/L (ref 35–104)
ALT SERPL-CCNC: 12 U/L (ref 0–32)
ANION GAP SERPL CALCULATED.3IONS-SCNC: 10 MMOL/L (ref 7–16)
AST SERPL-CCNC: 19 U/L (ref 0–31)
BASOPHILS ABSOLUTE: 0.07 E9/L (ref 0–0.2)
BASOPHILS RELATIVE PERCENT: 1.3 % (ref 0–2)
BILIRUB SERPL-MCNC: 0.4 MG/DL (ref 0–1.2)
BUN BLDV-MCNC: 19 MG/DL (ref 8–23)
CALCIUM SERPL-MCNC: 10.4 MG/DL (ref 8.6–10.2)
CHLORIDE BLD-SCNC: 100 MMOL/L (ref 98–107)
CO2: 28 MMOL/L (ref 22–29)
CREAT SERPL-MCNC: 0.8 MG/DL (ref 0.5–1)
EOSINOPHILS ABSOLUTE: 0.15 E9/L (ref 0.05–0.5)
EOSINOPHILS RELATIVE PERCENT: 2.7 % (ref 0–6)
GFR AFRICAN AMERICAN: >60
GFR NON-AFRICAN AMERICAN: >60 ML/MIN/1.73
GLUCOSE BLD-MCNC: 105 MG/DL (ref 74–99)
HCT VFR BLD CALC: 36.6 % (ref 34–48)
HEMOGLOBIN: 11.6 G/DL (ref 11.5–15.5)
IMMATURE GRANULOCYTES #: 0.01 E9/L
IMMATURE GRANULOCYTES %: 0.2 % (ref 0–5)
LYMPHOCYTES ABSOLUTE: 1 E9/L (ref 1.5–4)
LYMPHOCYTES RELATIVE PERCENT: 17.9 % (ref 20–42)
MCH RBC QN AUTO: 26.5 PG (ref 26–35)
MCHC RBC AUTO-ENTMCNC: 31.7 % (ref 32–34.5)
MCV RBC AUTO: 83.6 FL (ref 80–99.9)
MONOCYTES ABSOLUTE: 0.72 E9/L (ref 0.1–0.95)
MONOCYTES RELATIVE PERCENT: 12.9 % (ref 2–12)
NEUTROPHILS ABSOLUTE: 3.63 E9/L (ref 1.8–7.3)
NEUTROPHILS RELATIVE PERCENT: 65 % (ref 43–80)
PDW BLD-RTO: 15.7 FL (ref 11.5–15)
PLATELET # BLD: 213 E9/L (ref 130–450)
PMV BLD AUTO: 11.9 FL (ref 7–12)
POTASSIUM SERPL-SCNC: 4.4 MMOL/L (ref 3.5–5)
RBC # BLD: 4.38 E12/L (ref 3.5–5.5)
SODIUM BLD-SCNC: 138 MMOL/L (ref 132–146)
TOTAL PROTEIN: 8.3 G/DL (ref 6.4–8.3)
WBC # BLD: 5.6 E9/L (ref 4.5–11.5)

## 2019-07-19 PROCEDURE — 36415 COLL VENOUS BLD VENIPUNCTURE: CPT | Performed by: INTERNAL MEDICINE

## 2019-07-19 PROCEDURE — 99212 OFFICE O/P EST SF 10 MIN: CPT | Performed by: INTERNAL MEDICINE

## 2019-07-19 PROCEDURE — 80053 COMPREHEN METABOLIC PANEL: CPT

## 2019-07-19 PROCEDURE — 99999 PR OFFICE/OUTPT VISIT,PROCEDURE ONLY: CPT | Performed by: NURSE PRACTITIONER

## 2019-07-19 PROCEDURE — 99214 OFFICE O/P EST MOD 30 MIN: CPT | Performed by: INTERNAL MEDICINE

## 2019-07-19 PROCEDURE — 85025 COMPLETE CBC W/AUTO DIFF WBC: CPT

## 2019-07-19 RX ORDER — ANASTROZOLE 1 MG/1
1 TABLET ORAL DAILY
Qty: 90 TABLET | Refills: 2 | Status: SHIPPED | OUTPATIENT
Start: 2019-07-19 | End: 2020-01-17 | Stop reason: SDUPTHER

## 2019-07-19 NOTE — PROGRESS NOTES
No current facility-administered medications for this encounter. Physical Examination:   Vitals:    07/19/19 0934   BP: (!) 140/64   Pulse: 68   Resp: 16   Temp: 98.1 °F (36.7 °C)       Wt Readings from Last 3 Encounters:   06/18/19 158 lb 9.6 oz (71.9 kg)   06/05/19 157 lb (71.2 kg)   06/05/19 157 lb (71.2 kg)       Alert and fully ambulatory. Pleasant and conversant. Irradiated skin intact. Scar tissue palpable and radiation changes to right breast.    HR reg, rhythm reg. Lungs CTA. ASSESSMENT/PLAN:     DCIS left breast CA, s/p BCS and adjuvant XRT to the left breast completed 6/6/19 (5256 cGy in 21 fractions)    Patient is doing well post-radiation completion. Skin care and sun care reviewed. Encouraged monthly self breast exams. Imaging per med onc/breast surgery. Cont to follow with Dr Curtis Fox for medical oncology. Started on Arimidex. Tolerating well. Notes no hot flashes, joint aches/pains, or mood changes. Next appt today. Cont to follow with [facility] for breast surgery. Next appt 10/16/19. I discussed follow up plans with Lauryn Asif. At this time, Dr Mercedes Prado will see the patient back in 3 months for a post-radiation completion follow-up visit. Lauryn Asif is to follow up with other providers involved in their care as directed (including but not limited to Medical Oncology, Primary Care, Pulmonary, and Surgery). The patient was given our contact number in the event that if at any time they change their mind and would like to return to the clinic to see either myself or one of the Radiation Oncologists, they can simply call us and we would be happy to see them sooner. Thank you for involving us in the management of this extremely pleasant patient. More than 15 min was in direct contact with pt coordinating/giving care. >50% of the visit was spent in counseling the pt on the following:   Follow up care    The nurses

## 2019-07-26 DIAGNOSIS — D05.12 DUCTAL CARCINOMA IN SITU (DCIS) OF LEFT BREAST: Primary | ICD-10-CM

## 2019-07-29 DIAGNOSIS — I10 ESSENTIAL HYPERTENSION: ICD-10-CM

## 2019-07-29 RX ORDER — LOSARTAN POTASSIUM 25 MG/1
25 TABLET ORAL DAILY
Qty: 90 TABLET | Refills: 1 | Status: SHIPPED | OUTPATIENT
Start: 2019-07-29 | End: 2020-01-07

## 2019-07-29 RX ORDER — CHLORTHALIDONE 25 MG/1
25 TABLET ORAL DAILY
Qty: 90 TABLET | Refills: 1 | Status: SHIPPED | OUTPATIENT
Start: 2019-07-29 | End: 2020-01-10

## 2019-08-06 NOTE — PROGRESS NOTES
release capsule, TAKE 1 CAPSULE BY MOUTH EVERY MORNING BEFORE BREAKFAST, Disp: 90 capsule, Rfl: 0    calcium carbonate-vitamin D (CALCIUM 600 + D) 600-400 MG-UNIT TABS per tab, Take 1 tablet by mouth 2 times daily, Disp: 60 tablet, Rfl: 1    potassium chloride (KLOR-CON M) 20 MEQ extended release tablet, Take 2 tablets by mouth daily, Disp: 60 tablet, Rfl: 3    atorvastatin (LIPITOR) 40 MG tablet, Take 1 tablet by mouth nightly, Disp: 30 tablet, Rfl: 3    Multiple Vitamins-Minerals (EYE VITAMINS) CAPS, Take by mouth 2 times daily, Disp: , Rfl:     zoster recombinant adjuvanted vaccine (SHINGRIX) 50 MCG/0.5ML SUSR injection, Inject 0.5 mLs into the muscle See Admin Instructions 1 dose now and repeat in 2-6 months, Disp: 0.5 mL, Rfl: 0    ranitidine (ZANTAC) 150 MG tablet, Take 1 tablet by mouth as needed for Heartburn (Patient not taking: Reported on 8/8/2019), Disp: 60 tablet, Rfl: 1    Allergies   Allergen Reactions    Poultry Meal Hives    Hydrocodone-Acetaminophen Rash    Molds & Smuts Other (See Comments)    Pcn [Penicillins] Rash       Family History   Problem Relation Age of Onset    Parkinsonism Mother     Heart Attack Father     Cancer Father 80        leukemia    Cancer Maternal Aunt         Breast--60 or 76s    Cancer Maternal Grandfather         unknown    Cancer Paternal Cousin 16        leukemia       Past Surgical History:   Procedure Laterality Date    BREAST BIOPSY Left 2/27/2019    LEFT BREAST NEEDLE LOCALIZED  LUMPECTOMY  WITH BIOZORB  PLACEMENT --TRIDENT performed by Meghan Morelos MD at . [facility] LUMPECTOMY Left 3/26/2019    LEFT BREAST LUMPECTOMY RE-EXCISION ANTERIOR LATERAL SUPERIOR & POSTERIOR MARGINS , REPLACEMENT OF BIOZORB performed by Meghan Morelos MD at [facility]       Social History     Tobacco Use    Smoking status: Never Smoker    Smokeless tobacco: Never Used Substance Use Topics    Alcohol use: Yes     Alcohol/week: 1.0 standard drinks     Types: 1 Glasses of wine per week     Frequency: Monthly or less     Drinks per session: 1 or 2     Binge frequency: Never     Comment: occasionally    Drug use: No       BP (!) 140/66   Pulse 88   Resp 16   Ht 4' 10\" (1.473 m)   Wt 159 lb (72.1 kg)   LMP  (LMP Unknown)   SpO2 98%   Breastfeeding? No   BMI 33.23 kg/m²     Review of Systems   Constitutional: Negative for chills and fever. Respiratory: Negative for cough and shortness of breath. Cardiovascular: Negative for chest pain and palpitations. Gastrointestinal: Negative for abdominal pain, constipation, diarrhea, nausea and vomiting. Genitourinary: Negative for dysuria, frequency and hematuria. Skin: Negative for rash. Neurological: Negative for dizziness, light-headedness and headaches. Physical Exam   Constitutional: She is oriented to person, place, and time. She appears well-developed and well-nourished. No distress. HENT:   Head: Normocephalic and atraumatic. Mouth/Throat: Oropharynx is clear and moist.   Eyes: Conjunctivae are normal.   Neck: Neck supple. Cardiovascular: Normal rate, regular rhythm and normal heart sounds. Exam reveals no gallop and no friction rub. No murmur heard. Pulmonary/Chest: Effort normal and breath sounds normal. No respiratory distress. She has no wheezes. She has no rales. Abdominal: Soft. She exhibits no distension. There is no tenderness. There is no rebound and no guarding. Musculoskeletal: She exhibits no edema. Neurological: She is alert and oriented to person, place, and time. Skin: Skin is warm. She is not diaphoretic. Psychiatric: She has a normal mood and affect. Her behavior is normal.   Vitals reviewed. ASSESSMENT      Diagnosis Orders   1. Gastroesophageal reflux disease, esophagitis presence not specified     2. Essential hypertension     3.  Need for shingles vaccine  zoster symptoms develop prior to next visit.        Pedro Luis Wilson M.D  Family Medicine Resident PGY2  [facility] Family Medicine Res[facility]   8/8/19

## 2019-08-08 ENCOUNTER — OFFICE VISIT (OUTPATIENT)
Dept: FAMILY MEDICINE CLINIC | Age: 74
End: 2019-08-08
Payer: MEDICARE

## 2019-08-08 ENCOUNTER — HOSPITAL ENCOUNTER (OUTPATIENT)
Age: 74
Discharge: HOME | End: 2019-08-08
Payer: MEDICARE

## 2019-08-08 VITALS
SYSTOLIC BLOOD PRESSURE: 140 MMHG | HEART RATE: 88 BPM | HEIGHT: 58 IN | DIASTOLIC BLOOD PRESSURE: 66 MMHG | BODY MASS INDEX: 33.37 KG/M2 | WEIGHT: 159 LBS | RESPIRATION RATE: 16 BRPM | OXYGEN SATURATION: 98 %

## 2019-08-08 DIAGNOSIS — Z23 NEED FOR SHINGLES VACCINE: ICD-10-CM

## 2019-08-08 DIAGNOSIS — K21.9 GASTROESOPHAGEAL REFLUX DISEASE, ESOPHAGITIS PRESENCE NOT SPECIFIED: Primary | ICD-10-CM

## 2019-08-08 DIAGNOSIS — I10 ESSENTIAL HYPERTENSION: ICD-10-CM

## 2019-08-08 DIAGNOSIS — D05.12 DUCTAL CARCINOMA IN SITU (DCIS) OF LEFT BREAST: ICD-10-CM

## 2019-08-08 LAB
ANION GAP SERPL CALCULATED.3IONS-SCNC: 12 MMOL/L (ref 7–16)
BUN BLDV-MCNC: 16 MG/DL (ref 8–23)
CALCIUM SERPL-MCNC: 9.8 MG/DL (ref 8.6–10.2)
CHLORIDE BLD-SCNC: 100 MMOL/L (ref 98–107)
CO2: 26 MMOL/L (ref 22–29)
CREAT SERPL-MCNC: 0.8 MG/DL (ref 0.5–1)
GFR AFRICAN AMERICAN: >60
GFR NON-AFRICAN AMERICAN: >60 ML/MIN/1.73
GLUCOSE BLD-MCNC: 97 MG/DL (ref 74–99)
POTASSIUM SERPL-SCNC: 4 MMOL/L (ref 3.5–5)
SODIUM BLD-SCNC: 138 MMOL/L (ref 132–146)

## 2019-08-08 PROCEDURE — 80048 BASIC METABOLIC PNL TOTAL CA: CPT

## 2019-08-08 PROCEDURE — 36415 COLL VENOUS BLD VENIPUNCTURE: CPT

## 2019-08-08 PROCEDURE — 99213 OFFICE O/P EST LOW 20 MIN: CPT | Performed by: FAMILY MEDICINE

## 2019-08-08 RX ORDER — CETIRIZINE HYDROCHLORIDE 10 MG/1
10 TABLET ORAL DAILY
COMMUNITY
End: 2021-06-20 | Stop reason: ALTCHOICE

## 2019-08-08 RX ORDER — OMEGA-3 FATTY ACIDS/FISH OIL 300-1000MG
1 CAPSULE ORAL PRN
COMMUNITY

## 2019-08-08 SDOH — HEALTH STABILITY: MENTAL HEALTH: HOW OFTEN DO YOU HAVE A DRINK CONTAINING ALCOHOL?: MONTHLY OR LESS

## 2019-08-08 SDOH — HEALTH STABILITY: MENTAL HEALTH: HOW MANY STANDARD DRINKS CONTAINING ALCOHOL DO YOU HAVE ON A TYPICAL DAY?: 1 OR 2

## 2019-08-08 ASSESSMENT — ENCOUNTER SYMPTOMS
NAUSEA: 0
VOMITING: 0
DIARRHEA: 0
CONSTIPATION: 0
ABDOMINAL PAIN: 0
COUGH: 0
SHORTNESS OF BREATH: 0

## 2019-08-20 ENCOUNTER — TELEPHONE (OUTPATIENT)
Dept: ONCOLOGY | Age: 74
End: 2019-08-20

## 2019-08-22 DIAGNOSIS — D05.12 DUCTAL CARCINOMA IN SITU (DCIS) OF LEFT BREAST: Primary | ICD-10-CM

## 2019-10-02 DIAGNOSIS — I10 ESSENTIAL HYPERTENSION: ICD-10-CM

## 2019-10-03 RX ORDER — POTASSIUM CHLORIDE 20 MEQ/1
40 TABLET, EXTENDED RELEASE ORAL DAILY
Qty: 60 TABLET | Refills: 0 | Status: SHIPPED | OUTPATIENT
Start: 2019-10-03 | End: 2019-10-29 | Stop reason: SDUPTHER

## 2019-10-18 ENCOUNTER — OFFICE VISIT (OUTPATIENT)
Dept: ONCOLOGY | Age: 74
End: 2019-10-18
Payer: MEDICARE

## 2019-10-18 ENCOUNTER — HOSPITAL ENCOUNTER (OUTPATIENT)
Dept: INFUSION THERAPY | Age: 74
Discharge: HOME | End: 2019-10-18
Payer: MEDICARE

## 2019-10-18 VITALS
TEMPERATURE: 98.5 F | WEIGHT: 160.1 LBS | SYSTOLIC BLOOD PRESSURE: 180 MMHG | DIASTOLIC BLOOD PRESSURE: 75 MMHG | HEIGHT: 58 IN | BODY MASS INDEX: 33.61 KG/M2 | HEART RATE: 90 BPM

## 2019-10-18 DIAGNOSIS — D05.12 DUCTAL CARCINOMA IN SITU (DCIS) OF LEFT BREAST: Primary | ICD-10-CM

## 2019-10-18 DIAGNOSIS — D05.12 DUCTAL CARCINOMA IN SITU (DCIS) OF LEFT BREAST: ICD-10-CM

## 2019-10-18 LAB
ALBUMIN SERPL-MCNC: 4.5 G/DL (ref 3.5–5.2)
ALP BLD-CCNC: 114 U/L (ref 35–104)
ALT SERPL-CCNC: 12 U/L (ref 0–32)
ANION GAP SERPL CALCULATED.3IONS-SCNC: 9 MMOL/L (ref 7–16)
AST SERPL-CCNC: 20 U/L (ref 0–31)
BASOPHILS ABSOLUTE: 0.07 E9/L (ref 0–0.2)
BASOPHILS RELATIVE PERCENT: 1.4 % (ref 0–2)
BILIRUB SERPL-MCNC: 0.4 MG/DL (ref 0–1.2)
BUN BLDV-MCNC: 20 MG/DL (ref 8–23)
CALCIUM SERPL-MCNC: 10.4 MG/DL (ref 8.6–10.2)
CHLORIDE BLD-SCNC: 97 MMOL/L (ref 98–107)
CO2: 30 MMOL/L (ref 22–29)
CREAT SERPL-MCNC: 0.8 MG/DL (ref 0.5–1)
EOSINOPHILS ABSOLUTE: 0.11 E9/L (ref 0.05–0.5)
EOSINOPHILS RELATIVE PERCENT: 2.1 % (ref 0–6)
GFR AFRICAN AMERICAN: >60
GFR NON-AFRICAN AMERICAN: >60 ML/MIN/1.73
GLUCOSE BLD-MCNC: 109 MG/DL (ref 74–99)
HCT VFR BLD CALC: 36.8 % (ref 34–48)
HEMOGLOBIN: 11.9 G/DL (ref 11.5–15.5)
IMMATURE GRANULOCYTES #: 0.02 E9/L
IMMATURE GRANULOCYTES %: 0.4 % (ref 0–5)
LYMPHOCYTES ABSOLUTE: 1.14 E9/L (ref 1.5–4)
LYMPHOCYTES RELATIVE PERCENT: 22.2 % (ref 20–42)
MCH RBC QN AUTO: 27.5 PG (ref 26–35)
MCHC RBC AUTO-ENTMCNC: 32.3 % (ref 32–34.5)
MCV RBC AUTO: 85 FL (ref 80–99.9)
MONOCYTES ABSOLUTE: 0.6 E9/L (ref 0.1–0.95)
MONOCYTES RELATIVE PERCENT: 11.7 % (ref 2–12)
NEUTROPHILS ABSOLUTE: 3.2 E9/L (ref 1.8–7.3)
NEUTROPHILS RELATIVE PERCENT: 62.2 % (ref 43–80)
PDW BLD-RTO: 14.8 FL (ref 11.5–15)
PLATELET # BLD: 214 E9/L (ref 130–450)
PMV BLD AUTO: 11.4 FL (ref 7–12)
POTASSIUM SERPL-SCNC: 3.8 MMOL/L (ref 3.5–5)
RBC # BLD: 4.33 E12/L (ref 3.5–5.5)
SODIUM BLD-SCNC: 136 MMOL/L (ref 132–146)
TOTAL PROTEIN: 8.3 G/DL (ref 6.4–8.3)
WBC # BLD: 5.1 E9/L (ref 4.5–11.5)

## 2019-10-18 PROCEDURE — 80053 COMPREHEN METABOLIC PANEL: CPT

## 2019-10-18 PROCEDURE — 85025 COMPLETE CBC W/AUTO DIFF WBC: CPT

## 2019-10-18 PROCEDURE — 99214 OFFICE O/P EST MOD 30 MIN: CPT | Performed by: INTERNAL MEDICINE

## 2019-10-18 PROCEDURE — 36415 COLL VENOUS BLD VENIPUNCTURE: CPT | Performed by: INTERNAL MEDICINE

## 2019-10-18 PROCEDURE — 99212 OFFICE O/P EST SF 10 MIN: CPT | Performed by: INTERNAL MEDICINE

## 2019-10-18 ASSESSMENT — ENCOUNTER SYMPTOMS
ABDOMINAL PAIN: 0
SORE THROAT: 0
BLOOD IN STOOL: 0
VOICE CHANGE: 0
WHEEZING: 0
VOMITING: 0
SHORTNESS OF BREATH: 0
SINUS PAIN: 0
COUGH: 0
ABDOMINAL DISTENTION: 0
BACK PAIN: 0
CONSTIPATION: 0
DIARRHEA: 0
CHOKING: 0
CHEST TIGHTNESS: 0
EYE ITCHING: 0
RHINORRHEA: 0
TROUBLE SWALLOWING: 0
SINUS PRESSURE: 0
NAUSEA: 0
EYE DISCHARGE: 0

## 2019-10-24 ENCOUNTER — HOSPITAL ENCOUNTER (OUTPATIENT)
Dept: RADIATION ONCOLOGY | Age: 74
Discharge: HOME | End: 2019-10-24
Attending: RADIOLOGY
Payer: MEDICARE

## 2019-10-24 VITALS
WEIGHT: 161.44 LBS | TEMPERATURE: 98.4 F | RESPIRATION RATE: 20 BRPM | BODY MASS INDEX: 33.74 KG/M2 | DIASTOLIC BLOOD PRESSURE: 76 MMHG | OXYGEN SATURATION: 97 % | HEART RATE: 96 BPM | SYSTOLIC BLOOD PRESSURE: 150 MMHG

## 2019-10-24 DIAGNOSIS — C50.919 MALIGNANT NEOPLASM OF FEMALE BREAST, UNSPECIFIED ESTROGEN RECEPTOR STATUS, UNSPECIFIED LATERALITY, UNSPECIFIED SITE OF BREAST (HCC): Primary | ICD-10-CM

## 2019-10-24 PROCEDURE — 99214 OFFICE O/P EST MOD 30 MIN: CPT | Performed by: RADIOLOGY

## 2019-10-24 PROCEDURE — 99212 OFFICE O/P EST SF 10 MIN: CPT

## 2019-10-29 ENCOUNTER — TELEPHONE (OUTPATIENT)
Dept: RADIATION ONCOLOGY | Age: 74
End: 2019-10-29

## 2019-10-29 DIAGNOSIS — I10 ESSENTIAL HYPERTENSION: ICD-10-CM

## 2019-10-30 RX ORDER — POTASSIUM CHLORIDE 20 MEQ/1
40 TABLET, EXTENDED RELEASE ORAL DAILY
Qty: 60 TABLET | Refills: 5 | Status: SHIPPED | OUTPATIENT
Start: 2019-10-30 | End: 2019-12-02 | Stop reason: ALTCHOICE

## 2019-11-11 ENCOUNTER — OFFICE VISIT (OUTPATIENT)
Dept: FAMILY MEDICINE CLINIC | Age: 74
End: 2019-11-11
Payer: MEDICARE

## 2019-11-11 VITALS
DIASTOLIC BLOOD PRESSURE: 73 MMHG | WEIGHT: 160 LBS | BODY MASS INDEX: 33.58 KG/M2 | HEIGHT: 58 IN | RESPIRATION RATE: 16 BRPM | OXYGEN SATURATION: 98 % | SYSTOLIC BLOOD PRESSURE: 159 MMHG | HEART RATE: 90 BPM

## 2019-11-11 DIAGNOSIS — I10 ESSENTIAL HYPERTENSION: ICD-10-CM

## 2019-11-11 DIAGNOSIS — K21.9 GASTROESOPHAGEAL REFLUX DISEASE, ESOPHAGITIS PRESENCE NOT SPECIFIED: Primary | ICD-10-CM

## 2019-11-11 PROCEDURE — 99213 OFFICE O/P EST LOW 20 MIN: CPT | Performed by: FAMILY MEDICINE

## 2019-11-11 RX ORDER — OMEPRAZOLE 20 MG/1
CAPSULE, DELAYED RELEASE ORAL
Qty: 90 CAPSULE | Refills: 0 | Status: CANCELLED | OUTPATIENT
Start: 2019-11-11

## 2019-11-11 RX ORDER — ATORVASTATIN CALCIUM 40 MG/1
40 TABLET, FILM COATED ORAL NIGHTLY
Qty: 90 TABLET | Refills: 0 | Status: CANCELLED | OUTPATIENT
Start: 2019-11-11

## 2019-11-11 ASSESSMENT — ENCOUNTER SYMPTOMS
CONSTIPATION: 0
DIARRHEA: 0
VOMITING: 0
PHOTOPHOBIA: 0
SHORTNESS OF BREATH: 0
COUGH: 0
ABDOMINAL PAIN: 0
NAUSEA: 0

## 2019-11-28 DIAGNOSIS — E78.2 MIXED HYPERLIPIDEMIA: ICD-10-CM

## 2019-11-29 RX ORDER — ATORVASTATIN CALCIUM 40 MG/1
TABLET, FILM COATED ORAL
Qty: 30 TABLET | Refills: 0 | Status: SHIPPED | OUTPATIENT
Start: 2019-11-29 | End: 2020-01-03

## 2019-11-30 DIAGNOSIS — I10 ESSENTIAL HYPERTENSION: ICD-10-CM

## 2019-12-02 RX ORDER — POTASSIUM CHLORIDE 20 MEQ/1
TABLET, EXTENDED RELEASE ORAL
Qty: 60 TABLET | Refills: 0 | Status: SHIPPED
Start: 2019-12-02 | End: 2020-06-03

## 2019-12-04 ENCOUNTER — NURSE ONLY (OUTPATIENT)
Dept: FAMILY MEDICINE CLINIC | Age: 74
End: 2019-12-04
Payer: MEDICARE

## 2019-12-04 DIAGNOSIS — Z23 NEED FOR INFLUENZA VACCINATION: Primary | ICD-10-CM

## 2019-12-04 PROCEDURE — G0008 ADMIN INFLUENZA VIRUS VAC: HCPCS | Performed by: FAMILY MEDICINE

## 2019-12-04 PROCEDURE — 90653 IIV ADJUVANT VACCINE IM: CPT | Performed by: FAMILY MEDICINE

## 2019-12-13 ENCOUNTER — OFFICE VISIT (OUTPATIENT)
Dept: BREAST CENTER | Age: 74
End: 2019-12-13
Payer: MEDICARE

## 2019-12-13 ENCOUNTER — HOSPITAL ENCOUNTER (OUTPATIENT)
Dept: GENERAL RADIOLOGY | Age: 74
Discharge: HOME | End: 2019-12-15
Payer: MEDICARE

## 2019-12-13 VITALS
HEIGHT: 58 IN | BODY MASS INDEX: 33.8 KG/M2 | DIASTOLIC BLOOD PRESSURE: 78 MMHG | OXYGEN SATURATION: 98 % | HEART RATE: 83 BPM | SYSTOLIC BLOOD PRESSURE: 128 MMHG | WEIGHT: 161 LBS

## 2019-12-13 DIAGNOSIS — Z78.0 POSTMENOPAUSAL: ICD-10-CM

## 2019-12-13 DIAGNOSIS — Z12.31 SCREENING MAMMOGRAM, ENCOUNTER FOR: ICD-10-CM

## 2019-12-13 DIAGNOSIS — Z12.31 SCREENING MAMMOGRAM, ENCOUNTER FOR: Primary | ICD-10-CM

## 2019-12-13 DIAGNOSIS — Z85.3 PERSONAL HISTORY OF BREAST CANCER: Primary | ICD-10-CM

## 2019-12-13 PROCEDURE — 99213 OFFICE O/P EST LOW 20 MIN: CPT | Performed by: NURSE PRACTITIONER

## 2019-12-13 PROCEDURE — 77063 BREAST TOMOSYNTHESIS BI: CPT

## 2019-12-19 DIAGNOSIS — D05.12 DUCTAL CARCINOMA IN SITU (DCIS) OF LEFT BREAST: ICD-10-CM

## 2020-01-03 ENCOUNTER — TELEPHONE (OUTPATIENT)
Dept: CASE MANAGEMENT | Age: 75
End: 2020-01-03

## 2020-01-03 RX ORDER — ATORVASTATIN CALCIUM 40 MG/1
TABLET, FILM COATED ORAL
Qty: 30 TABLET | Refills: 3 | Status: SHIPPED | OUTPATIENT
Start: 2020-01-03 | End: 2020-01-10

## 2020-01-03 NOTE — TELEPHONE ENCOUNTER
Requested Prescriptions     Pending Prescriptions Disp Refills    atorvastatin (LIPITOR) 40 MG tablet [Pharmacy Med Name: ATORVASTATIN 40MG TABLETS] 30 tablet 3     Sig: TAKE 1 TABLET BY MOUTH EVERY NIGHT

## 2020-01-03 NOTE — TELEPHONE ENCOUNTER
Survivorship Care plan and cancer treatment summary prepared for patient's scheduled appointment in medical onlocolgy on 1/17/20. Survivorship care plan and cancer treatment summary faxed to patient PCP.

## 2020-01-07 RX ORDER — LOSARTAN POTASSIUM 25 MG/1
25 TABLET ORAL DAILY
Qty: 90 TABLET | Refills: 1 | Status: SHIPPED
Start: 2020-01-07 | End: 2020-07-13 | Stop reason: SDUPTHER

## 2020-01-10 RX ORDER — ATORVASTATIN CALCIUM 40 MG/1
TABLET, FILM COATED ORAL
Qty: 30 TABLET | Refills: 3 | Status: SHIPPED
Start: 2020-01-10 | End: 2020-06-24 | Stop reason: SDUPTHER

## 2020-01-10 RX ORDER — CHLORTHALIDONE 25 MG/1
25 TABLET ORAL DAILY
Qty: 90 TABLET | Refills: 1 | Status: SHIPPED
Start: 2020-01-10 | End: 2020-07-21

## 2020-01-17 ENCOUNTER — HOSPITAL ENCOUNTER (OUTPATIENT)
Dept: INFUSION THERAPY | Age: 75
Discharge: HOME | End: 2020-01-17
Payer: MEDICARE

## 2020-01-17 ENCOUNTER — OFFICE VISIT (OUTPATIENT)
Dept: ONCOLOGY | Age: 75
End: 2020-01-17
Payer: MEDICARE

## 2020-01-17 VITALS
OXYGEN SATURATION: 100 % | SYSTOLIC BLOOD PRESSURE: 175 MMHG | HEART RATE: 78 BPM | WEIGHT: 160.6 LBS | BODY MASS INDEX: 33.71 KG/M2 | DIASTOLIC BLOOD PRESSURE: 73 MMHG | TEMPERATURE: 98.6 F | HEIGHT: 58 IN

## 2020-01-17 DIAGNOSIS — D05.12 DUCTAL CARCINOMA IN SITU (DCIS) OF LEFT BREAST: ICD-10-CM

## 2020-01-17 LAB
ALBUMIN SERPL-MCNC: 4.2 G/DL (ref 3.5–5.2)
ALP BLD-CCNC: 116 U/L (ref 35–104)
ALT SERPL-CCNC: 14 U/L (ref 0–32)
ANION GAP SERPL CALCULATED.3IONS-SCNC: 13 MMOL/L (ref 7–16)
AST SERPL-CCNC: 23 U/L (ref 0–31)
BASOPHILS ABSOLUTE: 0.06 E9/L (ref 0–0.2)
BASOPHILS RELATIVE PERCENT: 1 % (ref 0–2)
BILIRUB SERPL-MCNC: 0.4 MG/DL (ref 0–1.2)
BUN BLDV-MCNC: 16 MG/DL (ref 8–23)
CALCIUM SERPL-MCNC: 10.6 MG/DL (ref 8.6–10.2)
CHLORIDE BLD-SCNC: 99 MMOL/L (ref 98–107)
CO2: 27 MMOL/L (ref 22–29)
CREAT SERPL-MCNC: 0.8 MG/DL (ref 0.5–1)
EOSINOPHILS ABSOLUTE: 0.17 E9/L (ref 0.05–0.5)
EOSINOPHILS RELATIVE PERCENT: 2.8 % (ref 0–6)
GFR AFRICAN AMERICAN: >60
GFR NON-AFRICAN AMERICAN: >60 ML/MIN/1.73
GLUCOSE BLD-MCNC: 95 MG/DL (ref 74–99)
HCT VFR BLD CALC: 36.7 % (ref 34–48)
HEMOGLOBIN: 11.5 G/DL (ref 11.5–15.5)
IMMATURE GRANULOCYTES #: 0.02 E9/L
IMMATURE GRANULOCYTES %: 0.3 % (ref 0–5)
LYMPHOCYTES ABSOLUTE: 1.52 E9/L (ref 1.5–4)
LYMPHOCYTES RELATIVE PERCENT: 24.7 % (ref 20–42)
MCH RBC QN AUTO: 26.7 PG (ref 26–35)
MCHC RBC AUTO-ENTMCNC: 31.3 % (ref 32–34.5)
MCV RBC AUTO: 85.2 FL (ref 80–99.9)
MONOCYTES ABSOLUTE: 0.72 E9/L (ref 0.1–0.95)
MONOCYTES RELATIVE PERCENT: 11.7 % (ref 2–12)
NEUTROPHILS ABSOLUTE: 3.67 E9/L (ref 1.8–7.3)
NEUTROPHILS RELATIVE PERCENT: 59.5 % (ref 43–80)
PDW BLD-RTO: 14.6 FL (ref 11.5–15)
PLATELET # BLD: 220 E9/L (ref 130–450)
PMV BLD AUTO: 12.2 FL (ref 7–12)
POTASSIUM SERPL-SCNC: 3.7 MMOL/L (ref 3.5–5)
RBC # BLD: 4.31 E12/L (ref 3.5–5.5)
SODIUM BLD-SCNC: 139 MMOL/L (ref 132–146)
TOTAL PROTEIN: 8.7 G/DL (ref 6.4–8.3)
WBC # BLD: 6.2 E9/L (ref 4.5–11.5)

## 2020-01-17 PROCEDURE — 99214 OFFICE O/P EST MOD 30 MIN: CPT | Performed by: INTERNAL MEDICINE

## 2020-01-17 PROCEDURE — 85025 COMPLETE CBC W/AUTO DIFF WBC: CPT

## 2020-01-17 PROCEDURE — 36415 COLL VENOUS BLD VENIPUNCTURE: CPT | Performed by: INTERNAL MEDICINE

## 2020-01-17 PROCEDURE — 80053 COMPREHEN METABOLIC PANEL: CPT

## 2020-01-17 PROCEDURE — 99212 OFFICE O/P EST SF 10 MIN: CPT | Performed by: INTERNAL MEDICINE

## 2020-01-17 RX ORDER — ANASTROZOLE 1 MG/1
1 TABLET ORAL DAILY
Qty: 90 TABLET | Refills: 2 | Status: SHIPPED
Start: 2020-01-17 | End: 2020-04-21 | Stop reason: SDUPTHER

## 2020-01-17 NOTE — PROGRESS NOTES
[facility] ONCOLOG[facility]  [address] [place] [id number]  Dept: [phone]  Attending Progress note      Reason for Visit:   Left Breast DCIS. Referring Physician:  Andi Valdez MD.    PCP:  Jalyn Hough MD    History of Present Illness: The patient is a pleasant 76 y.o. lady, with a past medical history significant for hyperlipidemia, and hypertension, who had presented with an abnormal screening Mammogram:  MAMMOGRAM (12/11/2018): INDICATION:   Screening.       HISTORY:   The patient has no personal history of cancer. The patient has the following family history of breast cancer:  maternal aunt.       MAMMOGRAM VIEWS:   The following mammographic views where obtained: bilateral craniocaudal; bilateral craniocaudal with tomosynthesis; bilateral mediolateral oblique; and bilateral mediolateral oblique with tomosynthesis       TOMOSYNTHESIS:   Tomosynthesis (3 Dimensional Breast Imaging) was used on this examination to aid in evaluation.       COMPARISON:   No prior imaging studies are available for comparison.       CAD:   This exam was reviewed using the [employer] Computer Aided Detection (CAD)       TISSUE DENSITY:   The breasts are heterogeneously dense (Type 3 density).       MAMMOGRAM FINDINGS:   In the right breast, no suspicious masses, areas of suspicious architectural distortion, suspicious calcifications, or additional suspicious findings are identified.           Finding 1:   There are coarse heterogeneous and fine pleomorphic calcifications measuring 17 mm seen in the upper outer quadrant of the left breast.       IMPRESSION:   Calcifications in the left breast require additional evaluation.    Spot magnification views with consideration for a stereotactic biopsy if needed.       =======================================   BI-RADS Category 0:  Incomplete: Need Additional Imaging Evaluation   =======================================     [facility] 2019    breast    Hyperlipidemia     Hypertension     Pneumonia 1953    Shingles      Patient Active Problem List   Diagnosis    Herpes zoster without complication    Essential hypertension    Ductal carcinoma in situ (DCIS) of left breast    Gastroesophageal reflux disease        Past Surgical History:      Procedure Laterality Date    BREAST BIOPSY Left 2/27/2019    LEFT BREAST NEEDLE LOCALIZED  LUMPECTOMY  WITH BIOZORB  PLACEMENT --TRIDENT performed by Vinay Cantor MD at [facility] 55 LUMPECTOMY Left 3/26/2019    LEFT BREAST LUMPECTOMY RE-EXCISION ANTERIOR LATERAL SUPERIOR & POSTERIOR MARGINS , REPLACEMENT OF BIOZORB performed by Vinay Cantor MD at [facility] 59       Family History:  Family History   Problem Relation Age of Onset   Aguirre Parkinsonism Mother     Heart Attack Father     Cancer Father 80        leukemia    Cancer Maternal Aunt         Breast--60 or 76s    Cancer Maternal Grandfather         unknown    Cancer Paternal Cousin 16        leukemia       Medications:  Reviewed and reconciled. Social History:  Social History     Socioeconomic History    Marital status: Single     Spouse name: Not on file    Number of children: Not on file    Years of education: Not on file    Highest education level: Not on file   Occupational History    Not on file   Social Needs    Financial resource strain: Not on file    Food insecurity:     Worry: Not on file     Inability: Not on file    Transportation needs:     Medical: Not on file     Non-medical: Not on file   Tobacco Use    Smoking status: Never Smoker    Smokeless tobacco: Never Used   Substance and Sexual Activity    Alcohol use: Yes     Alcohol/week: 1.0 standard drinks     Types: 1 Glasses of wine per week     Frequency: Monthly or less     Drinks per session: 1 or 2     Binge frequency: Never     Comment: occasionally    Drug use:  No  Sexual activity: Not Currently   Lifestyle    Physical activity:     Days per week: Not on file     Minutes per session: Not on file    Stress: Not on file   Relationships    Social connections:     Talks on phone: Not on file     Gets together: Not on file     Attends Orthodox service: Not on file     Active member of club or organization: Not on file     Attends meetings of clubs or organizations: Not on file     Relationship status: Not on file    Intimate partner violence:     Fear of current or ex partner: Not on file     Emotionally abused: Not on file     Physically abused: Not on file     Forced sexual activity: Not on file   Other Topics Concern    Not on file   Social History Narrative    Flory Munoz lives in [place] alone - retired from [employer]. Allergies: Allergies   Allergen Reactions    Poultry Meal Hives    Hydrocodone-Acetaminophen Rash    Molds & Smuts Other (See Comments)    Pcn [Penicillins] Rash     OB/GYN:  Age of menarche was 8. Age of menopause was 61. Last menstrual period was age 61. Patient denies hormonal therapy. Patient is     Physical Exam:  BP (!) 175/73 (Site: Right Upper Arm, Position: Sitting, Cuff Size: Medium Adult)   Pulse 78   Temp 98.6 °F (37 °C) (Temporal)   Ht 4' 10\" (1.473 m)   Wt 160 lb 9.6 oz (72.8 kg)   LMP  (LMP Unknown)   SpO2 100%   BMI 33.57 kg/m²     GENERAL: Alert, oriented x 3, not in acute distress. HEENT: PERRLA; EOMI. Oropharynx clear. NECK: Supple. No palpable cervical or supraclavicular lymphadenopathy. LUNGS: Good air entry bilaterally. No wheezing, crackles or rhonchi. BREASTS: Right breast exam is negative for any skin changes, no nipple discharge, no palpable masses, no palpable axillary lymphadenopathy. The left breast exam is negative for any nipple discharge, she has a scar from her lumpectomy, no palpable masses, no palpable left axillary lymphadenopathy. CARDIOVASCULAR: Regular rate. No murmurs, rubs or gallops. Rebeka.     Micheal Pineda MD   HEMATOLOGY/MEDICAL ONCOLOGY  [address] [place] [address] ONCOLOGY  [facility]  [address] [facility] [id number]  Dept: [phone]

## 2020-01-20 ENCOUNTER — TELEPHONE (OUTPATIENT)
Dept: CASE MANAGEMENT | Age: 75
End: 2020-01-20

## 2020-01-20 NOTE — LETTER
[facility] Nurse Navigator      Herminio Siu RN        January 20, 2020    [address] [place] [id number]      Dear Sukhi Finders:    Brian Holden you find a copy of your survivorship care plan and cancer treatment summary, along with information to help keep you on track for monitoring your self breast exams and lymphedema. You will find in this packet local support groups and local physical rehabilitation programs for your use. I have included my contact information, please feel free to call to review your survivorship care plan in it's entirety. If you have any questions or concerns, please don't hesitate to call [phone].     Sincerely,        Herminio Siu RN

## 2020-01-20 NOTE — TELEPHONE ENCOUNTER
Missed opportunity to meet with patient at visit scheduled on 1/17/20 with medical oncology. Patient encouraged to sufficiently review the details of her Survivorship Care Plan and Clinical treatment summary that were mailed to the patient. I also provided patient a copy of [facility] life after treatment, Maya's sisters, [facility] program, [facility]hedema, and my business card. Patient encouraged to call with questions regarding their survivorship care plan/Clinical treatment summary. A copy was sent to patient's PCP, Dr. Phuong Castillo. I will discharge patient from a navigation standpoint.

## 2020-04-17 ENCOUNTER — HOSPITAL ENCOUNTER (OUTPATIENT)
Dept: INFUSION THERAPY | Age: 75
End: 2020-04-17
Payer: MEDICARE

## 2020-04-21 ENCOUNTER — VIRTUAL VISIT (OUTPATIENT)
Dept: ONCOLOGY | Age: 75
End: 2020-04-21
Payer: MEDICARE

## 2020-04-21 PROCEDURE — 99441 PR PHYS/QHP TELEPHONE EVALUATION 5-10 MIN: CPT | Performed by: INTERNAL MEDICINE

## 2020-04-23 NOTE — PROGRESS NOTES
views with consideration for a stereotactic biopsy if needed.       =======================================   BI-RADS Category 0:  Incomplete: Need Additional Imaging Evaluation   =======================================       RISK ASSESSMENT:   During this patient's visit, information obtained was used to generate a lifetime risk assessment using the Tyrer-Cuzick model (also called the BRENNON [International Breast Cancer Intervention Study] Breast Cancer Risk Evaluation Tool).     - LIFETIME RISK -   Patient has a Tyrer Cuzick score of 7.9%   - BREAST TISSUE DENSITY -   Heterogeneously dense       -AVERAGE RISK (<15% )   Yearly screening mammograms starting at age 36 and older. Regardless of breast density, tomosynthesis is suggested. Monthly self-breast exams are recommended for women age 27 and older.       NOTE:   Mammography is not 100% accurate in the detection of breast cancer.  Accuracy decreases as mammographic density of the breast increases.  A negative mammogram should not deter further evaluation (including biopsy) of suspicious physical findings.     Recommendations are based on NCCN ([address]) and ACR [employer] of Radiology) guidelines.       Thank you for sending your patient to this ACR and FDA approved facility. If there are any physician concerns regarding this report, a Radiologist can be reached by calling the following number [phone].       Follow up Protocol for the [facility]:   BI-RADS 1 or 2: Cassidy Mejias will send a reminder when next mammogram is due. BI-RADS 3: Cassidy Mejias will send a reminder for recommended short term follow up.    BI-RADS 0, 4 or 5: Cassidy Mejias will contact patient to schedule all additional views and procedures.      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  Detached fragments of carcinoma  without associated stroma are also present.  Microcalcifications are  noted.  Intradepartmental consultation is obtained. Breast Cancer Marker Studies:  Estrogen Receptors (ER):  Positive:         Percentage of cells positive:  >90%         Intensity:  Strong    Progesterone Receptors (TX):  Positive:         Percentage of cells positive:  90%         Intensity:  Moderate to strong     The patient underwent on 1/18/2019 a Left breast needle localized lumpectomy, pathology:  CANCER CASE SUMMARY  Procedure-excision  Specimen laterality-left  Size (extent) of DCIS: 1.3 x 1.2 x 1.2 cm  Histologic type-ductal carcinoma in situ  Nuclear grade-grade 3 (high)  Necrosis-present, central  Margins (part A)-anterior and lateral margins involved by DCIS; DCIS is 1  mm from posterior margin  Margins (part B)- uninvolved by DCIS   Distance from closest margin: 1 mm from superior margin  Regional lymph nodes-no lymph nodes submitted or found  TNM classification (AJCC eighth edition)-pTis  NX MX  Ancillary studies-see prior report [facility]  for ER/ TX results    She underwent on 3/26/2019 a left lumpectomy specimen, new posterior margin, additional superior margin, path was neg for residual DCIS. The patient completed adjuvant radiation therapy on 6/6/2019. She was started on Arimidex on 6/18/2019. She is tolerating it well at this time, the stiffness in the back is much better. She denies any headaches or disease, no new bony pain. She denies any breast changes. No palpable lumps. Review of Systems;  CONSTITUTIONAL: No fever, chills. Good appetite and energy level. ENMT: Eyes: No diplopia; Nose: No epistaxis. Mouth: No sore throat. RESPIRATORY: No hemoptysis, shortness of breath, cough. CARDIOVASCULAR: No chest pain, palpitations.   GASTROINTESTINAL: No nausea/vomiting, abdominal pain, diarrhea/constipation. GENITOURINARY: No dysuria, urinary frequency, hematuria. NEURO: No syncope, presyncope, headache. MSK: pos for chronic joints pain. Remainder:  ROS NEGATIVE    Past Medical History:      Diagnosis Date    [facility] 2019    breast    Hyperlipidemia     Hypertension     Pneumonia 1953    Shingles      Patient Active Problem List   Diagnosis    Herpes zoster without complication    Essential hypertension    Ductal carcinoma in situ (DCIS) of left breast    Gastroesophageal reflux disease        Past Surgical History:      Procedure Laterality Date    BREAST BIOPSY Left 2/27/2019    LEFT BREAST NEEDLE LOCALIZED  LUMPECTOMY  WITH BIOZORB  PLACEMENT --TRIDENT performed by [facility] MD at [facility] LUMPECTOMY Left 3/26/2019    LEFT BREAST LUMPECTOMY RE-EXCISION ANTERIOR LATERAL SUPERIOR & POSTERIOR MARGINS , REPLACEMENT OF BIOZORB performed by Raya Ghotra MD at [facility] 59       Family History:  Family History   Problem Relation Age of Onset   Anya Lopez Parkinsonism Mother     Heart Attack Father     Cancer Father 80        leukemia    Cancer Maternal Aunt         Breast--60 or 76s    Cancer Maternal Grandfather         unknown    Cancer Paternal Cousin 16        leukemia       Medications:  Reviewed and reconciled.     Social History:  Social History     Socioeconomic History    Marital status: Single     Spouse name: Not on file    Number of children: Not on file    Years of education: Not on file    Highest education level: Not on file   Occupational History    Not on file   Social Needs    Financial resource strain: Not on file    Food insecurity     Worry: Not on file     Inability: Not on file    Transportation needs     Medical: Not on file     Non-medical: Not on file   Tobacco Use    Smoking status: Never Smoker    Smokeless tobacco: Never Used   Substance and Sexual Activity    Alcohol use: Yes     Alcohol/week: 1.0 standard drinks     Types: 1 Glasses of wine per week     Frequency: Monthly or less     Drinks per session: 1 or 2     Binge frequency: Never     Comment: occasionally    Drug use: No    Sexual activity: Not Currently   Lifestyle    Physical activity     Days per week: Not on file     Minutes per session: Not on file    Stress: Not on file   Relationships    Social connections     Talks on phone: Not on file     Gets together: Not on file     Attends Pentecostalism service: Not on file     Active member of club or organization: Not on file     Attends meetings of clubs or organizations: Not on file     Relationship status: Not on file    Intimate partner violence     Fear of current or ex partner: Not on file     Emotionally abused: Not on file     Physically abused: Not on file     Forced sexual activity: Not on file   Other Topics Concern    Not on file   Social History Narrative    Choco Killer lives in [place] alone - retired from [employer]. Allergies: Allergies   Allergen Reactions    Poultry Meal Hives    Hydrocodone-Acetaminophen Rash    Molds & Smuts Other (See Comments)    Pcn [Penicillins] Rash     OB/GYN:  Age of menarche was 8. Age of menopause was 61. Last menstrual period was age 61. Patient denies hormonal therapy. Patient is     Impression/Plan:      The patient is a pleasant 76 y.o. lady, with a past medical history significant for hyperlipidemia, and hypertension, who had presented with an abnormal screening Mammogram, she was diagnosed with a left breast DCIS, she is s/p Left breast needle localized lumpectomy, the DCIS is high grade with central necrosis, final margins negative, final pathologic stage is pTis NX MX, satge 0 disease, ER positive > 90% [place] positive, 90%.       I discussed with the patient her diagnosis, characteristics of her DCIS, risk of disease recurrence, the increased risk of having an invasive breast cancer in the

## 2020-05-07 NOTE — PROGRESS NOTES
Chief Complaint   Patient presents with    Gastroesophageal Reflux     6 mo f/u        Today's visit was performed as a tele-health visit during the COVID-19 pandemic. The patient has agreed to the limitations inherently involved with video virtual visits. HPI:  The patient is a 76 y.o. female who agreed to participate in a virtual visit and has complaints of above.     Essential hypertension  At last visit:  Not controlled in office today but per patient controlled at home, she has noticed it is elevated in doctors offices  - continue current dose of losartan 25 and chlorthalidone 25 qd  - monitor at home  - call if consistently >140/90   Today:  BP 120s/60s  Current regimen: losartan 25 and chlorthalidone 25 qd  Tolerating meds   No hypotension   Consistent intake of med   Denies CP/blurry vision/claudication   Measures at home: yes, well controlled     Gastroesophageal reflux disease, esophagitis presence not specified  At last visit 11/11/2019  Off of PPI  Can use Pepcid OTC if needed  Avoid trigger foods  Today:  Hasn't been using Pepcid infrequently   Avoids triggers    Health Maintenance:  Health Maintenance Due   Topic Date Due    Lipid screen  12/10/2019    Colon Cancer Screen FIT/FOBT  12/14/2019    Pneumococcal 65+ years Vaccine (2 of 2 - PPSV23) 01/11/2020       Past Medical History:   Diagnosis Date    Cancer ([facility] 2019    breast    Hyperlipidemia     Hypertension     Pneumonia 1953    Shingles        BP Readings from Last 3 Encounters:   01/17/20 (!) 175/73   12/13/19 128/78   11/11/19 (!) 159/73          Current Outpatient Medications:     anastrozole (ARIMIDEX) 1 MG tablet, Take 1 tablet by mouth daily, Disp: 90 tablet, Rfl: 2    calcium carbonate-vitamin D (CALCIUM 600 + D) 600-400 MG-UNIT TABS per tab, Take 1 tablet by mouth 2 times daily, Disp: 180 tablet, Rfl: 2    chlorthalidone (HYGROTON) 25 MG tablet, TAKE 1 TABLET BY MOUTH DAILY, Disp: 90 tablet, Rfl: 1    atorvastatin BASIC METABOLIC PANEL; Future  -     Lipid Panel; Future    2. Colon cancer screening  -     Cologuard (For External Results Only)    3. GERD  Controlled with PRN pepcid  Continue to avoid trigger foods     Pt will schedule appointment with me in August before I leave the practice. Walker Lopez received counseling on the following healthy behaviors: nutrition, exercise and medication adherence. Call or go to ED immediately if symptoms worsen or persist.  Return in about 3 months (around 8/8/2020). , or sooner if necessary. Counseled regarding above diagnosis, including possible risks and complications,  especially if left uncontrolled. Counseled regarding the possible side effects, risks, benefits and alternatives to treatment; patient verbalizes understanding, agrees, feels comfortable with and wishes to proceed with above treatment plan. Reviewed age and gender appropriate health screening exams and vaccinations. Advised patient regarding importance of keeping up with recommended health maintenance and to schedule as soon as possible if overdue. Patient verbalizes understanding and agrees with above counseling, assessment and plan. All questions answered. Walker Lopez was instructed to call if any new symptoms develop prior to next visit. Walker Lopez was evaluated by a Virtual Visit (video visit) encounter to address concerns as mentioned above. A caregiver was present when appropriate. Due to this being a TeleHealth encounter (During PHKOH-61 public health emergency), evaluation of the following organ systems was limited: Vitals/Constitutional/EENT/Resp/CV/GI//MS/Neuro/Skin/Heme-Lymph-Imm.   Pursuant to the emergency declaration under the [facility], [address] [place] [address] authority and the [employer] Act, this Virtual Visit was conducted with patient's (and/or legal guardian's) consent, to reduce the patient's risk of

## 2020-05-08 ENCOUNTER — VIRTUAL VISIT (OUTPATIENT)
Dept: FAMILY MEDICINE CLINIC | Age: 75
End: 2020-05-08
Payer: MEDICARE

## 2020-05-08 PROCEDURE — 99213 OFFICE O/P EST LOW 20 MIN: CPT | Performed by: FAMILY MEDICINE

## 2020-05-08 PROCEDURE — G8510 SCR DEP NEG, NO PLAN REQD: HCPCS | Performed by: FAMILY MEDICINE

## 2020-05-08 PROCEDURE — 3288F FALL RISK ASSESSMENT DOCD: CPT | Performed by: FAMILY MEDICINE

## 2020-05-08 ASSESSMENT — ENCOUNTER SYMPTOMS
COUGH: 0
CONSTIPATION: 0
SHORTNESS OF BREATH: 0
VOMITING: 0
DIARRHEA: 0
NAUSEA: 0
PHOTOPHOBIA: 0
ABDOMINAL PAIN: 0

## 2020-05-08 ASSESSMENT — PATIENT HEALTH QUESTIONNAIRE - PHQ9
SUM OF ALL RESPONSES TO PHQ QUESTIONS 1-9: 0
1. LITTLE INTEREST OR PLEASURE IN DOING THINGS: 0
2. FEELING DOWN, DEPRESSED OR HOPELESS: 0
SUM OF ALL RESPONSES TO PHQ QUESTIONS 1-9: 0
SUM OF ALL RESPONSES TO PHQ9 QUESTIONS 1 & 2: 0

## 2020-05-08 NOTE — PROGRESS NOTES
TeleMedicine Patient Consent    This visit was performed as a virtual video visit using a synchronous, two-way, audio-video telehealth technology platform. Patient identification was verified at the start of the visit, including the patient's telephone number and physical location. I discussed with the patient the nature of our telehealth visits, that:     1. Due to the nature of an audio- video modality, the only components of a physical exam that could be done are the elements supported by direct observation. 2. The provider will evaluate the patient and recommend diagnostics and treatments based on their assessment. 3. If it was felt that the patient should be evaluated in clinic or an emergency room setting, then they would be directed there. 4. Our sessions are not being recorded and that personal health information is protected. 5. Our team would provide follow up care in person if/when the patient needs it. Patient does agree to proceed with telemedicine consultation. Patient location: home address in [facility]    Physician location: regular office location Other people involved in call: Preceptor  This visit was completed virtually using Doxy. me    This visit was performed during the 5565 public health crisis and COVID-19 pandemic.   *Add GT modifier to all Video Visits*

## 2020-06-03 RX ORDER — POTASSIUM CHLORIDE 20 MEQ/1
TABLET, EXTENDED RELEASE ORAL
Qty: 60 TABLET | Refills: 0 | Status: SHIPPED
Start: 2020-06-03 | End: 2020-07-06

## 2020-06-05 ASSESSMENT — ENCOUNTER SYMPTOMS
BACK PAIN: 0
CHOKING: 0
DIARRHEA: 0
SHORTNESS OF BREATH: 0
ABDOMINAL DISTENTION: 0
SINUS PAIN: 0
WHEEZING: 0
NAUSEA: 0
CONSTIPATION: 0
COUGH: 0
VOICE CHANGE: 0
TROUBLE SWALLOWING: 0
ABDOMINAL PAIN: 0
RHINORRHEA: 0
SINUS PRESSURE: 0
BLOOD IN STOOL: 0
CHEST TIGHTNESS: 0
VOMITING: 0
EYE DISCHARGE: 0
EYE ITCHING: 0
SORE THROAT: 0

## 2020-06-05 NOTE — PROGRESS NOTES
Subjective:      Patient ID: Sascha Serrano is a 76 y.o. female. HPI     The patient is a pleasant 76 y.o. lady, with a past medical history significant for hyperlipidemia, and hypertension, who had presented with an abnormal screening Mammogram:  MAMMOGRAM (12/11/2018): INDICATION:   Screening.       HISTORY:   The patient has no personal history of cancer. The patient has the following family history of breast cancer:  maternal aunt.       MAMMOGRAM VIEWS:   The following mammographic views where obtained: bilateral craniocaudal; bilateral craniocaudal with tomosynthesis; bilateral mediolateral oblique; and bilateral mediolateral oblique with tomosynthesis       TOMOSYNTHESIS:   Tomosynthesis (3 Dimensional Breast Imaging) was used on this examination to aid in evaluation.       COMPARISON:   No prior imaging studies are available for comparison.       CAD:   This exam was reviewed using the [employer] Computer Aided Detection (CAD)       TISSUE DENSITY:   The breasts are heterogeneously dense (Type 3 density).       MAMMOGRAM FINDINGS:   In the right breast, no suspicious masses, areas of suspicious architectural distortion, suspicious calcifications, or additional suspicious findings are identified.           Finding 1:   There are coarse heterogeneous and fine pleomorphic calcifications measuring 17 mm seen in the upper outer quadrant of the left breast.       IMPRESSION:   Calcifications in the left breast require additional evaluation.    Spot magnification views with consideration for a stereotactic biopsy if needed.       =======================================   BI-RADS Category 0:  Incomplete: Need Additional Imaging Evaluation   =======================================       RISK ASSESSMENT:   During this patient's visit, information obtained was used to generate a lifetime risk assessment using the Tyrer-Cuzick model (also called the BRENNON [International Breast Cancer Intervention Study] Breast Cancer Risk Evaluation Tool).     - LIFETIME RISK -   Patient has a Tyrer Cuzick score of 7.9%   - BREAST TISSUE DENSITY -   Heterogeneously dense       -AVERAGE RISK (<15% )   Yearly screening mammograms starting at age 36 and older. Regardless of breast density, tomosynthesis is suggested. Monthly self-breast exams are recommended for women age 27 and older.       NOTE:   Mammography is not 100% accurate in the detection of breast cancer.  Accuracy decreases as mammographic density of the breast increases.  A negative mammogram should not deter further evaluation (including biopsy) of suspicious physical findings.     Recommendations are based on NCCN ([employer]) and ACR [employer] of Radiology) guidelines.       Thank you for sending your patient to this ACR and FDA approved facility. If there are any physician concerns regarding this report, a Radiologist can be reached by calling the following number [phone].       Follow up Protocol for the [facility]:   BI-RADS 1 or 2: Fausto Pallas will send a reminder when next mammogram is due. BI-RADS 3: Fausto Pallas will send a reminder for recommended short term follow up. BI-RADS 0, 4 or 5: Fausto Pallas will contact patient to schedule all additional views and procedures.      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  Detached fragments of carcinoma  without associated stroma are also present.  Microcalcifications are  noted.  Intradepartmental consultation is obtained.     Breast Cancer Marker Studies:  Estrogen Receptors (ER):  Positive:         Percentage of cells positive:  >90%         Intensity:  Strong    Progesterone Receptors (ID):  Positive:         Percentage of Reactions    Poultry Meal Hives    Hydrocodone-Acetaminophen Rash    Molds & Smuts Other (See Comments)    Pcn [Penicillins] Rash     Current Outpatient Medications on File Prior to Visit   Medication Sig Dispense Refill    potassium chloride (KLOR-CON M) 20 MEQ extended release tablet TAKE 2 TABLETS BY MOUTH DAILY 60 tablet 0    anastrozole (ARIMIDEX) 1 MG tablet Take 1 tablet by mouth daily 90 tablet 2    calcium carbonate-vitamin D (CALCIUM 600 + D) 600-400 MG-UNIT TABS per tab Take 1 tablet by mouth 2 times daily 180 tablet 2    chlorthalidone (HYGROTON) 25 MG tablet TAKE 1 TABLET BY MOUTH DAILY 90 tablet 1    atorvastatin (LIPITOR) 40 MG tablet TAKE 1 TABLET BY MOUTH EVERY NIGHT 30 tablet 3    losartan (COZAAR) 25 MG tablet TAKE 1 TABLET BY MOUTH DAILY 90 tablet 1    cetirizine (ZYRTEC) 10 MG tablet Take 10 mg by mouth daily      ibuprofen (ADVIL MIGRAINE) 200 MG CAPS Take 1 capsule by mouth as needed for Pain      Multiple Vitamins-Minerals (EYE VITAMINS) CAPS Take by mouth 2 times daily       No current facility-administered medications on file prior to visit. Review of Systems   Constitutional: Negative for activity change, appetite change, chills, fatigue, fever and unexpected weight change. Doing well. Doing well. Stable scattered arthralgias, not interfering with her quality of life. HENT: Negative for congestion, postnasal drip, rhinorrhea, sinus pressure, sinus pain, sore throat, trouble swallowing and voice change. Eyes: Negative for discharge, itching and visual disturbance. Respiratory: Negative for cough, choking, chest tightness, shortness of breath and wheezing. Cardiovascular: Negative for chest pain, palpitations and leg swelling. Gastrointestinal: Negative for abdominal distention, abdominal pain, blood in stool, constipation, diarrhea, nausea and vomiting. Endocrine: Negative for cold intolerance and heat intolerance.    Genitourinary: Negative for difficulty urinating, dysuria, frequency and hematuria. Musculoskeletal: Negative for arthralgias, back pain, gait problem, joint swelling, myalgias, neck pain and neck stiffness. Allergic/Immunologic: Negative for environmental allergies and food allergies. Neurological: Negative for dizziness, seizures, syncope, speech difficulty, weakness, light-headedness and headaches. Hematological: Negative for adenopathy. Does not bruise/bleed easily. Psychiatric/Behavioral: Negative for agitation, confusion and decreased concentration. The patient is not nervous/anxious. Objective:   Physical Exam  Vitals signs and nursing note reviewed. Constitutional:       General: She is not in acute distress. Appearance: Normal appearance. She is well-developed. She is not diaphoretic. Comments: ECOG is stable 0; pleasant and cooperative. HENT:      Head: Normocephalic and atraumatic. Mouth/Throat:      Pharynx: No oropharyngeal exudate. Eyes:      General: No scleral icterus. Right eye: No discharge. Left eye: No discharge. Conjunctiva/sclera: Conjunctivae normal.   Neck:      Musculoskeletal: Normal range of motion and neck supple. Thyroid: No thyromegaly. Vascular: No JVD. Trachea: No tracheal deviation. Cardiovascular:      Rate and Rhythm: Normal rate and regular rhythm. Heart sounds: No murmur. No friction rub. No gallop. Pulmonary:      Effort: Pulmonary effort is normal. No respiratory distress or retractions. Breath sounds: Normal breath sounds. No stridor. No wheezing or rales. Chest:      Chest wall: No mass, lacerations, deformity, swelling, tenderness or edema. Breasts: Breasts are asymmetrical (Left breast smaller than right). Right: No inverted nipple, mass, nipple discharge, skin change or tenderness. Left: No inverted nipple, mass, nipple discharge, skin change or tenderness.           Comments: Right breast

## 2020-06-09 ENCOUNTER — OFFICE VISIT (OUTPATIENT)
Dept: BREAST CENTER | Age: 75
End: 2020-06-09
Payer: MEDICARE

## 2020-06-09 ENCOUNTER — HOSPITAL ENCOUNTER (OUTPATIENT)
Dept: GENERAL RADIOLOGY | Age: 75
Discharge: HOME | End: 2020-06-11
Payer: MEDICARE

## 2020-06-09 VITALS
HEIGHT: 58 IN | WEIGHT: 149 LBS | OXYGEN SATURATION: 99 % | BODY MASS INDEX: 31.28 KG/M2 | RESPIRATION RATE: 18 BRPM | HEART RATE: 77 BPM | DIASTOLIC BLOOD PRESSURE: 64 MMHG | TEMPERATURE: 98.6 F | SYSTOLIC BLOOD PRESSURE: 128 MMHG

## 2020-06-09 PROCEDURE — 99213 OFFICE O/P EST LOW 20 MIN: CPT | Performed by: NURSE PRACTITIONER

## 2020-06-09 PROCEDURE — 77080 DXA BONE DENSITY AXIAL: CPT

## 2020-06-23 ENCOUNTER — HOSPITAL ENCOUNTER (OUTPATIENT)
Age: 75
Discharge: HOME | End: 2020-06-23
Payer: MEDICARE

## 2020-06-23 LAB
ALBUMIN SERPL-MCNC: 4.3 G/DL (ref 3.5–5.2)
ALP BLD-CCNC: 111 U/L (ref 35–104)
ALT SERPL-CCNC: 12 U/L (ref 0–32)
ANION GAP SERPL CALCULATED.3IONS-SCNC: 11 MMOL/L (ref 7–16)
AST SERPL-CCNC: 19 U/L (ref 0–31)
BASOPHILS ABSOLUTE: 0.06 E9/L (ref 0–0.2)
BASOPHILS RELATIVE PERCENT: 1 % (ref 0–2)
BILIRUB SERPL-MCNC: 0.4 MG/DL (ref 0–1.2)
BUN BLDV-MCNC: 13 MG/DL (ref 8–23)
CALCIUM SERPL-MCNC: 10.1 MG/DL (ref 8.6–10.2)
CHLORIDE BLD-SCNC: 97 MMOL/L (ref 98–107)
CHOLESTEROL, TOTAL: 126 MG/DL (ref 0–199)
CO2: 26 MMOL/L (ref 22–29)
CREAT SERPL-MCNC: 0.7 MG/DL (ref 0.5–1)
EOSINOPHILS ABSOLUTE: 0.07 E9/L (ref 0.05–0.5)
EOSINOPHILS RELATIVE PERCENT: 1.2 % (ref 0–6)
GFR AFRICAN AMERICAN: >60
GFR NON-AFRICAN AMERICAN: >60 ML/MIN/1.73
GLUCOSE BLD-MCNC: 103 MG/DL (ref 74–99)
HCT VFR BLD CALC: 37.1 % (ref 34–48)
HDLC SERPL-MCNC: 66 MG/DL
HEMOGLOBIN: 11.9 G/DL (ref 11.5–15.5)
IMMATURE GRANULOCYTES #: 0.01 E9/L
IMMATURE GRANULOCYTES %: 0.2 % (ref 0–5)
LDL CHOLESTEROL CALCULATED: 40 MG/DL (ref 0–99)
LYMPHOCYTES ABSOLUTE: 1.24 E9/L (ref 1.5–4)
LYMPHOCYTES RELATIVE PERCENT: 20.7 % (ref 20–42)
MCH RBC QN AUTO: 27.2 PG (ref 26–35)
MCHC RBC AUTO-ENTMCNC: 32.1 % (ref 32–34.5)
MCV RBC AUTO: 84.7 FL (ref 80–99.9)
MONOCYTES ABSOLUTE: 0.71 E9/L (ref 0.1–0.95)
MONOCYTES RELATIVE PERCENT: 11.9 % (ref 2–12)
NEUTROPHILS ABSOLUTE: 3.9 E9/L (ref 1.8–7.3)
NEUTROPHILS RELATIVE PERCENT: 65 % (ref 43–80)
PDW BLD-RTO: 14.8 FL (ref 11.5–15)
PLATELET # BLD: 234 E9/L (ref 130–450)
PMV BLD AUTO: 11.4 FL (ref 7–12)
POTASSIUM SERPL-SCNC: 4 MMOL/L (ref 3.5–5)
RBC # BLD: 4.38 E12/L (ref 3.5–5.5)
SODIUM BLD-SCNC: 134 MMOL/L (ref 132–146)
TOTAL PROTEIN: 8 G/DL (ref 6.4–8.3)
TRIGL SERPL-MCNC: 101 MG/DL (ref 0–149)
VLDLC SERPL CALC-MCNC: 20 MG/DL
WBC # BLD: 6 E9/L (ref 4.5–11.5)

## 2020-06-23 PROCEDURE — 80053 COMPREHEN METABOLIC PANEL: CPT

## 2020-06-23 PROCEDURE — 80061 LIPID PANEL: CPT

## 2020-06-23 PROCEDURE — 36415 COLL VENOUS BLD VENIPUNCTURE: CPT

## 2020-06-23 PROCEDURE — 85025 COMPLETE CBC W/AUTO DIFF WBC: CPT

## 2020-06-24 RX ORDER — ATORVASTATIN CALCIUM 40 MG/1
40 TABLET, FILM COATED ORAL DAILY
Qty: 90 TABLET | Refills: 1 | Status: SHIPPED
Start: 2020-06-24 | End: 2020-11-27 | Stop reason: SDUPTHER

## 2020-07-06 RX ORDER — POTASSIUM CHLORIDE 20 MEQ/1
TABLET, EXTENDED RELEASE ORAL
Qty: 60 TABLET | Refills: 0 | Status: SHIPPED
Start: 2020-07-06 | End: 2020-07-21

## 2020-07-13 RX ORDER — LOSARTAN POTASSIUM 25 MG/1
25 TABLET ORAL DAILY
Qty: 90 TABLET | Refills: 1 | Status: SHIPPED
Start: 2020-07-13 | End: 2020-11-27 | Stop reason: SDUPTHER

## 2020-07-21 RX ORDER — POTASSIUM CHLORIDE 20 MEQ/1
TABLET, EXTENDED RELEASE ORAL
Qty: 60 TABLET | Refills: 0 | Status: SHIPPED
Start: 2020-07-21 | End: 2020-08-17 | Stop reason: SDUPTHER

## 2020-07-21 RX ORDER — CHLORTHALIDONE 25 MG/1
25 TABLET ORAL DAILY
Qty: 90 TABLET | Refills: 1 | Status: SHIPPED
Start: 2020-07-21 | End: 2020-11-27 | Stop reason: SDUPTHER

## 2020-08-16 NOTE — PROGRESS NOTES
Chief Complaint   Patient presents with    Hypertension    Gastroesophageal Reflux       HPI:  The patient is a 76 y.o. female who presented to the office today to follow up on hypertension. Essential hypertension  Last virtual visit 5/8/2020:  Controlled. Continue chlorthalidone and losartan 25 mg daily   Labs are non-urgent, can complete when comfortable during the pandemic  -     BASIC METABOLIC PANEL; Future  -     Lipid Panel;  Future  Today:  /64  Current regimen: chlorthalidone and losartan 25 mg qd  Tolerating meds   No hypotension   Consistent intake of med   Denies CP/blurry vision/claudication   Measures at home: yes, well controlled  Has been losing weight since she doesn't eat out due to the pandemic     Health Maintenance:  Health Maintenance Due   Topic Date Due    Hepatitis C screen  1945    Colon Cancer Screen FIT/FOBT  12/14/2019    Pneumococcal 65+ years Vaccine (2 of 2 - PPSV23) 01/11/2020       Past Medical History:   Diagnosis Date    Cancer ([facility] [address] 2019    breast    Hyperlipidemia     Hypertension     Pneumonia 1953    Shingles        BP Readings from Last 3 Encounters:   08/17/20 (!) 145/64   06/09/20 128/64   01/17/20 (!) 175/73          Current Outpatient Medications:     potassium chloride (KLOR-CON M) 20 MEQ extended release tablet, TAKE 2 TABLETS BY MOUTH DAILY, Disp: 180 tablet, Rfl: 1    chlorthalidone (HYGROTON) 25 MG tablet, TAKE 1 TABLET BY MOUTH DAILY, Disp: 90 tablet, Rfl: 1    losartan (COZAAR) 25 MG tablet, Take 1 tablet by mouth daily, Disp: 90 tablet, Rfl: 1    atorvastatin (LIPITOR) 40 MG tablet, Take 1 tablet by mouth daily, Disp: 90 tablet, Rfl: 1    NONFORMULARY, 1 tablet 2 times daily Glucosamine, Disp: , Rfl:     anastrozole (ARIMIDEX) 1 MG tablet, Take 1 tablet by mouth daily, Disp: 90 tablet, Rfl: 2    calcium carbonate-vitamin D (CALCIUM 600 + D) 600-400 MG-UNIT TABS per tab, Take 1 tablet by mouth 2 times daily, Disp: 180 tablet, breath. Cardiovascular: Negative for chest pain and palpitations. Gastrointestinal: Negative for abdominal pain, constipation, diarrhea, nausea and vomiting. Genitourinary: Negative for dysuria, frequency and hematuria. Skin: Negative for rash and wound. Neurological: Negative for dizziness, light-headedness and headaches. Physical Exam  Vitals signs reviewed. Constitutional:       General: She is not in acute distress. Appearance: She is well-developed. She is not diaphoretic. HENT:      Head: Normocephalic and atraumatic. Eyes:      Conjunctiva/sclera: Conjunctivae normal.   Neck:      Musculoskeletal: Neck supple. Cardiovascular:      Rate and Rhythm: Normal rate and regular rhythm. Heart sounds: Normal heart sounds. No murmur. No friction rub. No gallop. Pulmonary:      Effort: Pulmonary effort is normal. No respiratory distress. Breath sounds: Normal breath sounds. No wheezing or rales. Skin:     General: Skin is warm. Neurological:      General: No focal deficit present. Mental Status: She is alert and oriented to person, place, and time. Psychiatric:         Mood and Affect: Mood normal.         Behavior: Behavior normal.         ASSESSMENT      Diagnosis Orders   1. Essential hypertension  potassium chloride (KLOR-CON M) 20 MEQ extended release tablet         PLAN:     Deyanira Xiong was seen today for hypertension and gastroesophageal reflux. Diagnoses and all orders for this visit:    Essential hypertension  Controlled at home. Continue losartan and chlorthalidone. -     potassium chloride (KLOR-CON M) 20 MEQ extended release tablet; TAKE 2 TABLETS BY MOUTH DAILY      Deyanira Xiong received counseling on the following healthy behaviors: nutrition, exercise and medication adherence.     Call or go to ED immediately if symptoms worsen or persist.  Return in about 3 months (around 11/17/2020), or if symptoms worsen or fail to improve, for VV to establish care with  [facility] and f/u on HTN. , or sooner if necessary. Counseled regarding above diagnosis, including possible risks and complications,  especially if left uncontrolled. Counseled regarding the possible side effects, risks, benefits and alternatives to treatment; patient verbalizes understanding, agrees, feels comfortable with and wishes to proceed with above treatment plan. Advised patient to call with any new medication issues, and read all Rx info from pharmacy to assure aware of all possible risks and side effects of medication before taking. Patient verbalizes understanding and agrees with above counseling, assessment and plan. All questions answered. Michael Viera was instructed to call if any new symptoms develop prior to next visit.        Fco Raphael M.D  Family Medicine Resident PGY-3  [address] [facility]   8/17/20

## 2020-08-17 ENCOUNTER — OFFICE VISIT (OUTPATIENT)
Dept: FAMILY MEDICINE CLINIC | Age: 75
End: 2020-08-17
Payer: MEDICARE

## 2020-08-17 VITALS
BODY MASS INDEX: 30.64 KG/M2 | RESPIRATION RATE: 16 BRPM | TEMPERATURE: 97 F | HEIGHT: 58 IN | DIASTOLIC BLOOD PRESSURE: 64 MMHG | SYSTOLIC BLOOD PRESSURE: 145 MMHG | WEIGHT: 146 LBS | OXYGEN SATURATION: 99 % | HEART RATE: 82 BPM

## 2020-08-17 PROBLEM — K21.9 GASTROESOPHAGEAL REFLUX DISEASE: Status: RESOLVED | Noted: 2019-04-18 | Resolved: 2020-08-17

## 2020-08-17 PROCEDURE — 99213 OFFICE O/P EST LOW 20 MIN: CPT | Performed by: FAMILY MEDICINE

## 2020-08-17 RX ORDER — POTASSIUM CHLORIDE 20 MEQ/1
TABLET, EXTENDED RELEASE ORAL
Qty: 180 TABLET | Refills: 1 | Status: SHIPPED
Start: 2020-08-17 | End: 2020-09-03

## 2020-08-17 ASSESSMENT — ENCOUNTER SYMPTOMS
VOMITING: 0
NAUSEA: 0
CONSTIPATION: 0
COUGH: 0
SHORTNESS OF BREATH: 0
ABDOMINAL PAIN: 0
PHOTOPHOBIA: 0
DIARRHEA: 0

## 2020-08-17 NOTE — PROGRESS NOTES
Subjective:    Usama Solis is here for a blood pressure check. She is feeling well. Her home blood pressure readings are good at home and she rushed here today. Her pressure was a little up due to rushing. ROS: Otherwise negative    Patient Active Problem List   Diagnosis    Herpes zoster without complication    Essential hypertension    Ductal carcinoma in situ (DCIS) of left breast       Past medical, surgical, family and social history were reviewed, non-contributory, and unchanged unless otherwise stated. Objective:    BP (!) 145/64   Pulse 82   Temp 97 °F (36.1 °C) (Temporal)   Resp 16   Ht 4' 10\" (1.473 m)   Wt 146 lb (66.2 kg)   LMP  (LMP Unknown)   SpO2 99%   Breastfeeding No   BMI 30.51 kg/m²     Exam is as noted by resident with the following changes, additions or corrections:      Assessment/Plan:        Usama Solis was seen today for hypertension and gastroesophageal reflux. Diagnoses and all orders for this visit:    Essential hypertension  -     potassium chloride (KLOR-CON M) 20 MEQ extended release tablet; TAKE 2 TABLETS BY MOUTH DAILY           Attending Physician Statement    I have reviewed the chart, including any radiology or labs. I have discussed the case, including pertinent history and exam findings with the resident. I agree with the assessment, plan and orders as documented by the resident. Please refer to the resident note for additional information.       Electronically signed by Alba Toure DO on 8/20/2020 at 8:27 AM

## 2020-08-25 ENCOUNTER — OFFICE VISIT (OUTPATIENT)
Dept: ONCOLOGY | Age: 75
End: 2020-08-25
Payer: MEDICARE

## 2020-08-25 ENCOUNTER — HOSPITAL ENCOUNTER (OUTPATIENT)
Dept: INFUSION THERAPY | Age: 75
Discharge: HOME | End: 2020-08-25
Payer: MEDICARE

## 2020-08-25 VITALS
HEIGHT: 58 IN | BODY MASS INDEX: 30.16 KG/M2 | DIASTOLIC BLOOD PRESSURE: 68 MMHG | SYSTOLIC BLOOD PRESSURE: 153 MMHG | WEIGHT: 143.7 LBS | OXYGEN SATURATION: 97 % | TEMPERATURE: 97.5 F | HEART RATE: 93 BPM

## 2020-08-25 PROCEDURE — 99212 OFFICE O/P EST SF 10 MIN: CPT

## 2020-08-25 PROCEDURE — 99214 OFFICE O/P EST MOD 30 MIN: CPT | Performed by: INTERNAL MEDICINE

## 2020-08-25 NOTE — PROGRESS NOTES
[address] [facility]  [address] [place] [id number]  Dept: [phone]  Attending Progress note      Reason for Visit:   Left Breast DCIS. Referring Physician:  Josefa Lopez MD.    PCP:  Ned Luna DO    History of Present Illness: The patient is a pleasant 76 y.o. lady, with a past medical history significant for hyperlipidemia, and hypertension, who had presented with an abnormal screening Mammogram:  MAMMOGRAM (12/11/2018): INDICATION:   Screening.       HISTORY:   The patient has no personal history of cancer. The patient has the following family history of breast cancer:  maternal aunt.       MAMMOGRAM VIEWS:   The following mammographic views where obtained: bilateral craniocaudal; bilateral craniocaudal with tomosynthesis; bilateral mediolateral oblique; and bilateral mediolateral oblique with tomosynthesis       TOMOSYNTHESIS:   Tomosynthesis (3 Dimensional Breast Imaging) was used on this examination to aid in evaluation.       COMPARISON:   No prior imaging studies are available for comparison.       CAD:   This exam was reviewed using the [employer] Computer Aided Detection (CAD)       TISSUE DENSITY:   The breasts are heterogeneously dense (Type 3 density).       MAMMOGRAM FINDINGS:   In the right breast, no suspicious masses, areas of suspicious architectural distortion, suspicious calcifications, or additional suspicious findings are identified.           Finding 1:   There are coarse heterogeneous and fine pleomorphic calcifications measuring 17 mm seen in the upper outer quadrant of the left breast.       IMPRESSION:   Calcifications in the left breast require additional evaluation.    Spot magnification views with consideration for a stereotactic biopsy if needed.       =======================================   BI-RADS Category 0:  Incomplete: Need Additional Imaging Evaluation   =======================================       RISK ASSESSMENT:   During this patient's visit, information obtained was used to generate a lifetime risk assessment using the Tyrer-Cuzick model (also called the BRENNON [International Breast Cancer Intervention Study] Breast Cancer Risk Evaluation Tool).     - LIFETIME RISK -   Patient has a Tyrer Cuzick score of 7.9%   - BREAST TISSUE DENSITY -   Heterogeneously dense       -AVERAGE RISK (<15% )   Yearly screening mammograms starting at age 36 and older. Regardless of breast density, tomosynthesis is suggested. Monthly self-breast exams are recommended for women age 27 and older.       NOTE:   Mammography is not 100% accurate in the detection of breast cancer.  Accuracy decreases as mammographic density of the breast increases.  A negative mammogram should not deter further evaluation (including biopsy) of suspicious physical findings.     Recommendations are based on NCCN ([address]) and ACR [employer] of Radiology) guidelines.       Thank you for sending your patient to this ACR and FDA approved facility. If there are any physician concerns regarding this report, a Radiologist can be reached by calling the following number [phone].       Follow up Protocol for the [facility]:   BI-RADS 1 or 2: Elise Felipe will send a reminder when next mammogram is due. BI-RADS 3: Elise Felipe will send a reminder for recommended short term follow up.    BI-RADS 0, 4 or 5: Elise Felipe will contact patient to schedule all additional views and procedures.      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  Detached fragments of carcinoma  without associated stroma are also present.  Microcalcifications are  noted.  Intradepartmental consultation is obtained. Breast Cancer Marker Studies:  Estrogen Receptors (ER):  Positive:         Percentage of cells positive:  >90%         Intensity:  Strong    Progesterone Receptors (LA):  Positive:         Percentage of cells positive:  90%         Intensity:  Moderate to strong     The patient underwent on 1/18/2019 a Left breast needle localized lumpectomy, pathology:  CANCER CASE SUMMARY  Procedure-excision  Specimen laterality-left  Size (extent) of DCIS: 1.3 x 1.2 x 1.2 cm  Histologic type-ductal carcinoma in situ  Nuclear grade-grade 3 (high)  Necrosis-present, central  Margins (part A)-anterior and lateral margins involved by DCIS; DCIS is 1  mm from posterior margin  Margins (part B)- uninvolved by DCIS   Distance from closest margin: 1 mm from superior margin  Regional lymph nodes-no lymph nodes submitted or found  TNM classification (AJCC eighth edition)-pTis  NX MX  Ancillary studies-see prior report [facility]  for ER/ LA results    She underwent on 3/26/2019 a left lumpectomy specimen, new posterior margin, additional superior margin, path was neg for residual DCIS. The patient completed adjuvant radiation therapy on 6/6/2019. She was started on Arimidex on 6/18/2019. The patient denies any side effects from the Arimidex, she is feeling well in general, no new bony pain. No new breast changes. Review of Systems;  CONSTITUTIONAL: No fever, chills. Good appetite and energy level. ENMT: Eyes: No diplopia; Nose: No epistaxis. Mouth: No sore throat. RESPIRATORY: No hemoptysis, shortness of breath, cough. CARDIOVASCULAR: No chest pain, palpitations. GASTROINTESTINAL: No nausea/vomiting, abdominal pain, diarrhea/constipation. GENITOURINARY: No dysuria, urinary frequency, hematuria. NEURO: No syncope, presyncope, headache. MSK: pos for chronic joints pain.   Remainder:  ROS NEGATIVE    Past Medical History:      Diagnosis Date    Cancer [facility]) 2019    breast    Hyperlipidemia     Hypertension  Pneumonia 1953    Shingles      Patient Active Problem List   Diagnosis    Herpes zoster without complication    Essential hypertension    Ductal carcinoma in situ (DCIS) of left breast        Past Surgical History:      Procedure Laterality Date    BREAST BIOPSY Left 2/27/2019    LEFT BREAST NEEDLE LOCALIZED  LUMPECTOMY  WITH BIOZORB  PLACEMENT --TRIDENT performed by Dyana Almendarez MD at . [address] LUMPECTOMY Left 3/26/2019    LEFT BREAST LUMPECTOMY RE-EXCISION ANTERIOR LATERAL SUPERIOR & POSTERIOR MARGINS , REPLACEMENT OF BIOZORB performed by Dyana Almendarez MD at [facility] 59       Family History:  Family History   Problem Relation Age of Onset   Luiz Hernandez Parkinsonism Mother     Heart Attack Father     Cancer Father 80        leukemia    Cancer Maternal Aunt         Breast--60 or 76s    Cancer Maternal Grandfather         unknown    Cancer Paternal Cousin 16        leukemia       Medications:  Reviewed and reconciled. Social History:  Social History     Socioeconomic History    Marital status: Single     Spouse name: Not on file    Number of children: Not on file    Years of education: Not on file    Highest education level: Not on file   Occupational History    Not on file   Social Needs    Financial resource strain: Not on file    Food insecurity     Worry: Not on file     Inability: Not on file    Transportation needs     Medical: Not on file     Non-medical: Not on file   Tobacco Use    Smoking status: Never Smoker    Smokeless tobacco: Never Used   Substance and Sexual Activity    Alcohol use:  Yes     Alcohol/week: 1.0 standard drinks     Types: 1 Glasses of wine per week     Frequency: Monthly or less     Drinks per session: 1 or 2     Binge frequency: Never     Comment: occasionally    Drug use: No    Sexual activity: Not Currently   Lifestyle    Physical activity     Days per week: Not on file     Minutes per session: Not on file    Stress: Not on file   Relationships    Social connections     Talks on phone: Not on file     Gets together: Not on file     Attends Christian service: Not on file     Active member of club or organization: Not on file     Attends meetings of clubs or organizations: Not on file     Relationship status: Not on file    Intimate partner violence     Fear of current or ex partner: Not on file     Emotionally abused: Not on file     Physically abused: Not on file     Forced sexual activity: Not on file   Other Topics Concern    Not on file   Social History Narrative    Raquel Connolly lives in [place] alone - retired from [employer] Allergies: Allergies   Allergen Reactions    Poultry Meal Hives    Hydrocodone-Acetaminophen Rash    Molds & Smuts Other (See Comments)    Pcn [Penicillins] Rash     OB/GYN:  Age of menarche was 8. Age of menopause was 61. Last menstrual period was age 61. Patient denies hormonal therapy. Patient is     Physical Exam:  BP (!) 153/68 (Site: Right Upper Arm, Position: Sitting, Cuff Size: Medium Adult)   Pulse 93   Temp 97.5 °F (36.4 °C) (Temporal)   Ht 4' 10\" (1.473 m)   Wt 143 lb 11.2 oz (65.2 kg)   LMP  (LMP Unknown)   SpO2 97%   BMI 30.03 kg/m²     GENERAL: Alert, oriented x 3, not in acute distress. HEENT: PERRLA; EOMI. Oropharynx clear. NECK: Supple. No palpable cervical or supraclavicular lymphadenopathy. LUNGS: Good air entry bilaterally. No wheezing, crackles or rhonchi. BREASTS: Right breast exam is negative for any skin changes, no nipple discharge, no palpable masses, no palpable axillary lymphadenopathy. The left breast exam is negative for any nipple discharge, she has a scar from her lumpectomy, no palpable masses, no palpable left axillary lymphadenopathy. CARDIOVASCULAR: Regular rate. No murmurs, rubs or gallops. ABDOMEN: Soft. Non-tender, non-distended. Positive bowel sounds.   EXTREMITIES: Without clubbing, [address] [facility]  [address] [facility] [id number]  Dept: [phone]

## 2020-09-03 RX ORDER — POTASSIUM CHLORIDE 20 MEQ/1
TABLET, EXTENDED RELEASE ORAL
Qty: 60 TABLET | Refills: 5 | Status: SHIPPED
Start: 2020-09-03 | End: 2020-11-27 | Stop reason: SDUPTHER

## 2020-10-14 ENCOUNTER — NURSE ONLY (OUTPATIENT)
Dept: FAMILY MEDICINE CLINIC | Age: 75
End: 2020-10-14
Payer: MEDICARE

## 2020-10-14 PROCEDURE — 90694 VACC AIIV4 NO PRSRV 0.5ML IM: CPT | Performed by: FAMILY MEDICINE

## 2020-10-14 PROCEDURE — G0008 ADMIN INFLUENZA VIRUS VAC: HCPCS | Performed by: FAMILY MEDICINE

## 2020-10-14 PROCEDURE — 90732 PPSV23 VACC 2 YRS+ SUBQ/IM: CPT | Performed by: FAMILY MEDICINE

## 2020-10-14 PROCEDURE — G0009 ADMIN PNEUMOCOCCAL VACCINE: HCPCS | Performed by: FAMILY MEDICINE

## 2020-11-17 ENCOUNTER — HOSPITAL ENCOUNTER (OUTPATIENT)
Dept: INFUSION THERAPY | Age: 75
Discharge: HOME | End: 2020-11-17
Payer: MEDICARE

## 2020-11-17 ENCOUNTER — OFFICE VISIT (OUTPATIENT)
Dept: ONCOLOGY | Age: 75
End: 2020-11-17
Payer: MEDICARE

## 2020-11-17 VITALS
BODY MASS INDEX: 29.36 KG/M2 | TEMPERATURE: 98.2 F | HEART RATE: 87 BPM | DIASTOLIC BLOOD PRESSURE: 80 MMHG | WEIGHT: 140.5 LBS | SYSTOLIC BLOOD PRESSURE: 132 MMHG | OXYGEN SATURATION: 99 %

## 2020-11-17 PROCEDURE — 99214 OFFICE O/P EST MOD 30 MIN: CPT | Performed by: INTERNAL MEDICINE

## 2020-11-17 PROCEDURE — 99212 OFFICE O/P EST SF 10 MIN: CPT

## 2020-11-17 RX ORDER — ANASTROZOLE 1 MG/1
1 TABLET ORAL DAILY
Qty: 30 TABLET | Refills: 1 | Status: SHIPPED | OUTPATIENT
Start: 2020-11-17 | End: 2020-12-04 | Stop reason: SDUPTHER

## 2020-11-17 NOTE — PROGRESS NOTES
[address] [facility]  [address] [place] [id number]  Dept: [phone]  Attending Progress note      Reason for Visit:   Left Breast DCIS. Referring Physician:  Michael Nicholson MD.    PCP:  Elayne Garcia DO    History of Present Illness: The patient is a pleasant 76 y.o. lady, with a past medical history significant for hyperlipidemia, and hypertension, who had presented with an abnormal screening Mammogram:  MAMMOGRAM (12/11/2018): INDICATION:   Screening.       HISTORY:   The patient has no personal history of cancer. The patient has the following family history of breast cancer:  maternal aunt.       MAMMOGRAM VIEWS:   The following mammographic views where obtained: bilateral craniocaudal; bilateral craniocaudal with tomosynthesis; bilateral mediolateral oblique; and bilateral mediolateral oblique with tomosynthesis       TOMOSYNTHESIS:   Tomosynthesis (3 Dimensional Breast Imaging) was used on this examination to aid in evaluation.       COMPARISON:   No prior imaging studies are available for comparison.       CAD:   This exam was reviewed using the [employer] Computer Aided Detection (CAD)       TISSUE DENSITY:   The breasts are heterogeneously dense (Type 3 density).       MAMMOGRAM FINDINGS:   In the right breast, no suspicious masses, areas of suspicious architectural distortion, suspicious calcifications, or additional suspicious findings are identified.           Finding 1:   There are coarse heterogeneous and fine pleomorphic calcifications measuring 17 mm seen in the upper outer quadrant of the left breast.       IMPRESSION:   Calcifications in the left breast require additional evaluation.    Spot magnification views with consideration for a stereotactic biopsy if needed.       =======================================   BI-RADS Category 0:  Incomplete: Need Additional Imaging Evaluation   =======================================       RISK ASSESSMENT:   During this patient's visit, information obtained was used to generate a lifetime risk assessment using the Tyrer-Cuzick model (also called the BRENNON [International Breast Cancer Intervention Study] Breast Cancer Risk Evaluation Tool).     - LIFETIME RISK -   Patient has a Tyrer Cuzick score of 7.9%   - BREAST TISSUE DENSITY -   Heterogeneously dense       -AVERAGE RISK (<15% )   Yearly screening mammograms starting at age 36 and older. Regardless of breast density, tomosynthesis is suggested. Monthly self-breast exams are recommended for women age 27 and older.       NOTE:   Mammography is not 100% accurate in the detection of breast cancer.  Accuracy decreases as mammographic density of the breast increases.  A negative mammogram should not deter further evaluation (including biopsy) of suspicious physical findings.     Recommendations are based on NCCN ([address]) and ACR [employer] of Radiology) guidelines.       Thank you for sending your patient to this ACR and FDA approved facility. If there are any physician concerns regarding this report, a Radiologist can be reached by calling the following number [phone].       Follow up Protocol for the [place]:   BI-RADS 1 or 2: Elvira Numbers will send a reminder when next mammogram is due. BI-RADS 3: Elvira Numbers will send a reminder for recommended short term follow up.    BI-RADS 0, 4 or 5: Elvira Numbers will contact patient to schedule all additional views and procedures.      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  Detached fragments of carcinoma  without associated stroma are also present.  Microcalcifications are  noted.  Intradepartmental consultation is obtained. Breast Cancer Marker Studies:  Estrogen Receptors (ER):  Positive:         Percentage of cells positive:  >90%         Intensity:  Strong    Progesterone Receptors (DE):  Positive:         Percentage of cells positive:  90%         Intensity:  Moderate to strong     The patient underwent on 1/18/2019 a Left breast needle localized lumpectomy, pathology:  CANCER CASE SUMMARY  Procedure-excision  Specimen laterality-left  Size (extent) of DCIS: 1.3 x 1.2 x 1.2 cm  Histologic type-ductal carcinoma in situ  Nuclear grade-grade 3 (high)  Necrosis-present, central  Margins (part A)-anterior and lateral margins involved by DCIS; DCIS is 1  mm from posterior margin  Margins (part B)- uninvolved by DCIS   Distance from closest margin: 1 mm from superior margin  Regional lymph nodes-no lymph nodes submitted or found  TNM classification (AJCC eighth edition)-pTis  NX MX  Ancillary studies-see prior report [facility]  for ER/ DE results    She underwent on 3/26/2019 a left lumpectomy specimen, new posterior margin, additional superior margin, path was neg for residual DCIS. The patient completed adjuvant radiation therapy on 6/6/2019. She was started on Arimidex on 6/18/2019. The patient denies any side effects from the Arimidex, she is feeling well in general, she has chronic back pain. No new bony pain. No new breast changes. Review of Systems;  CONSTITUTIONAL: No fever, chills. Good energy level. She lost weight, which she attributes to eating less but her appetite is good. ENMT: Eyes: No diplopia; Nose: No epistaxis. Mouth: No sore throat. RESPIRATORY: No hemoptysis, shortness of breath, cough. CARDIOVASCULAR: No chest pain, palpitations. GASTROINTESTINAL: No nausea/vomiting, abdominal pain, diarrhea/constipation. GENITOURINARY: No dysuria, urinary frequency, hematuria. NEURO: No syncope, presyncope, headache. MSK: pos for chronic joints pain.   Remainder:  ROS NEGATIVE    Past Medical No    Sexual activity: Not Currently   Lifestyle    Physical activity     Days per week: Not on file     Minutes per session: Not on file    Stress: Not on file   Relationships    Social connections     Talks on phone: Not on file     Gets together: Not on file     Attends Voodoo service: Not on file     Active member of club or organization: Not on file     Attends meetings of clubs or organizations: Not on file     Relationship status: Not on file    Intimate partner violence     Fear of current or ex partner: Not on file     Emotionally abused: Not on file     Physically abused: Not on file     Forced sexual activity: Not on file   Other Topics Concern    Not on file   Social History Narrative    Yohan Vera lives in [place] alone - retired from [employer]. Allergies: Allergies   Allergen Reactions    Poultry Meal Hives    Hydrocodone-Acetaminophen Rash    Molds & Smuts Other (See Comments)    Pcn [Penicillins] Rash     OB/GYN:  Age of menarche was 8. Age of menopause was 61. Last menstrual period was age 61. Patient denies hormonal therapy. Patient is     Physical Exam:  Pulse 87   Temp 98.2 °F (36.8 °C)   Wt 140 lb 8 oz (63.7 kg)   LMP  (LMP Unknown)   SpO2 99%   BMI 29.36 kg/m²     GENERAL: Alert, oriented x 3, not in acute distress. HEENT: PERRLA; EOMI. Oropharynx clear. NECK: Supple. No palpable cervical or supraclavicular lymphadenopathy. LUNGS: Good air entry bilaterally. No wheezing, crackles or rhonchi. BREASTS: Right breast exam is negative for any skin changes, no nipple discharge, no palpable masses, no palpable axillary lymphadenopathy. The left breast exam is negative for any nipple discharge, she has a scar from her lumpectomy, no palpable masses, no palpable left axillary lymphadenopathy. CARDIOVASCULAR: Regular rate. No murmurs, rubs or gallops. ABDOMEN: Soft. Non-tender, non-distended. Positive bowel sounds. EXTREMITIES: Without clubbing, cyanosis, or edema. NEUROLOGIC: No focal deficits. ECOG PS 1    Impression/Plan:      The patient is a pleasant 76 y.o. lady, with a past medical history significant for hyperlipidemia, and hypertension, who had presented with an abnormal screening Mammogram, she was diagnosed with a left breast DCIS, she is s/p Left breast needle localized lumpectomy, the DCIS is high grade with central necrosis, final margins negative, final pathologic stage is pTis NX MX, satge 0 disease, ER positive > 90% NM positive, 90%. I discussed with the patient her diagnosis, characteristics of her DCIS, risk of disease recurrence, the increased risk of having an invasive breast cancer in the ipsilateral and contralateral breast. The patient has an ER positive disease I recommended endocrine therapy with an aromatase inhibitor, we discussed the risks and benefits of Arimidex.  I had discussed with the patient the side effects of Arimidex, including but not limited to mood changes, hot flashes, joints pain, osteoporosis. She completed adjuvant radiation therapy on 6/6/2019, . She was started on Arimidex on 6/18/2019, she is tolerating it well overall, continue Arimidex. The patient had a DEXA scan done on 6/9/2020, revealing a normal bone density, will be due for repeat DEXA scan in June 2021. Continue calcium and vitamin D.        I discussed with her the importance of healthy lifestyle, monthly breast self-examination, as well as routine screening mammograms, she had bilateral screening mammogram done on 12/13/2019, there was no mammographic evidence of malignancy, she is scheduled for a repeat screening mammogram on 12/14/2020. I reviewed with the patient surveillance guidelines. Continue with surveillance. RTC in 3 months. Thank you for allowing us to participate in the care of Mrs. Staley.     [address], MD   HEMATOLOGY/MEDICAL [address] [facility] [address]  [address] [facility] MED ONCOLOGY  [address] [place] [address]  Dept: [phone]

## 2020-11-20 ENCOUNTER — OFFICE VISIT (OUTPATIENT)
Dept: FAMILY MEDICINE CLINIC | Age: 75
End: 2020-11-20
Payer: MEDICARE

## 2020-11-20 VITALS
WEIGHT: 140 LBS | HEIGHT: 58 IN | SYSTOLIC BLOOD PRESSURE: 145 MMHG | TEMPERATURE: 97.4 F | DIASTOLIC BLOOD PRESSURE: 82 MMHG | BODY MASS INDEX: 29.39 KG/M2 | HEART RATE: 72 BPM

## 2020-11-20 DIAGNOSIS — Z11.59 NEED FOR HEPATITIS C SCREENING TEST: ICD-10-CM

## 2020-11-20 PROBLEM — Z91.018 ALLERGY TO CHICKEN MEAT: Status: ACTIVE | Noted: 2020-11-20

## 2020-11-20 PROCEDURE — 99214 OFFICE O/P EST MOD 30 MIN: CPT | Performed by: FAMILY MEDICINE

## 2020-11-20 PROCEDURE — 36415 COLL VENOUS BLD VENIPUNCTURE: CPT | Performed by: FAMILY MEDICINE

## 2020-11-20 ASSESSMENT — ENCOUNTER SYMPTOMS
WHEEZING: 0
NAUSEA: 0
SHORTNESS OF BREATH: 0
EYE PAIN: 0
VOMITING: 0
ABDOMINAL PAIN: 0
DIARRHEA: 0
CONSTIPATION: 0

## 2020-11-20 NOTE — PROGRESS NOTES
[facility]  Department of Family Medicine  Family Medicine Residency Program      Patient:  Silvestre Bradley 76 y.o. female  Date of Service: 11/20/20      Chief complaint:   Chief Complaint   Patient presents with    3 Month Follow-Up     HTN          History ofPresent Illness   Silvestre Bradley is a 76 y.o. female who presents to the clinic with complaints as above.     HTN  Her pressures are usually 130s  Pulse is usually in the 60s  Hasn't been taking her BP regularly for a little while now  On chlorthalidone and losartan  Hasn't had any high or low pressure symptoms; no dizziness, shakiness, headaches, vision changes  States her BP might be high today because she hasn't had a lot of social interaction lately  She will bring her BP cuff to her next appointment     Health Maintenance  She just mailed in her Cologuard test recently  Will let her know the results when it comes back  Hep C screen done today  Educated on why we screen for Hep C    Allergy  She is allergic to chicken  Wondering if there is any way she can eat it again  Hasn't had it in 20 years  She gets a rash, dizzy, and feels hot when she eats chicken  Discussed allergic reaction  Patient declines referral to allergist at this time and will continue to not eat chicken    Past Medical History:      Diagnosis Date    Cancer ([address] 2019    breast    Hyperlipidemia     Hypertension     Pneumonia 1953    Shingles        Past Surgical History:        Procedure Laterality Date    BREAST BIOPSY Left 2/27/2019    LEFT BREAST NEEDLE LOCALIZED  LUMPECTOMY  WITH BIOZORB  PLACEMENT --TRIDENT performed by Bernett Mortimer, MD at [facility] LUMPECTOMY Left 3/26/2019    LEFT BREAST LUMPECTOMY RE-EXCISION ANTERIOR LATERAL SUPERIOR & POSTERIOR MARGINS , REPLACEMENT OF BIOZORB performed by Bernett Mortimer, MD at [facility]       Allergies: leukemia       Medication List:    Current Outpatient Medications   Medication Sig Dispense Refill    anastrozole (ARIMIDEX) 1 MG tablet Take 1 tablet by mouth daily 30 tablet 1    potassium chloride (KLOR-CON M) 20 MEQ extended release tablet TAKE 2 TABLETS BY MOUTH DAILY 60 tablet 5    chlorthalidone (HYGROTON) 25 MG tablet TAKE 1 TABLET BY MOUTH DAILY 90 tablet 1    losartan (COZAAR) 25 MG tablet Take 1 tablet by mouth daily 90 tablet 1    atorvastatin (LIPITOR) 40 MG tablet Take 1 tablet by mouth daily 90 tablet 1    NONFORMULARY 1 tablet 2 times daily Glucosamine      calcium carbonate-vitamin D (CALCIUM 600 + D) 600-400 MG-UNIT TABS per tab Take 1 tablet by mouth 2 times daily 180 tablet 2    cetirizine (ZYRTEC) 10 MG tablet Take 10 mg by mouth daily      ibuprofen (ADVIL MIGRAINE) 200 MG CAPS Take 1 capsule by mouth as needed for Pain      Multiple Vitamins-Minerals (EYE VITAMINS) CAPS Take by mouth 2 times daily       No current facility-administered medications for this visit. Review of Systems:   Review of Systems   Constitutional: Negative for chills and fever. Eyes: Negative for pain and visual disturbance. Respiratory: Negative for shortness of breath and wheezing. Cardiovascular: Negative for chest pain and palpitations. Gastrointestinal: Negative for abdominal pain, constipation, diarrhea, nausea and vomiting. Musculoskeletal: Positive for arthralgias (knee pain) and neck stiffness. Chronic, from cross-stitch posture   Skin: Negative for pallor and rash. Allergic/Immunologic: Positive for environmental allergies and food allergies (poultry). Neurological: Negative for dizziness and headaches. Psychiatric/Behavioral: Negative for dysphoric mood. The patient is not nervous/anxious.          Physical Exam   Vitals: BP (!) 145/82 (Site: Right Upper Arm)   Pulse 72   Temp 97.4 °F (36.3 °C) (Temporal)   Ht 4' 10\" (1.473 m)   Wt 140 lb (63.5 kg)   LMP  (LMP Unknown)   BMI 29.26 kg/m²   Physical Exam  Vitals signs reviewed. Constitutional:       General: She is not in acute distress. Appearance: Normal appearance. HENT:      Head: Normocephalic and atraumatic. Nose: Nose normal. No congestion. Eyes:      General:         Right eye: No discharge. Left eye: No discharge. Neck:      Musculoskeletal: Muscular tenderness present. No neck rigidity. Cardiovascular:      Rate and Rhythm: Normal rate and regular rhythm. Heart sounds: Normal heart sounds. No murmur. Pulmonary:      Effort: Pulmonary effort is normal.      Breath sounds: Normal breath sounds. No wheezing. Abdominal:      General: Abdomen is flat. Bowel sounds are normal.      Palpations: Abdomen is soft. Tenderness: There is no abdominal tenderness. Musculoskeletal:      Right lower leg: Edema (trace) present. Left lower leg: Edema (trace) present. Skin:     General: Skin is warm and dry. Neurological:      General: No focal deficit present. Mental Status: She is alert and oriented to person, place, and time. Psychiatric:         Mood and Affect: Mood normal.         Behavior: Behavior normal.         Assessment and Plan     1. Essential hypertension  Patient normally under 916 systolic at home  Elevated today, possibly due to social interaction, which patient hasn't had in a while due to the pandemic  Will continue on current meds  She is to bring her BP cuff to next visit to compare with ours  Follow up in 3 months for BP check    2. 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 Need for hepatitis C screening test  - HEPATITIS C ANTIBODY; Future      Counseled regarding above diagnosis, including possible risks and complications, especially if left uncontrolled.  Counseled regarding the possible side effects, risks, benefits and alternatives to treatment; patient and/or guardian verbalizes understanding, agrees, feels comfortable with, and wishes to proceed with above treatment plan. Call or go to ED immediately if symptoms worsen or persist. Advised patient to call with any new medication issues and, as applicable, read all Rx info from pharmacy to assure aware of all possible risks and side effects of medication before taking. Patient and/or guardian given opportunity to ask questions/raise concerns. The patient verbalized comfort and understanding ofinstructions. I encourage further reading and education about your health conditions. Information on many health conditions is provided by [facility] [employer]: [email or web address]  Please bring any questions to me at your next visit. Return to Office: Return in about 3 months (around 2/20/2021) for BP check.     Chaz Kruse, DO

## 2020-11-20 NOTE — PROGRESS NOTES
[address] [facility]  Precepting Note    Subjective:  F/u of HTN  Controlled  at home  Asymptomatic    ROS otherwise negative     Past medical, surgical, family and social history were reviewed, non-contributory, and unchanged unless otherwise stated. Objective:    BP (!) 145/82 (Site: Right Upper Arm)   Pulse 72   Temp 97.4 °F (36.3 °C) (Temporal)   Ht 4' 10\" (1.473 m)   Wt 140 lb (63.5 kg)   LMP  (LMP Unknown)   BMI 29.26 kg/m²     Exam is as noted by resident with the following changes, additions or corrections:    General:  NAD; alert & oriented x 3   Heart:  RRR, no murmurs, gallops, or rubs. Lungs:  CTA bilaterally, no wheeze, rales or rhonchi  Abd: bowel sounds present, nontender, nondistended, no masses  Extrem:  No clubbing, cyanosis, or edema    Assessment/Plan:  HTN  Monitor BP for now. BP check at home and call with readings   HM: update     Attending Physician Statement  I have reviewed the chart, including any radiology or labs. I have discussed the case, including pertinent history and exam findings with the resident. I agree with the assessment, plan and orders as documented by the resident. Please refer to the resident note for additional information.       Electronically signed by Adelfo Orozco MD on 11/20/2020 at 11:13 AM

## 2020-11-23 LAB — HEPATITIS C ANTIBODY INTERPRETATION: NORMAL

## 2020-11-27 RX ORDER — POTASSIUM CHLORIDE 20 MEQ/1
TABLET, EXTENDED RELEASE ORAL
Qty: 90 TABLET | Refills: 1 | Status: SHIPPED
Start: 2020-11-27 | End: 2021-06-22 | Stop reason: SDUPTHER

## 2020-11-27 RX ORDER — ATORVASTATIN CALCIUM 40 MG/1
40 TABLET, FILM COATED ORAL DAILY
Qty: 90 TABLET | Refills: 1 | Status: SHIPPED
Start: 2020-11-27 | End: 2021-06-16 | Stop reason: SDUPTHER

## 2020-11-27 RX ORDER — CHLORTHALIDONE 25 MG/1
25 TABLET ORAL DAILY
Qty: 90 TABLET | Refills: 1 | Status: SHIPPED
Start: 2020-11-27 | End: 2021-07-15 | Stop reason: SDUPTHER

## 2020-11-27 RX ORDER — LOSARTAN POTASSIUM 25 MG/1
25 TABLET ORAL DAILY
Qty: 90 TABLET | Refills: 1 | Status: SHIPPED
Start: 2020-11-27 | End: 2021-06-22

## 2020-12-04 RX ORDER — ANASTROZOLE 1 MG/1
1 TABLET ORAL DAILY
Qty: 30 TABLET | Refills: 1 | Status: SHIPPED | OUTPATIENT
Start: 2020-12-04 | End: 2021-03-05 | Stop reason: SDUPTHER

## 2020-12-04 NOTE — TELEPHONE ENCOUNTER
Pt requested refill on Calcium and Anastrozole. Order pended and routed to provider for review and authorization.

## 2020-12-07 ASSESSMENT — ENCOUNTER SYMPTOMS
SORE THROAT: 0
COUGH: 0
BLOOD IN STOOL: 0
SINUS PAIN: 0
EYE ITCHING: 0
VOICE CHANGE: 0
TROUBLE SWALLOWING: 0
SINUS PRESSURE: 0
ABDOMINAL DISTENTION: 0
RHINORRHEA: 0
CONSTIPATION: 0
BACK PAIN: 0
SHORTNESS OF BREATH: 0
CHEST TIGHTNESS: 0
DIARRHEA: 0
VOMITING: 0
NAUSEA: 0
CHOKING: 0
ABDOMINAL PAIN: 0
EYE DISCHARGE: 0
WHEEZING: 0

## 2020-12-07 NOTE — PROGRESS NOTES
Subjective:      Patient ID: Deann Olvera is a 76 y.o. female. HPI   The patient is a pleasant 76 y.o. lady, with a past medical history significant for hyperlipidemia, and hypertension, who had presented with an abnormal screening Mammogram:  MAMMOGRAM (12/11/2018): INDICATION:   Screening.       HISTORY:   The patient has no personal history of cancer. The patient has the following family history of breast cancer:  maternal aunt.       MAMMOGRAM VIEWS:   The following mammographic views where obtained: bilateral craniocaudal; bilateral craniocaudal with tomosynthesis; bilateral mediolateral oblique; and bilateral mediolateral oblique with tomosynthesis       TOMOSYNTHESIS:   Tomosynthesis (3 Dimensional Breast Imaging) was used on this examination to aid in evaluation.       COMPARISON:   No prior imaging studies are available for comparison.       CAD:   This exam was reviewed using the cloudControl Computer Aided Detection (CAD)       TISSUE DENSITY:   The breasts are heterogeneously dense (Type 3 density).       MAMMOGRAM FINDINGS:   In the right breast, no suspicious masses, areas of suspicious architectural distortion, suspicious calcifications, or additional suspicious findings are identified.           Finding 1:   There are coarse heterogeneous and fine pleomorphic calcifications measuring 17 mm seen in the upper outer quadrant of the left breast.       IMPRESSION:   Calcifications in the left breast require additional evaluation.    Spot magnification views with consideration for a stereotactic biopsy if needed.       =======================================   BI-RADS Category 0:  Incomplete: Need Additional Imaging Evaluation   =======================================       RISK ASSESSMENT:   During this patient's visit, information obtained was used to generate a lifetime risk assessment using the Tyrer-Cuzick model (also called the BRENNON [International Breast Cancer Intervention Study] Breast Cancer Risk Evaluation Tool).      LIFETIME RISK    Patient has a Tyrer Cuzick score of 7.9%    BREAST TISSUE DENSITY    Heterogeneously dense       -AVERAGE RISK (<15% )   Yearly screening mammograms starting at age 36 and older. Regardless of breast density, tomosynthesis is suggested. Monthly self-breast exams are recommended for women age 27 and older.       NOTE:   Mammography is not 100% accurate in the detection of breast cancer.  Accuracy decreases as mammographic density of the breast increases.  A negative mammogram should not deter further evaluation (including biopsy) of suspicious physical findings.     Recommendations are based on NCCN ([employer]) and ACR [employer] of Radiology) guidelines.       Thank you for sending your patient to this ACR and FDA approved facility. If there are any physician concerns regarding this report, a Radiologist can be reached by calling the following number [phone].       Follow up Protocol for the [facility]:   BI-RADS 1 or 2: Sharon Nunez will send a reminder when next mammogram is due. BI-RADS 3: Sharon Nunez will send a reminder for recommended short term follow up. BI-RADS 0, 4 or 5: Sharon Nunez will contact patient to schedule all additional views and procedures.      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  Detached fragments of carcinoma  without associated stroma are also present.  Microcalcifications are  noted.  Intradepartmental consultation is obtained.     Breast Cancer Marker Studies:  Estrogen Receptors (ER):  Positive:         Percentage of cells positive:  >90%         Intensity:  Strong    Progesterone Receptors (ND):  Positive:         Percentage of cells positive:  90%         Intensity:  Moderate to strong     The patient underwent on 1/18/2019 a Left breast needle localized lumpectomy, pathology:  CANCER CASE SUMMARY  Procedure-excision  Specimen laterality-left  Size (extent) of DCIS: 1.3 x 1.2 x 1.2 cm  Histologic type-ductal carcinoma in situ  Nuclear grade-grade 3 (high)  Necrosis-present, central  Margins (part A)-anterior and lateral margins involved by DCIS; DCIS is 1  mm from posterior margin  Margins (part B)- uninvolved by DCIS   Distance from closest margin: 1 mm from superior margin  Regional lymph nodes-no lymph nodes submitted or found  TNM classification (AJCC eighth edition)-pTis  NX MX  Ancillary studies-see prior report [id number] for ER/ [place] results     - 3/26/2019 a left lumpectomy specimen, new posterior margin, additional superior margin, path was neg for residual DCIS.    -RT:  Completed 06/06/2019.  -ET:  Arimidex started 06/18/2019      Past Medical History:   Diagnosis Date    Cancer ([facility] [address] 2019    breast    Hyperlipidemia     Hypertension     Pneumonia 1953    Shingles      Past Surgical History:   Procedure Laterality Date    BREAST BIOPSY Left 2/27/2019    LEFT BREAST NEEDLE LOCALIZED  LUMPECTOMY  WITH BIOZORB  PLACEMENT --TRIDENT performed by Dale [facility] MD at [facility] LUMPECTOMY Left 3/26/2019    LEFT BREAST LUMPECTOMY RE-EXCISION ANTERIOR LATERAL SUPERIOR & POSTERIOR MARGINS , REPLACEMENT OF BIOZORB performed by [facility]     Social History     Tobacco Use    Smoking status: Never Smoker    Smokeless tobacco: Never Used   Substance Use Topics    Alcohol use:  Yes     Alcohol/week: 1.0 standard drinks     Types: 1 Glasses of wine per week     Frequency: Monthly or less     Drinks per session: 1 or 2     Binge frequency: Never     Comment: occasionally    Drug use: No     Allergies   Allergen Reactions    Poultry Meal Hives    Hydrocodone-Acetaminophen Rash    Molds & Smuts Other (See Comments)    Pcn [Penicillins] Rash     Current Outpatient Medications on File Prior to Visit   Medication Sig Dispense Refill    anastrozole (ARIMIDEX) 1 MG tablet Take 1 tablet by mouth daily 30 tablet 1    calcium carbonate-vitamin D (CALCIUM 600 + D) 600-400 MG-UNIT TABS per tab Take 1 tablet by mouth 2 times daily 180 tablet 2    atorvastatin (LIPITOR) 40 MG tablet Take 1 tablet by mouth daily 90 tablet 1    chlorthalidone (HYGROTON) 25 MG tablet Take 1 tablet by mouth daily 90 tablet 1    losartan (COZAAR) 25 MG tablet Take 1 tablet by mouth daily 90 tablet 1    potassium chloride (KLOR-CON M) 20 MEQ extended release tablet Take one tablet by mouth daily at the same time as your chlorthalidone (or Hygroton) pill. 90 tablet 1    NONFORMULARY 1 tablet 2 times daily Glucosamine      cetirizine (ZYRTEC) 10 MG tablet Take 10 mg by mouth daily      ibuprofen (ADVIL MIGRAINE) 200 MG CAPS Take 1 capsule by mouth as needed for Pain      Multiple Vitamins-Minerals (EYE VITAMINS) CAPS Take by mouth 2 times daily       No current facility-administered medications on file prior to visit. Review of Systems   Constitutional: Negative for activity change, appetite change, chills, fatigue, fever and unexpected weight change. She continues to do well. Doing well. Scattered arthralgias, not interfering with her quality of life. HENT: Negative for congestion, postnasal drip, rhinorrhea, sinus pressure, sinus pain, sore throat, trouble swallowing and voice change. Eyes: Negative for discharge, itching and visual disturbance. Respiratory: Negative for cough, choking, chest tightness, shortness of breath and wheezing. Cardiovascular: Negative for chest pain, palpitations and leg swelling.    Gastrointestinal: Negative for abdominal distention, abdominal pain, blood in stool, constipation, diarrhea, nausea and vomiting. Endocrine: Negative for cold intolerance and heat intolerance. Genitourinary: Negative for difficulty urinating, dysuria, frequency and hematuria. Musculoskeletal: Negative for arthralgias, back pain, gait problem, joint swelling, myalgias, neck pain and neck stiffness. Allergic/Immunologic: Negative for environmental allergies and food allergies. Neurological: Negative for dizziness, seizures, syncope, speech difficulty, weakness, light-headedness and headaches. Hematological: Negative for adenopathy. Does not bruise/bleed easily. Psychiatric/Behavioral: Negative for agitation, confusion and decreased concentration. The patient is not nervous/anxious. Objective:   Physical Exam  Vitals signs and nursing note reviewed. Constitutional:       General: She is not in acute distress. Appearance: Normal appearance. She is well-developed. She is not diaphoretic. Comments: ECOG Remains stable 0; pleasant and cooperative. HENT:      Head: Normocephalic and atraumatic. Mouth/Throat:      Pharynx: No oropharyngeal exudate. Eyes:      General: No scleral icterus. Right eye: No discharge. Left eye: No discharge. Conjunctiva/sclera: Conjunctivae normal.   Neck:      Musculoskeletal: Normal range of motion and neck supple. Thyroid: No thyromegaly. Vascular: No JVD. Trachea: No tracheal deviation. Cardiovascular:      Rate and Rhythm: Normal rate and regular rhythm. Heart sounds: No murmur. No friction rub. No gallop. Pulmonary:      Effort: Pulmonary effort is normal. No respiratory distress or retractions. Breath sounds: Normal breath sounds. No stridor. No wheezing or rales. Chest:      Chest wall: No mass, lacerations, deformity, swelling, tenderness or edema. Breasts: Breasts are asymmetrical (Left breast smaller than right). Right: No inverted nipple, mass, nipple discharge, skin change or tenderness. positive:  >90%         Intensity:  Strong    Progesterone Receptors (RI):  Positive:         Percentage of cells positive:  90%         Intensity:  Moderate to strong     The patient underwent on 1/18/2019 a Left breast needle localized lumpectomy, pathology:  CANCER CASE SUMMARY  Procedure-excision  Specimen laterality-left  Size (extent) of DCIS: 1.3 x 1.2 x 1.2 cm  Histologic type-ductal carcinoma in situ  Nuclear grade-grade 3 (high)  Necrosis-present, central  Margins (part A)-anterior and lateral margins involved by DCIS; DCIS is 1  mm from posterior margin  Margins (part B)- uninvolved by DCIS   Distance from closest margin: 1 mm from superior margin  Regional lymph nodes-no lymph nodes submitted or found  TNM classification (AJCC eighth edition)-pTis  NX MX  Ancillary studies-see prior report [facility]  for ER/ [place] results     - 3/26/2019 a left lumpectomy specimen, new posterior margin, additional superior margin, path was neg for residual DCIS.    -RT:  Completed 06/06/2019.    -ET:  Arimidex started 06/18/2019 with continued good tolerance to date. Minor scattered arthralgias, they are overall stable and not interfering with her quality of life. On Glucosamine supplement. -DEXA bone density 05/09/2020: Normal.  On Ca+/Vit D daily. -B/L screening mammogram today, 12/14/2020:  Benign, BI-RADS 2. Clinically, she is doing well and is without evidence of recurrent disease. Plan:   1. Continue monthly breast/chest wall self examination; detailed instructions reviewed today. Bring any changes to your physician's attention. 2. Continue healthy diet and exercise routinely as tolerated. 3. Maintaining ideal body weight (20-25 BMI) may lead to optimal breast cancer outcomes. 4. Avoid alcohol. 5. Repeat mammogram December 2021 (not ordered). 6. Continue rimidex 1 mg daily at the discretion of medical oncology team.  7. Ca+/VitD while on Arimidex. 8. Repeat DEXA:June 2021 ( ordered). 9. Continue follow up with Medical Oncology and Primary Care. 10. RTC 6 months. During today's visit, face-to-face time 15 minutes, greater than 50% in counseling education and coordination of care. All questions were answered to her apparent satisfaction, and she is agreeable to the plan as outlined above. Amy Fang, RN, MSN, APRN-CNP, [address] [place]  Advanced Oncology Certified Nurse Practitioner  Department of Breast Surgery  [facility]/  [facility] in collaboration with Dr. Gatito Smith.  Ashley/Stacy Loera

## 2020-12-14 ENCOUNTER — OFFICE VISIT (OUTPATIENT)
Dept: BREAST CENTER | Age: 75
End: 2020-12-14
Payer: MEDICARE

## 2020-12-14 ENCOUNTER — HOSPITAL ENCOUNTER (OUTPATIENT)
Dept: GENERAL RADIOLOGY | Age: 75
Discharge: HOME | End: 2020-12-16
Payer: MEDICARE

## 2020-12-14 VITALS
WEIGHT: 140 LBS | HEIGHT: 58 IN | BODY MASS INDEX: 29.39 KG/M2 | DIASTOLIC BLOOD PRESSURE: 60 MMHG | SYSTOLIC BLOOD PRESSURE: 124 MMHG | OXYGEN SATURATION: 99 % | HEART RATE: 70 BPM | RESPIRATION RATE: 18 BRPM | TEMPERATURE: 97 F

## 2020-12-14 PROCEDURE — 77063 BREAST TOMOSYNTHESIS BI: CPT

## 2020-12-14 PROCEDURE — 99213 OFFICE O/P EST LOW 20 MIN: CPT | Performed by: NURSE PRACTITIONER

## 2020-12-14 NOTE — PATIENT INSTRUCTIONS

## 2020-12-15 RX ORDER — CHLORTHALIDONE 25 MG/1
25 TABLET ORAL DAILY
Qty: 90 TABLET | Refills: 1 | OUTPATIENT
Start: 2020-12-15

## 2020-12-15 RX ORDER — ATORVASTATIN CALCIUM 40 MG/1
40 TABLET, FILM COATED ORAL DAILY
Qty: 90 TABLET | Refills: 1 | OUTPATIENT
Start: 2020-12-15

## 2020-12-15 RX ORDER — LOSARTAN POTASSIUM 25 MG/1
25 TABLET ORAL DAILY
Qty: 90 TABLET | Refills: 1 | OUTPATIENT
Start: 2020-12-15

## 2021-02-02 ENCOUNTER — IMMUNIZATION (OUTPATIENT)
Dept: PRIMARY CARE CLINIC | Age: 76
End: 2021-02-02
Payer: MEDICARE

## 2021-02-02 DIAGNOSIS — Z23 NEED FOR VACCINATION: Primary | ICD-10-CM

## 2021-02-02 PROCEDURE — 0001A COVID-19, PFIZER VACCINE 30MCG/0.3ML DOSE: CPT | Performed by: CLINICAL NURSE SPECIALIST

## 2021-02-02 PROCEDURE — 91300 COVID-19, PFIZER VACCINE 30MCG/0.3ML DOSE: CPT | Performed by: CLINICAL NURSE SPECIALIST

## 2021-02-23 ENCOUNTER — IMMUNIZATION (OUTPATIENT)
Dept: PRIMARY CARE CLINIC | Age: 76
End: 2021-02-23
Payer: MEDICARE

## 2021-02-23 PROCEDURE — 0002A COVID-19, PFIZER VACCINE 30MCG/0.3ML DOSE: CPT | Performed by: CLINICAL NURSE SPECIALIST

## 2021-02-23 PROCEDURE — 91300 COVID-19, PFIZER VACCINE 30MCG/0.3ML DOSE: CPT | Performed by: CLINICAL NURSE SPECIALIST

## 2021-03-02 DIAGNOSIS — D05.12 DUCTAL CARCINOMA IN SITU (DCIS) OF LEFT BREAST: Primary | ICD-10-CM

## 2021-03-03 ENCOUNTER — NURSE ONLY (OUTPATIENT)
Dept: FAMILY MEDICINE CLINIC | Age: 76
End: 2021-03-03

## 2021-03-03 ENCOUNTER — TELEPHONE (OUTPATIENT)
Dept: FAMILY MEDICINE CLINIC | Age: 76
End: 2021-03-03

## 2021-03-03 VITALS — DIASTOLIC BLOOD PRESSURE: 61 MMHG | HEART RATE: 66 BPM | SYSTOLIC BLOOD PRESSURE: 136 MMHG

## 2021-03-03 NOTE — TELEPHONE ENCOUNTER
Patient came into the office today for a BP check -   (there was a mistake in scheduling and was put on lab schedule for a 3 mo BP check) will schedule pt f/u appt.     1st BP:  146/62  HR: 66    Had patient sit for approx 5 minutes    2nd BP:  136 / 61  HR: 66       Patient denies any HA, CP, SOB or vision changes

## 2021-03-05 ENCOUNTER — OFFICE VISIT (OUTPATIENT)
Dept: ONCOLOGY | Age: 76
End: 2021-03-05
Payer: MEDICARE

## 2021-03-05 ENCOUNTER — HOSPITAL ENCOUNTER (OUTPATIENT)
Dept: INFUSION THERAPY | Age: 76
Discharge: HOME | End: 2021-03-05
Payer: MEDICARE

## 2021-03-05 VITALS
HEIGHT: 58 IN | DIASTOLIC BLOOD PRESSURE: 68 MMHG | TEMPERATURE: 97.4 F | OXYGEN SATURATION: 99 % | BODY MASS INDEX: 29.6 KG/M2 | HEART RATE: 72 BPM | SYSTOLIC BLOOD PRESSURE: 157 MMHG | WEIGHT: 141 LBS

## 2021-03-05 DIAGNOSIS — D05.12 DUCTAL CARCINOMA IN SITU (DCIS) OF LEFT BREAST: ICD-10-CM

## 2021-03-05 DIAGNOSIS — D64.9 ANEMIA, UNSPECIFIED TYPE: ICD-10-CM

## 2021-03-05 DIAGNOSIS — D64.9 ANEMIA, UNSPECIFIED TYPE: Primary | ICD-10-CM

## 2021-03-05 LAB
ALBUMIN SERPL-MCNC: 3.9 G/DL (ref 3.5–5.2)
ALP BLD-CCNC: 100 U/L (ref 35–104)
ALT SERPL-CCNC: 12 U/L (ref 0–32)
ANION GAP SERPL CALCULATED.3IONS-SCNC: 9 MMOL/L (ref 7–16)
AST SERPL-CCNC: 20 U/L (ref 0–31)
BASOPHILS ABSOLUTE: 0.06 E9/L (ref 0–0.2)
BASOPHILS RELATIVE PERCENT: 1.2 % (ref 0–2)
BILIRUB SERPL-MCNC: 0.4 MG/DL (ref 0–1.2)
BUN BLDV-MCNC: 16 MG/DL (ref 8–23)
CALCIUM SERPL-MCNC: 9.9 MG/DL (ref 8.6–10.2)
CHLORIDE BLD-SCNC: 102 MMOL/L (ref 98–107)
CO2: 28 MMOL/L (ref 22–29)
CREAT SERPL-MCNC: 0.8 MG/DL (ref 0.5–1)
DAT POLYSPECIFIC: NORMAL
EOSINOPHILS ABSOLUTE: 0.1 E9/L (ref 0.05–0.5)
EOSINOPHILS RELATIVE PERCENT: 2 % (ref 0–6)
FERRITIN: 199 NG/ML
FOLATE: >20 NG/ML (ref 4.8–24.2)
GFR AFRICAN AMERICAN: >60
GFR NON-AFRICAN AMERICAN: >60 ML/MIN/1.73
GLUCOSE BLD-MCNC: 135 MG/DL (ref 74–99)
HCT VFR BLD CALC: 33.8 % (ref 34–48)
HCT VFR BLD CALC: 34.2 % (ref 34–48)
HEMOGLOBIN: 11.4 G/DL (ref 11.5–15.5)
IMMATURE GRANULOCYTES #: 0.02 E9/L
IMMATURE GRANULOCYTES %: 0.4 % (ref 0–5)
IMMATURE RETIC FRACT: 5.3 % (ref 3–15.9)
IRON SATURATION: 19 % (ref 15–50)
IRON: 42 MCG/DL (ref 37–145)
LYMPHOCYTES ABSOLUTE: 1.33 E9/L (ref 1.5–4)
LYMPHOCYTES RELATIVE PERCENT: 26.2 % (ref 20–42)
MCH RBC QN AUTO: 28.2 PG (ref 26–35)
MCHC RBC AUTO-ENTMCNC: 33.3 % (ref 32–34.5)
MCV RBC AUTO: 84.7 FL (ref 80–99.9)
MONOCYTES ABSOLUTE: 0.67 E9/L (ref 0.1–0.95)
MONOCYTES RELATIVE PERCENT: 13.2 % (ref 2–12)
NEUTROPHILS ABSOLUTE: 2.9 E9/L (ref 1.8–7.3)
NEUTROPHILS RELATIVE PERCENT: 57 % (ref 43–80)
PDW BLD-RTO: 14.2 FL (ref 11.5–15)
PLATELET # BLD: 201 E9/L (ref 130–450)
PMV BLD AUTO: 11.5 FL (ref 7–12)
POTASSIUM SERPL-SCNC: 3.8 MMOL/L (ref 3.5–5)
RBC # BLD: 4.04 E12/L (ref 3.5–5.5)
RETIC HGB EQUIVALENT: 32.9 PG (ref 28.2–36.6)
RETICULOCYTE ABSOLUTE COUNT: 0.05 E12/L
RETICULOCYTE COUNT PCT: 1.2 % (ref 0.4–1.9)
SODIUM BLD-SCNC: 139 MMOL/L (ref 132–146)
TOTAL IRON BINDING CAPACITY: 218 MCG/DL (ref 250–450)
TOTAL PROTEIN: 7.6 G/DL (ref 6.4–8.3)
TSH SERPL DL<=0.05 MIU/L-ACNC: 2.25 UIU/ML (ref 0.27–4.2)
VITAMIN B-12: 883 PG/ML (ref 211–946)
WBC # BLD: 5.1 E9/L (ref 4.5–11.5)

## 2021-03-05 PROCEDURE — 80053 COMPREHEN METABOLIC PANEL: CPT

## 2021-03-05 PROCEDURE — 85025 COMPLETE CBC W/AUTO DIFF WBC: CPT

## 2021-03-05 PROCEDURE — 99212 OFFICE O/P EST SF 10 MIN: CPT

## 2021-03-05 PROCEDURE — 36415 COLL VENOUS BLD VENIPUNCTURE: CPT

## 2021-03-05 PROCEDURE — 99214 OFFICE O/P EST MOD 30 MIN: CPT | Performed by: INTERNAL MEDICINE

## 2021-03-05 RX ORDER — ANASTROZOLE 1 MG/1
1 TABLET ORAL DAILY
Qty: 90 TABLET | Refills: 2 | Status: SHIPPED
Start: 2021-03-05 | End: 2021-07-16 | Stop reason: SDUPTHER

## 2021-03-05 NOTE — PROGRESS NOTES
[address] [facility]COLOG[facility]  [address] [place] [id number]  Dept: [phone]  Attending Progress note      Reason for Visit:   Left Breast DCIS. Referring Physician:  Michaelle Hi MD.    PCP:  Shannon Washburn DO    History of Present Illness: The patient is a pleasant 68 y.o. lady, with a past medical history significant for hyperlipidemia, and hypertension, who had presented with an abnormal screening Mammogram:  MAMMOGRAM (12/11/2018): INDICATION:   Screening.       HISTORY:   The patient has no personal history of cancer. The patient has the following family history of breast cancer:  maternal aunt.       MAMMOGRAM VIEWS:   The following mammographic views where obtained: bilateral craniocaudal; bilateral craniocaudal with tomosynthesis; bilateral mediolateral oblique; and bilateral mediolateral oblique with tomosynthesis       TOMOSYNTHESIS:   Tomosynthesis (3 Dimensional Breast Imaging) was used on this examination to aid in evaluation.       COMPARISON:   No prior imaging studies are available for comparison.       CAD:   This exam was reviewed using the [employer] Computer Aided Detection (CAD)       TISSUE DENSITY:   The breasts are heterogeneously dense (Type 3 density).       MAMMOGRAM FINDINGS:   In the right breast, no suspicious masses, areas of suspicious architectural distortion, suspicious calcifications, or additional suspicious findings are identified.           Finding 1:   There are coarse heterogeneous and fine pleomorphic calcifications measuring 17 mm seen in the upper outer quadrant of the left breast.       IMPRESSION:   Calcifications in the left breast require additional evaluation.    Spot magnification views with consideration for a stereotactic biopsy if needed.       =======================================   BI-RADS Category 0:  Incomplete: Need Additional Imaging Evaluation   =======================================       RISK obtained. Breast Cancer Marker Studies:  Estrogen Receptors (ER):  Positive:         Percentage of cells positive:  >90%         Intensity:  Strong    Progesterone Receptors (UT):  Positive:         Percentage of cells positive:  90%         Intensity:  Moderate to strong     The patient underwent on 1/18/2019 a Left breast needle localized lumpectomy, pathology:  CANCER CASE SUMMARY  Procedure-excision  Specimen laterality-left  Size (extent) of DCIS: 1.3 x 1.2 x 1.2 cm  Histologic type-ductal carcinoma in situ  Nuclear grade-grade 3 (high)  Necrosis-present, central  Margins (part A)-anterior and lateral margins involved by DCIS; DCIS is 1  mm from posterior margin  Margins (part B)- uninvolved by DCIS   Distance from closest margin: 1 mm from superior margin  Regional lymph nodes-no lymph nodes submitted or found  TNM classification (AJCC eighth edition)-pTis  NX MX  Ancillary studies-see prior report [facility]  for ER/ [place] results    She underwent on 3/26/2019 a left lumpectomy specimen, new posterior margin, additional superior margin, path was neg for residual DCIS. The patient completed adjuvant radiation therapy on 6/6/2019. She was started on Arimidex on 6/18/2019. The patient denies any side effects from the Arimidex, she is feeling well in general, she has chronic back pain. No new bony pain. No new breast changes. No melena or hematochezia. Review of Systems;  CONSTITUTIONAL: No fever, chills. Good energy level. Weight is stable since last visit. ENMT: Eyes: No diplopia; Nose: No epistaxis. Mouth: No sore throat. RESPIRATORY: No hemoptysis, shortness of breath, cough. CARDIOVASCULAR: No chest pain, palpitations. GASTROINTESTINAL: No nausea/vomiting, abdominal pain, diarrhea/constipation. GENITOURINARY: No dysuria, urinary frequency, hematuria. NEURO: No syncope, presyncope, headache. MSK: pos for chronic joints pain.   Remainder:  ROS NEGATIVE    Past Medical History: occasionally    Drug use: No    Sexual activity: Not Currently   Lifestyle    Physical activity     Days per week: Not on file     Minutes per session: Not on file    Stress: Not on file   Relationships    Social connections     Talks on phone: Not on file     Gets together: Not on file     Attends Restoration service: Not on file     Active member of club or organization: Not on file     Attends meetings of clubs or organizations: Not on file     Relationship status: Not on file    Intimate partner violence     Fear of current or ex partner: Not on file     Emotionally abused: Not on file     Physically abused: Not on file     Forced sexual activity: Not on file   Other Topics Concern    Not on file   Social History Narrative    Destiney Kang lives in [place] alone - retired from [employer]. Allergies: Allergies   Allergen Reactions    Poultry Meal Hives    Hydrocodone-Acetaminophen Rash    Molds & Smuts Other (See Comments)    Pcn [Penicillins] Rash     OB/GYN:  Age of menarche was 8. Age of menopause was 61. Last menstrual period was age 61. Patient denies hormonal therapy. Patient is     Physical Exam:  BP (!) 157/68 (Site: Right Upper Arm, Position: Sitting, Cuff Size: Medium Adult)   Pulse 72   Temp 97.4 °F (36.3 °C) (Temporal)   Ht 4' 10\" (1.473 m)   Wt 141 lb (64 kg)   LMP  (LMP Unknown)   SpO2 99%   BMI 29.47 kg/m²     GENERAL: Alert, oriented x 3, not in acute distress. HEENT: PERRLA; EOMI. Oropharynx clear. NECK: Supple. No palpable cervical or supraclavicular lymphadenopathy. LUNGS: Good air entry bilaterally. No wheezing, crackles or rhonchi. BREASTS: Right breast exam is negative for any skin changes, no nipple discharge, no palpable masses, no palpable axillary lymphadenopathy. The left breast exam is negative for any nipple discharge, she has a scar from her lumpectomy, no palpable masses, no palpable left axillary lymphadenopathy. CARDIOVASCULAR: Regular rate.  No murmurs, rubs or gallops. ABDOMEN: Soft. Non-tender, non-distended. Positive bowel sounds. EXTREMITIES: Without clubbing, cyanosis, or edema. NEUROLOGIC: No focal deficits. ECOG PS 1    Impression/Plan:      The patient is a pleasant 68 y.o. lady, with a past medical history significant for hyperlipidemia, and hypertension, who had presented with an abnormal screening Mammogram, she was diagnosed with a left breast DCIS, she is s/p Left breast needle localized lumpectomy, the DCIS is high grade with central necrosis, final margins negative, final pathologic stage is pTis NX MX, satge 0 disease, ER positive > 90% [place] positive, 90%. I discussed with the patient her diagnosis, characteristics of her DCIS, risk of disease recurrence, the increased risk of having an invasive breast cancer in the ipsilateral and contralateral breast. The patient has an ER positive disease I recommended endocrine therapy with an aromatase inhibitor, we discussed the risks and benefits of Arimidex.  I had discussed with the patient the side effects of Arimidex, including but not limited to mood changes, hot flashes, joints pain, osteoporosis. She completed adjuvant radiation therapy on 6/6/2019, . She was started on Arimidex on 6/18/2019, she is tolerating it well overall, continue Arimidex. The patient had a DEXA scan done on 6/9/2020, revealing a normal bone density, will be due for repeat DEXA scan in June 2021. Continue calcium and vitamin D.        I discussed with her the importance of healthy lifestyle, monthly breast self-examination, as well as routine screening mammograms, she had bilateral screening mammogram done on 4/14/2020, there was no mammographic evidence of malignancy, she is scheduled for a repeat screening mammogram on 4/15/2021. I reviewed with the patient surveillance guidelines. Continue with surveillance.     Mild anemia, normocytic, without significant decrease in the hemoglobin compared with her previous

## 2021-03-08 LAB
ALBUMIN SERPL-MCNC: 3.4 G/DL (ref 3.5–4.7)
ALPHA-1-GLOBULIN: 0.3 G/DL (ref 0.2–0.4)
ALPHA-2-GLOBULIN: 0.7 G/FL (ref 0.5–1)
BETA GLOBULIN: 1 G/DL (ref 0.8–1.3)
ELECTROPHORESIS: ABNORMAL
GAMMA GLOBULIN: 1.6 G/DL (ref 0.7–1.6)
PATHOLOGIST REVIEW: NORMAL
TOTAL PROTEIN: 6.9 G/DL (ref 6.4–8.3)

## 2021-03-25 ENCOUNTER — OFFICE VISIT (OUTPATIENT)
Dept: FAMILY MEDICINE CLINIC | Age: 76
End: 2021-03-25
Payer: MEDICARE

## 2021-03-25 VITALS
DIASTOLIC BLOOD PRESSURE: 66 MMHG | HEIGHT: 58 IN | WEIGHT: 140.4 LBS | SYSTOLIC BLOOD PRESSURE: 135 MMHG | TEMPERATURE: 97.9 F | HEART RATE: 70 BPM | BODY MASS INDEX: 29.47 KG/M2

## 2021-03-25 DIAGNOSIS — M65.312 TRIGGER FINGER OF LEFT THUMB: ICD-10-CM

## 2021-03-25 DIAGNOSIS — I10 ESSENTIAL HYPERTENSION: Primary | ICD-10-CM

## 2021-03-25 PROCEDURE — 99213 OFFICE O/P EST LOW 20 MIN: CPT | Performed by: FAMILY MEDICINE

## 2021-03-25 ASSESSMENT — ENCOUNTER SYMPTOMS
ABDOMINAL PAIN: 0
SHORTNESS OF BREATH: 0
EYE PAIN: 0
NAUSEA: 0
RHINORRHEA: 0
COLOR CHANGE: 0
WHEEZING: 0

## 2021-03-25 ASSESSMENT — PATIENT HEALTH QUESTIONNAIRE - PHQ9
SUM OF ALL RESPONSES TO PHQ QUESTIONS 1-9: 0
SUM OF ALL RESPONSES TO PHQ QUESTIONS 1-9: 0

## 2021-03-25 NOTE — PATIENT INSTRUCTIONS
immediate medical care if:    · Your finger locks in a bent position and will not straighten. Watch closely for changes in your health, and be sure to contact your doctor if:    · You do not get better as expected. Where can you learn more? Go to [email or web address]. org and sign in to your etaskr account. Enter M826 in the Vizsafe box to learn more about \"Trigger Finger: Care Instructions. \"     If you do not have an account, please click on the \"Sign Up Now\" link. Current as of: November 16, 2020               Content Version: 12.8  © 3664-5084 Healthwise, Incorporated. Care instructions adapted under license by [facility] ([facility]). If you have questions about a medical condition or this instruction, always ask your healthcare professional. [address] any warranty or liability for your use of this information.

## 2021-03-25 NOTE — PROGRESS NOTES
Subjective:    Terry Díaz is here for a re-check of her blood pressure. She appears well controlled on Losartan and Hygroton. She takes a K supplement. 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 Objective:    /66   Pulse 70   Temp 97.9 °F (36.6 °C) (Temporal)   Ht 4' 10\" (1.473 m)   Wt 140 lb 6.4 oz (63.7 kg)   LMP  (LMP Unknown)   BMI 29.34 kg/m²     Exam is as noted by resident with the following changes, additions or corrections:      Assessment/Plan:        Terry Díaz was seen today for hypertension. Diagnoses and all orders for this visit:    Essential hypertension    Trigger finger of left thumb           Attending Physician Statement    I have reviewed the chart, including any radiology or labs. I have discussed the case, including pertinent history and exam findings with the resident. I agree with the assessment, plan and orders as documented by the resident. Please refer to the resident note for additional information.       Electronically signed by Radha Nam DO on 3/25/2021 at 6:35 PM

## 2021-03-25 NOTE — PROGRESS NOTES
tablet 1    chlorthalidone (HYGROTON) 25 MG tablet Take 1 tablet by mouth daily 90 tablet 1    losartan (COZAAR) 25 MG tablet Take 1 tablet by mouth daily 90 tablet 1    potassium chloride (KLOR-CON M) 20 MEQ extended release tablet Take one tablet by mouth daily at the same time as your chlorthalidone (or Hygroton) pill. 90 tablet 1    NONFORMULARY 1 tablet 2 times daily Glucosamine      cetirizine (ZYRTEC) 10 MG tablet Take 10 mg by mouth daily      ibuprofen (ADVIL MIGRAINE) 200 MG CAPS Take 1 capsule by mouth as needed for Pain      Multiple Vitamins-Minerals (EYE VITAMINS) CAPS Take by mouth 2 times daily       No current facility-administered medications for this visit. Review of Systems:   Review of Systems   Constitutional: Negative for chills and fever. HENT: Negative for congestion and rhinorrhea. Eyes: Negative for pain and visual disturbance. Respiratory: Negative for shortness of breath and wheezing. Cardiovascular: Negative for chest pain and palpitations. Gastrointestinal: Negative for abdominal pain and nausea. Genitourinary: Negative for dysuria and hematuria. Musculoskeletal: Positive for arthralgias (thumb joints). Negative for myalgias. Skin: Negative for color change and rash. Neurological: Negative for dizziness, syncope, light-headedness and headaches. Physical Exam   Vitals: /66   Pulse 70   Temp 97.9 °F (36.6 °C) (Temporal)   Ht 4' 10\" (1.473 m)   Wt 140 lb 6.4 oz (63.7 kg)   LMP  (LMP Unknown)   BMI 29.34 kg/m²   Physical Exam  Vitals signs reviewed. Constitutional:       General: She is not in acute distress. Appearance: Normal appearance. HENT:      Head: Normocephalic and atraumatic. Right Ear: External ear normal.      Left Ear: External ear normal.      Nose: No rhinorrhea. Eyes:      General:         Right eye: No discharge. Left eye: No discharge. Neck:      Musculoskeletal: Normal range of motion.  No neck rigidity. Cardiovascular:      Rate and Rhythm: Normal rate and regular rhythm. Heart sounds: Normal heart sounds. No murmur. Pulmonary:      Effort: Pulmonary effort is normal.      Breath sounds: Normal breath sounds. No wheezing. Musculoskeletal: Normal range of motion. General: Tenderness (left thumb base) present. Right lower leg: No edema. Left lower leg: No edema. Comments: Catching and popping of thumb in flexion to extension noted, no nodule palpable   Skin:     General: Skin is warm and dry. Findings: No rash. Neurological:      Mental Status: She is alert and oriented to person, place, and time. Mental status is at baseline. Assessment and Plan     1. Essential hypertension  Continue with current regimen  Losartan 25mg qd  Chlorthalidone 25mg qd  Discussed signs of high and low BP  Follow up in 3 months, will also do a lipid panel at that time    2. Trigger finger of left thumb  Patient does not want to pursue surgical correction or injections at this time  She is satisfied to know what is happening right now  Discussed rest, anti-inflamatory medications for pain  Follow up PRN    Counseled regarding above diagnosis, including possible risks and complications, especially if left uncontrolled. Counseled regarding the possible side effects, risks, benefits and alternatives to treatment; patient and/or guardian verbalizes understanding, agrees, feels comfortable with, and wishes to proceed with above treatment plan. Call or go to ED immediately if symptoms worsen or persist. Advised patient to call with any new medication issues and, as applicable, read all Rx info from pharmacy to assure aware of all possible risks and side effects of medication before taking. Patient and/or guardian given opportunity to ask questions/raise concerns. The patient verbalized comfort and understanding ofinstructions.     I encourage further reading and education about your health conditions. Information on many health conditions is provided by [facility] [employer]: [email or web address]  Please bring any questions to me at your next visit. Return to Office: Return in about 3 months (around 6/25/2021) for FU HTN.     Willian Jacobs, DO

## 2021-04-16 ENCOUNTER — HOSPITAL ENCOUNTER (OUTPATIENT)
Dept: INFUSION THERAPY | Age: 76
Discharge: HOME | End: 2021-04-16
Payer: MEDICARE

## 2021-04-16 ENCOUNTER — OFFICE VISIT (OUTPATIENT)
Dept: ONCOLOGY | Age: 76
End: 2021-04-16
Payer: MEDICARE

## 2021-04-16 VITALS
WEIGHT: 139 LBS | HEIGHT: 58 IN | DIASTOLIC BLOOD PRESSURE: 70 MMHG | HEART RATE: 103 BPM | SYSTOLIC BLOOD PRESSURE: 150 MMHG | TEMPERATURE: 97.8 F | OXYGEN SATURATION: 99 % | BODY MASS INDEX: 29.18 KG/M2

## 2021-04-16 DIAGNOSIS — D05.12 DUCTAL CARCINOMA IN SITU (DCIS) OF LEFT BREAST: ICD-10-CM

## 2021-04-16 DIAGNOSIS — D05.12 DUCTAL CARCINOMA IN SITU (DCIS) OF LEFT BREAST: Primary | ICD-10-CM

## 2021-04-16 LAB
ALBUMIN SERPL-MCNC: 4.3 G/DL (ref 3.5–5.2)
ALP BLD-CCNC: 95 U/L (ref 35–104)
ALT SERPL-CCNC: 13 U/L (ref 0–32)
ANION GAP SERPL CALCULATED.3IONS-SCNC: 13 MMOL/L (ref 7–16)
AST SERPL-CCNC: 22 U/L (ref 0–31)
BASOPHILS ABSOLUTE: 0.08 E9/L (ref 0–0.2)
BASOPHILS RELATIVE PERCENT: 1.4 % (ref 0–2)
BILIRUB SERPL-MCNC: 0.5 MG/DL (ref 0–1.2)
BUN BLDV-MCNC: 16 MG/DL (ref 8–23)
CALCIUM SERPL-MCNC: 10 MG/DL (ref 8.6–10.2)
CHLORIDE BLD-SCNC: 98 MMOL/L (ref 98–107)
CO2: 25 MMOL/L (ref 22–29)
CREAT SERPL-MCNC: 0.8 MG/DL (ref 0.5–1)
EOSINOPHILS ABSOLUTE: 0.09 E9/L (ref 0.05–0.5)
EOSINOPHILS RELATIVE PERCENT: 1.5 % (ref 0–6)
GFR AFRICAN AMERICAN: >60
GFR NON-AFRICAN AMERICAN: >60 ML/MIN/1.73
GLUCOSE BLD-MCNC: 120 MG/DL (ref 74–99)
HCT VFR BLD CALC: 37.5 % (ref 34–48)
HEMOGLOBIN: 12.1 G/DL (ref 11.5–15.5)
IMMATURE GRANULOCYTES #: 0.02 E9/L
IMMATURE GRANULOCYTES %: 0.3 % (ref 0–5)
LYMPHOCYTES ABSOLUTE: 1.39 E9/L (ref 1.5–4)
LYMPHOCYTES RELATIVE PERCENT: 23.9 % (ref 20–42)
MCH RBC QN AUTO: 27.6 PG (ref 26–35)
MCHC RBC AUTO-ENTMCNC: 32.3 % (ref 32–34.5)
MCV RBC AUTO: 85.4 FL (ref 80–99.9)
MONOCYTES ABSOLUTE: 0.55 E9/L (ref 0.1–0.95)
MONOCYTES RELATIVE PERCENT: 9.5 % (ref 2–12)
NEUTROPHILS ABSOLUTE: 3.69 E9/L (ref 1.8–7.3)
NEUTROPHILS RELATIVE PERCENT: 63.4 % (ref 43–80)
PDW BLD-RTO: 14.1 FL (ref 11.5–15)
PLATELET # BLD: 209 E9/L (ref 130–450)
PMV BLD AUTO: 12 FL (ref 7–12)
POTASSIUM SERPL-SCNC: 3.7 MMOL/L (ref 3.5–5)
RBC # BLD: 4.39 E12/L (ref 3.5–5.5)
SODIUM BLD-SCNC: 136 MMOL/L (ref 132–146)
TOTAL PROTEIN: 7.9 G/DL (ref 6.4–8.3)
WBC # BLD: 5.8 E9/L (ref 4.5–11.5)

## 2021-04-16 PROCEDURE — 36415 COLL VENOUS BLD VENIPUNCTURE: CPT

## 2021-04-16 PROCEDURE — 85025 COMPLETE CBC W/AUTO DIFF WBC: CPT

## 2021-04-16 PROCEDURE — 99214 OFFICE O/P EST MOD 30 MIN: CPT | Performed by: INTERNAL MEDICINE

## 2021-04-16 PROCEDURE — 99212 OFFICE O/P EST SF 10 MIN: CPT

## 2021-04-16 PROCEDURE — 80053 COMPREHEN METABOLIC PANEL: CPT

## 2021-04-16 NOTE — PROGRESS NOTES
[facility] [facility]  [address] [place] [id number]  Dept: [phone]  Attending Progress note      Reason for Visit:   Left Breast DCIS. Referring Physician:  Marco Antonio Donahue MD.    PCP:  Ata Serrano DO    History of Present Illness: The patient is a pleasant 68 y.o. lady, with a past medical history significant for hyperlipidemia, and hypertension, who had presented with an abnormal screening Mammogram:  MAMMOGRAM (12/11/2018): INDICATION:   Screening.       HISTORY:   The patient has no personal history of cancer. The patient has the following family history of breast cancer:  maternal aunt.       MAMMOGRAM VIEWS:   The following mammographic views where obtained: bilateral craniocaudal; bilateral craniocaudal with tomosynthesis; bilateral mediolateral oblique; and bilateral mediolateral oblique with tomosynthesis       TOMOSYNTHESIS:   Tomosynthesis (3 Dimensional Breast Imaging) was used on this examination to aid in evaluation.       COMPARISON:   No prior imaging studies are available for comparison.       CAD:   This exam was reviewed using the Pixia Computer Aided Detection (CAD)       TISSUE DENSITY:   The breasts are heterogeneously dense (Type 3 density).       MAMMOGRAM FINDINGS:   In the right breast, no suspicious masses, areas of suspicious architectural distortion, suspicious calcifications, or additional suspicious findings are identified.           Finding 1:   There are coarse heterogeneous and fine pleomorphic calcifications measuring 17 mm seen in the upper outer quadrant of the left breast.       IMPRESSION:   Calcifications in the left breast require additional evaluation.    Spot magnification views with consideration for a stereotactic biopsy if needed.       =======================================   BI-RADS Category 0:  Incomplete: Need Additional Imaging Evaluation   =======================================       RISK ASSESSMENT:   During this patient's visit, information obtained was used to generate a lifetime risk assessment using the Tyrer-Cuzick model (also called the BRENNON [International Breast Cancer Intervention Study] Breast Cancer Risk Evaluation Tool).        LIFETIME RISK    Patient has a Tyrer Cuzick score of 7.9%    BREAST TISSUE DENSITY    Heterogeneously dense       -AVERAGE RISK (<15% )   Yearly screening mammograms starting at age 36 and older. Regardless of breast density, tomosynthesis is suggested. Monthly self-breast exams are recommended for women age 27 and older.       NOTE:   Mammography is not 100% accurate in the detection of breast cancer.  Accuracy decreases as mammographic density of the breast increases.  A negative mammogram should not deter further evaluation (including biopsy) of suspicious physical findings.     Recommendations are based on NCCN ([address]) and ACR [employer] of Radiology) guidelines.       Thank you for sending your patient to this ACR and FDA approved facility. If there are any physician concerns regarding this report, a Radiologist can be reached by calling the following number [phone].       Follow up Protocol for the [facility]:   BI-RADS 1 or 2: Bisi Curiel will send a reminder when next mammogram is due. BI-RADS 3: Bisi Curiel will send a reminder for recommended short term follow up.    BI-RADS 0, 4 or 5: Bisi Curiel will contact patient to schedule all additional views and procedures.      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  Detached fragments of carcinoma  without associated stroma are also present.  Microcalcifications are  noted.  Intradepartmental consultation is obtained. Breast Cancer Marker Studies:  Estrogen Receptors (ER):  Positive:         Percentage of cells positive:  >90%         Intensity:  Strong    Progesterone Receptors ([place]):  Positive:         Percentage of cells positive:  90%         Intensity:  Moderate to strong     The patient underwent on 1/18/2019 a Left breast needle localized lumpectomy, pathology:  CANCER CASE SUMMARY  Procedure-excision  Specimen laterality-left  Size (extent) of DCIS: 1.3 x 1.2 x 1.2 cm  Histologic type-ductal carcinoma in situ  Nuclear grade-grade 3 (high)  Necrosis-present, central  Margins (part A)-anterior and lateral margins involved by DCIS; DCIS is 1  mm from posterior margin  Margins (part B)- uninvolved by DCIS   Distance from closest margin: 1 mm from superior margin  Regional lymph nodes-no lymph nodes submitted or found  TNM classification (AJCC eighth edition)-pTis  NX MX  Ancillary studies-see prior report [facility]  for ER/ [place] results    She underwent on 3/26/2019 a left lumpectomy specimen, new posterior margin, additional superior margin, path was neg for residual DCIS. The patient completed adjuvant radiation therapy on 6/6/2019. She was started on Arimidex on 6/18/2019. The patient denies any side effects from the Arimidex, she is feeling well in general, she has chronic back pain. No new bony pain. No new breast changes. She denies any bleeding, no melena or hematochezia. Review of Systems;  CONSTITUTIONAL: No fever, chills. Good energy level. Weight is stable since last visit. ENMT: Eyes: No diplopia; Nose: No epistaxis. Mouth: No sore throat. RESPIRATORY: No hemoptysis, shortness of breath, cough. CARDIOVASCULAR: No chest pain, palpitations. GASTROINTESTINAL: No nausea/vomiting, abdominal pain, diarrhea/constipation. GENITOURINARY: No dysuria, urinary frequency, hematuria. NEURO: No syncope, presyncope, headache. MSK: pos for chronic joints pain.   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 LUMPECTOMY Left 3/26/2019    LEFT BREAST LUMPECTOMY RE-EXCISION ANTERIOR LATERAL SUPERIOR & POSTERIOR MARGINS , REPLACEMENT OF BIOZORB performed by Zac Jansen MD at [place] [id number]       Family History:  Family History   Problem Relation Age of Onset   [address] Parkinsonism Mother     Heart Attack Father     Cancer Father 80        leukemia    Cancer Maternal Aunt         Breast--60 or 76s    Cancer Maternal Grandfather         unknown    Cancer Paternal Cousin 16        leukemia       Medications:  Reviewed and reconciled. Social History:  Social History     Socioeconomic History    Marital status: Single     Spouse name: Not on file    Number of children: Not on file    Years of education: Not on file    Highest education level: Not on file   Occupational History    Not on file   Social Needs    Financial resource strain: Not on file    Food insecurity     Worry: Not on file     Inability: Not on file    Transportation needs     Medical: Not on file     Non-medical: Not on file   Tobacco Use    Smoking status: Never Smoker    Smokeless tobacco: Never Used   Substance and Sexual Activity    Alcohol use:  Yes     Alcohol/week: 1.0 standard drinks     Types: 1 Glasses of wine per week     Frequency: Monthly or less     Drinks per session: 1 or 2     Binge frequency: Never     Comment: occasionally    Drug use: No    Sexual activity: Not Currently   Lifestyle    Physical activity     Days per week: Not on file     Minutes per session: Not on file    Stress: Not on file   Relationships    Social connections     Talks on phone: Not on file     Gets together: Not on file     Attends Quaker service: Not on file     Active member of club or organization: Not on file     Attends meetings of clubs or organizations: Not on file     Relationship status: Not on file    Intimate partner violence     Fear of current or ex partner: Not on file     Emotionally abused: Not on file     Physically abused: Not on file     Forced sexual activity: Not on file   Other Topics Concern    Not on file   Social History Narrative    Essie Cooper lives in [place] alone - retired from [employer]. Allergies: Allergies   Allergen Reactions    Poultry Meal Hives    Hydrocodone-Acetaminophen Rash    Molds & Smuts Other (See Comments)    Pcn [Penicillins] Rash     OB/GYN:  Age of menarche was 8. Age of menopause was 61. Last menstrual period was age 61. Patient denies hormonal therapy. Patient is     Physical Exam:  BP (!) 150/70 (Site: Right Upper Arm, Position: Sitting, Cuff Size: Medium Adult)   Pulse 103   Temp 97.8 °F (36.6 °C) (Temporal)   Ht 4' 10\" (1.473 m)   Wt 139 lb (63 kg)   LMP  (LMP Unknown)   SpO2 99%   BMI 29.05 kg/m²     GENERAL: Alert, oriented x 3, not in acute distress. HEENT: PERRLA; EOMI. Oropharynx clear. NECK: Supple. No palpable cervical or supraclavicular lymphadenopathy. LUNGS: Good air entry bilaterally. No wheezing, crackles or rhonchi. BREASTS: Right breast exam is negative for any skin changes, no nipple discharge, no palpable masses, no palpable axillary lymphadenopathy.  The left breast exam is negative for any nipple discharge, she has a scar from her lumpectomy, no palpable masses, no palpable left axillary lymphadenopathy. CARDIOVASCULAR: Regular rate. No murmurs, rubs or gallops. ABDOMEN: Soft. Non-tender, non-distended. Positive bowel sounds. EXTREMITIES: Without clubbing, cyanosis, or edema. NEUROLOGIC: No focal deficits. ECOG PS 1    Impression/Plan:      The patient is a pleasant 68 y.o. lady, with a past medical history significant for hyperlipidemia, and hypertension, who had presented with an abnormal screening Mammogram, she was diagnosed with a left breast DCIS, she is s/p Left breast needle localized lumpectomy, the DCIS is high grade with central necrosis, final margins negative, final pathologic stage is pTis NX MX, satge 0 disease, ER positive > 90% AL positive, 90%. I discussed with the patient her diagnosis, characteristics of her DCIS, risk of disease recurrence, the increased risk of having an invasive breast cancer in the ipsilateral and contralateral breast. The patient has an ER positive disease I recommended endocrine therapy with an aromatase inhibitor, we discussed the risks and benefits of Arimidex.  I had discussed with the patient the side effects of Arimidex, including but not limited to mood changes, hot flashes, joints pain, osteoporosis. She completed adjuvant radiation therapy on 6/6/2019, . She was started on Arimidex on 6/18/2019, she is tolerating it well overall, continue Arimidex. The patient had a DEXA scan done on 6/9/2020, revealing a normal bone density, will be due for repeat DEXA scan in June 2021. Continue calcium and vitamin D.        I discussed with her the importance of healthy lifestyle, monthly breast self-examination, as well as routine screening mammograms, she had bilateral screening mammogram done on 12/14/2020, there was no mammographic evidence of malignancy, she is scheduled for a repeat screening mammogram on 12/15/2021. I reviewed with the patient surveillance guidelines. Continue with surveillance.     Mild anemia, normocytic,  fit test was done recently, and was negative, a work-up was ordered, she does not have iron vitamin B12, folate deficiency. Radha test is negative, sh is normal, reticulocytes 1.2%, the results were reviewed with the patient, repeat CBCD today was remarkable for improvement of her hemoglobin to 12.1, hematocrit 37.5, they are within normal range. We will continue to monitor her CBCD. RTC in 3 months. Thank you for allowing us to participate in the care of Mrs. Staley.     Juan Lyles MD   HEMATOLOGY/MEDICAL ONCOLOGY  [address] [place] [address] MED ONCOLOGY  [facility] [facility] [address] [facility] [id number]  Dept: [phone]

## 2021-06-03 ASSESSMENT — ENCOUNTER SYMPTOMS
VOICE CHANGE: 0
EYE ITCHING: 0
CHEST TIGHTNESS: 0
TROUBLE SWALLOWING: 0
NAUSEA: 0
SORE THROAT: 0
BLOOD IN STOOL: 0
EYE DISCHARGE: 0
ABDOMINAL DISTENTION: 0
CHOKING: 0
BACK PAIN: 0
COUGH: 0
DIARRHEA: 0
SINUS PRESSURE: 0
ABDOMINAL PAIN: 0
WHEEZING: 0
VOMITING: 0
CONSTIPATION: 0
SHORTNESS OF BREATH: 0
SINUS PAIN: 0
RHINORRHEA: 0

## 2021-06-03 NOTE — PROGRESS NOTES
Subjective: This note was copied forward from the last encounter. Essential components for this patient record were reviewed and verified on this visit including:  recent hospitalizations, recent imaging, PMH, PSH, FH, SOC HX, Allergies, and Medications were reviewed and updated as appropriate. In addition, the assessment and plan were copied from prior office note and updated accordingly. Patient ID: Sabina Cox is a 68 y.o. female. HPI   The patient is a pleasant 68 y.o. lady, with a past medical history significant for hyperlipidemia, and hypertension, who had presented with an abnormal screening Mammogram:  MAMMOGRAM (12/11/2018): INDICATION:   Screening.       HISTORY:   The patient has no personal history of cancer. The patient has the following family history of breast cancer:  maternal aunt.       MAMMOGRAM VIEWS:   The following mammographic views where obtained: bilateral craniocaudal; bilateral craniocaudal with tomosynthesis; bilateral mediolateral oblique; and bilateral mediolateral oblique with tomosynthesis       TOMOSYNTHESIS:   Tomosynthesis (3 Dimensional Breast Imaging) was used on this examination to aid in evaluation.       COMPARISON:   No prior imaging studies are available for comparison.       CAD:   This exam was reviewed using the [employer] Computer Aided Detection (CAD)       TISSUE DENSITY:   The breasts are heterogeneously dense (Type 3 density).       MAMMOGRAM FINDINGS:   In the right breast, no suspicious masses, areas of suspicious architectural distortion, suspicious calcifications, or additional suspicious findings are identified.           Finding 1:   There are coarse heterogeneous and fine pleomorphic calcifications measuring 17 mm seen in the upper outer quadrant of the left breast.       IMPRESSION:   Calcifications in the left breast require additional evaluation.    Spot magnification views with consideration for a stereotactic biopsy if needed.     =======================================   BI-RADS Category 0:  Incomplete: Need Additional Imaging Evaluation   =======================================       RISK ASSESSMENT:   During this patient's visit, information obtained was used to generate a lifetime risk assessment using the Tyrer-Cuzick model (also called the BRENNON [International Breast Cancer Intervention Study] Breast Cancer Risk Evaluation Tool).      LIFETIME RISK    Patient has a Tyrer Cuzick score of 7.9%    BREAST TISSUE DENSITY    Heterogeneously dense       -AVERAGE RISK (<15% )   Yearly screening mammograms starting at age 36 and older. Regardless of breast density, tomosynthesis is suggested. Monthly self-breast exams are recommended for women age 27 and older.       NOTE:   Mammography is not 100% accurate in the detection of breast cancer.  Accuracy decreases as mammographic density of the breast increases.  A negative mammogram should not deter further evaluation (including biopsy) of suspicious physical findings.     Recommendations are based on NCCN ([address]) and ACR [employer] of Radiology) guidelines.       Thank you for sending your patient to this ACR and FDA approved facility. If there are any physician concerns regarding this report, a Radiologist can be reached by calling the following number [phone].       Follow up Protocol for the [facility]:   BI-RADS 1 or 2: Bertha Castillo will send a reminder when next mammogram is due. BI-RADS 3: Bertha Castillo will send a reminder for recommended short term follow up.    BI-RADS 0, 4 or 5: Bertha Castillo will contact patient to schedule all additional views and procedures.      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  Detached fragments of carcinoma  without associated stroma are also present.  Microcalcifications are  noted.  Intradepartmental consultation is obtained.     Breast Cancer Marker Studies:  Estrogen Receptors (ER):  Positive:         Percentage of cells positive:  >90%         Intensity:  Strong    Progesterone Receptors (AK):  Positive:         Percentage of cells positive:  90%         Intensity:  Moderate to strong     The patient underwent on 1/18/2019 a Left breast needle localized lumpectomy, pathology:  CANCER CASE SUMMARY  Procedure-excision  Specimen laterality-left  Size (extent) of DCIS: 1.3 x 1.2 x 1.2 cm  Histologic type-ductal carcinoma in situ  Nuclear grade-grade 3 (high)  Necrosis-present, central  Margins (part A)-anterior and lateral margins involved by DCIS; DCIS is 1  mm from posterior margin  Margins (part B)- uninvolved by DCIS   Distance from closest margin: 1 mm from superior margin  Regional lymph nodes-no lymph nodes submitted or found  TNM classification (AJCC eighth edition)-pTis  NX MX  Ancillary studies-see prior report [facility]  for ER/ AK results     - 3/26/2019 a left lumpectomy specimen, new posterior margin, additional superior margin, path was neg for residual DCIS.    -RT:  Completed 06/06/2019.  -ET:  Arimidex started 06/18/2019      Past Medical History:   Diagnosis Date    Cancer [facility] [address] 2019    breast    Hyperlipidemia     Hypertension     Pneumonia 1953    Shingles      Past Surgical History:   Procedure Laterality Date    BREAST BIOPSY Left 2/27/2019    LEFT BREAST NEEDLE LOCALIZED  LUMPECTOMY  WITH BIOZORB  PLACEMENT --TRIDENT performed by Yo Laura MD at . [address] LUMPECTOMY Left 3/26/2019    LEFT BREAST LUMPECTOMY RE-EXCISION ANTERIOR LATERAL SUPERIOR & POSTERIOR MARGINS , REPLACEMENT OF BIOZORB performed by Yo Laura MD at [place] [address] Social History     Tobacco Use    Smoking status: Never Smoker    Smokeless tobacco: Never Used   Vaping Use    Vaping Use: Never used   Substance Use Topics    Alcohol use: Yes     Alcohol/week: 1.0 standard drinks     Types: 1 Glasses of wine per week     Comment: occasionally    Drug use: No     Allergies   Allergen Reactions    Poultry Meal Hives    Hydrocodone-Acetaminophen Rash    Molds & Smuts Other (See Comments)    Pcn [Penicillins] Rash     Current Outpatient Medications on File Prior to Visit   Medication Sig Dispense Refill    anastrozole (ARIMIDEX) 1 MG tablet Take 1 tablet by mouth daily 90 tablet 2    calcium carbonate-vitamin D (CALCIUM 600 + D) 600-400 MG-UNIT TABS per tab Take 1 tablet by mouth 2 times daily 180 tablet 2    atorvastatin (LIPITOR) 40 MG tablet Take 1 tablet by mouth daily 90 tablet 1    chlorthalidone (HYGROTON) 25 MG tablet Take 1 tablet by mouth daily 90 tablet 1    losartan (COZAAR) 25 MG tablet Take 1 tablet by mouth daily 90 tablet 1    potassium chloride (KLOR-CON M) 20 MEQ extended release tablet Take one tablet by mouth daily at the same time as your chlorthalidone (or [place]gro[place]) pill. 90 tablet 1    NONFORMULARY 1 tablet 2 times daily Glucosamine      cetirizine (ZYRTEC) 10 MG tablet Take 10 mg by mouth daily      ibuprofen (ADVIL MIGRAINE) 200 MG CAPS Take 1 capsule by mouth as needed for Pain      Multiple Vitamins-Minerals (EYE VITAMINS) CAPS Take by mouth 2 times daily       No current facility-administered medications on file prior to visit. Review of Systems   Constitutional: Negative for activity change, appetite change, chills, fatigue, fever and unexpected weight change. She continues to do well. Doing well. Scattered arthralgias,most notable of the B/L hips; not interfering with her quality of life.    HENT: Negative for congestion, postnasal drip, rhinorrhea, sinus pressure, sinus pain, sore throat, trouble swallowing and voice change. Eyes: Negative for discharge, itching and visual disturbance. Respiratory: Negative for cough, choking, chest tightness, shortness of breath and wheezing. Cardiovascular: Negative for chest pain, palpitations and leg swelling. Gastrointestinal: Negative for abdominal distention, abdominal pain, blood in stool, constipation, diarrhea, nausea and vomiting. Endocrine: Negative for cold intolerance and heat intolerance. Genitourinary: Negative for difficulty urinating, dysuria, frequency and hematuria. Musculoskeletal: Negative for arthralgias, back pain, gait problem, joint swelling, myalgias, neck pain and neck stiffness. Allergic/Immunologic: Negative for environmental allergies and food allergies. Neurological: Negative for dizziness, seizures, syncope, speech difficulty, weakness, light-headedness and headaches. Hematological: Negative for adenopathy. Does not bruise/bleed easily. Psychiatric/Behavioral: Negative for agitation, confusion and decreased concentration. The patient is not nervous/anxious. Objective:   Physical Exam  Vitals and nursing note reviewed. Constitutional:       General: She is not in acute distress. Appearance: Normal appearance. She is well-developed. She is not diaphoretic. Comments: ECOG is stable at 0; pleasant and cooperative. HENT:      Head: Normocephalic and atraumatic. Mouth/Throat:      Pharynx: No oropharyngeal exudate. Eyes:      General: No scleral icterus. Right eye: No discharge. Left eye: No discharge. Conjunctiva/sclera: Conjunctivae normal.   Neck:      Thyroid: No thyromegaly. Vascular: No JVD. Trachea: No tracheal deviation. Cardiovascular:      Rate and Rhythm: Normal rate and regular rhythm. Heart sounds: No murmur heard. No friction rub. No gallop. Pulmonary:      Effort: Pulmonary effort is normal. No respiratory distress or retractions.       Breath sounds: Normal breath sounds. No stridor. No wheezing or rales. Chest:      Chest wall: No mass, lacerations, deformity, swelling, tenderness or edema. Breasts: Breasts are asymmetrical (Left breast smaller than right). Right: No inverted nipple, mass, nipple discharge, skin change or tenderness. Left: No inverted nipple, mass, nipple discharge, skin change or tenderness. Comments: Right breast exam remains stable with no skin changes,  No nipple discharge. No clinically suspicious lumps nodules or masses appreciated. No axillary lymphadenopathy. Abdominal:      General: There is no distension. Palpations: Abdomen is soft. Tenderness: There is no abdominal tenderness. There is no guarding or rebound. Musculoskeletal:         General: No tenderness or deformity. Normal range of motion. Right shoulder: Normal.      Left shoulder: Normal.      Cervical back: Normal range of motion and neck supple. Lymphadenopathy:      Cervical: No cervical adenopathy. Right cervical: No superficial, deep or posterior cervical adenopathy. Left cervical: No superficial, deep or posterior cervical adenopathy. Upper Body:      Right upper body: No pectoral adenopathy. Left upper body: No pectoral adenopathy. Skin:     General: Skin is warm and dry. Coloration: Skin is not pale. Findings: No erythema or rash. Comments: Bilateral skin boils related to bug bites. There is local erythema; no edema, and no tenderness or signs of phlebitis. Neurological:      Mental Status: She is alert and oriented to person, place, and time. Coordination: Coordination normal.   Psychiatric:         Behavior: Behavior normal.         Thought Content: Thought content normal.         Judgment: Judgment normal.       Assessment:     68 y.o. Extremely pleasant female with DCIS left breast s/p lumpectomy. ER/PA positive disease.     PATHOLOGY:  Diagnosis:  Left breast, core Saturday; she has a boil on her lower extremities bilaterally, anterior shins. Local erythema, no edema, no evidence of phlebitis. We called her primary care physician who is unable to see her today. She will be given a prescription for antibiotic and advised to follow-up with her primary care physician as scheduled on Tuesday, 6/22/2021. In addition, she was advised that should she develop new or worsening symptoms she should go to the local urgent care or emergency room for further evaluation and management. Plan:   1. Continue monthly breast/chest wall self examination; detailed instructions reviewed today. Bring any changes to your physician's attention. 2. Continue healthy diet and exercise routinely as tolerated. 3. Maintaining ideal body weight (20-25 BMI) may lead to optimal breast cancer outcomes. 4. Avoid alcohol. 5. Repeat mammogram December 2021 (not ordered). 6. Continue rimidex 1 mg daily at the discretion of medical oncology team.  7. Ca+/VitD while on Arimidex. 8. Continue follow up with Medical Oncology and Primary Care. 9. RTC 6 months with mammogram same day. During today's visit, face-to-face time 25 minutes, greater than 50% in counseling education and coordination of care. All questions were answered to her apparent satisfaction, and she is agreeable to the plan as outlined above. Maury Toribio RN, MSN, APRN-CNP, [address]  Advanced Oncology Certified Nurse Practitioner  Department of Breast Surgery  [facility] [place]/  [facility] in collaboration with Dr. Sandhya Ramirez/Stacy Dietrich

## 2021-06-15 ENCOUNTER — OFFICE VISIT (OUTPATIENT)
Dept: BREAST CENTER | Age: 76
End: 2021-06-15
Payer: MEDICARE

## 2021-06-15 ENCOUNTER — HOSPITAL ENCOUNTER (OUTPATIENT)
Dept: GENERAL RADIOLOGY | Age: 76
Discharge: HOME | End: 2021-06-17
Payer: MEDICARE

## 2021-06-15 VITALS
DIASTOLIC BLOOD PRESSURE: 64 MMHG | OXYGEN SATURATION: 97 % | TEMPERATURE: 97.7 F | SYSTOLIC BLOOD PRESSURE: 138 MMHG | WEIGHT: 138 LBS | HEART RATE: 76 BPM | RESPIRATION RATE: 18 BRPM | BODY MASS INDEX: 28.97 KG/M2 | HEIGHT: 58 IN

## 2021-06-15 DIAGNOSIS — Z79.811 AROMATASE INHIBITOR USE: ICD-10-CM

## 2021-06-15 DIAGNOSIS — Z78.0 POSTMENOPAUSAL: ICD-10-CM

## 2021-06-15 DIAGNOSIS — Z85.3 PERSONAL HISTORY OF ADENOCARCINOMA OF BREAST: ICD-10-CM

## 2021-06-15 DIAGNOSIS — E78.2 MIXED HYPERLIPIDEMIA: ICD-10-CM

## 2021-06-15 DIAGNOSIS — Z85.3 PERSONAL HISTORY OF BREAST CANCER: ICD-10-CM

## 2021-06-15 PROCEDURE — 77080 DXA BONE DENSITY AXIAL: CPT

## 2021-06-15 PROCEDURE — 99213 OFFICE O/P EST LOW 20 MIN: CPT | Performed by: NURSE PRACTITIONER

## 2021-06-15 RX ORDER — DOXYCYCLINE HYCLATE 100 MG
100 TABLET ORAL 2 TIMES DAILY
Qty: 20 TABLET | Refills: 0 | Status: SHIPPED | OUTPATIENT
Start: 2021-06-15 | End: 2021-06-25

## 2021-06-15 NOTE — PATIENT INSTRUCTIONS

## 2021-06-16 RX ORDER — ATORVASTATIN CALCIUM 40 MG/1
40 TABLET, FILM COATED ORAL DAILY
Qty: 90 TABLET | Refills: 1 | Status: SHIPPED
Start: 2021-06-16 | End: 2021-12-10

## 2021-06-20 ENCOUNTER — HOSPITAL ENCOUNTER (EMERGENCY)
Age: 76
Discharge: HOME | End: 2021-06-20
Payer: MEDICARE

## 2021-06-20 VITALS
HEART RATE: 91 BPM | OXYGEN SATURATION: 100 % | HEIGHT: 58 IN | BODY MASS INDEX: 29.18 KG/M2 | SYSTOLIC BLOOD PRESSURE: 176 MMHG | DIASTOLIC BLOOD PRESSURE: 71 MMHG | RESPIRATION RATE: 14 BRPM | WEIGHT: 139 LBS | TEMPERATURE: 97 F

## 2021-06-20 DIAGNOSIS — R21 RASH AND OTHER NONSPECIFIC SKIN ERUPTION: Primary | ICD-10-CM

## 2021-06-20 PROCEDURE — 99283 EMERGENCY DEPT VISIT LOW MDM: CPT

## 2021-06-20 RX ORDER — CETIRIZINE HYDROCHLORIDE 10 MG/1
10 TABLET ORAL DAILY
Qty: 10 TABLET | Refills: 0 | Status: SHIPPED | OUTPATIENT
Start: 2021-06-20 | End: 2021-06-30

## 2021-06-20 RX ORDER — TRIAMCINOLONE ACETONIDE 5 MG/G
CREAM TOPICAL
Qty: 15 G | Refills: 0 | Status: SHIPPED | OUTPATIENT
Start: 2021-06-20 | End: 2021-06-27

## 2021-06-20 NOTE — ED PROVIDER NOTES
(Left, 3/26/2019). Social History:  reports that she has never smoked. She has never used smokeless tobacco. She reports current alcohol use of about 1.0 standard drinks of alcohol per week. She reports that she does not use drugs. Family History: family history includes Cancer in her maternal aunt and maternal grandfather; Cancer (age of onset: 12) in her paternal cousin; Cancer (age of onset: 80) in her father; Heart Attack in her father; Parkinsonism in her mother. Allergies: Poultry meal, Hydrocodone-acetaminophen, Molds & smuts, and Pcn [penicillins]    Physical Exam   Oxygen Saturation Interpretation: Normal.        ED Triage Vitals   BP Temp Temp Source Pulse Resp SpO2 Height Weight   06/20/21 1317 06/20/21 1316 06/20/21 1316 06/20/21 1316 06/20/21 1316 06/20/21 1316 06/20/21 1316 06/20/21 1316   (!) 176/71 97 °F (36.1 °C) Oral 91 14 100 % 4' 10\" (1.473 m) 139 lb (63 kg)         Constitutional:  Alert, development consistent with age. HEENT:  NC/NT. Airway patent. Eyes:  PERRL, EOMI, no discharge. Ears:  TMs without perforation, injection, or bulging. External canals clear without exudate. Mouth:  Mucous membranes moist without lesions, tongue and gums normal.  Throat:  Pharynx without injection, exudate, or tonsillar hypertrophy. Airway patient. Neck:  Supple. No lymphadenopathy. Respiratory:  Clear to auscultation and breath sounds equal.  CV:  Regular rate and rhythm. GI:  Abdomen Soft, nontender, +BS. Integument:  Skin turgor: Normal.  See photo below nonpainful dry lesions to right cheek. Neurological:  Orientation age-appropriate unless noted elseware. Motor functions intact. **Informed Consent**    The patient has given verbal consent to have photos taken of right face and inserted into their ED Provider Note as part of their permanent medical record for purposes of documentation, treatment management and/or medical review.    All Images taken on 6/20/21 of patient name: Hailey Garay were transmitted and stored on secured [employer] located within [facility] by a registered [employer] Mobile Application Device. non painful . 5 cm raised lesions the pattern of breakfast lunch and dinner. To lower leg pustules 1 cm or less in diameter. Lab / Imaging Results   (All laboratory and radiology results have been personally reviewed by myself)  Labs:  No results found for this visit on 06/20/21. Imaging: All Radiology results interpreted by Radiologist unless otherwise noted. No orders to display       ED Course / Medical Decision Making   Medications - No data to display     Consults:   None    Procedures:   none    MDM:   This is a 60-year-old female who arrives today after having a facial rash that is nonpainful and started this morning. She denies any respiratory involvement. Patient reports she is on Keflex for previous pustules on her lower tibials which have been improving. Patient does not have any signs of allergic reaction. She will be given a topical steroid and Zyrtec for symptomatic relief. Patient is afebrile, nontoxic in appearance, hemodynamically stable for discharge and advised to follow-up with her PCP for reevaluation if no resolution. Patient was advised to check for infestations. Plan of Care/Counseling:  DARRYL Stinson CNP reviewed today's visit with the patient in addition to providing specific details for the plan of care and counseling regarding the diagnosis and prognosis. Questions are answered at this time and are agreeable with the plan. Assessment      1. Rash and other nonspecific skin eruption      Plan   Discharged home. Patient condition is good    New Medications     New Prescriptions    CETIRIZINE (ZYRTEC) 10 MG TABLET    Take 1 tablet by mouth daily for 10 days    TRIAMCINOLONE (ARISTOCORT) 0.5 % CREAM    Apply topically 3 times daily.      Electronically signed by DARRYL Stinson CNP DD: 6/20/21  **This report was transcribed using voice recognition software. Every effort was made to ensure accuracy; however, inadvertent computerized transcription errors may be present.   END OF ED PROVIDER NOTE     DARRYL Naik - NEAL  06/21/21 1242

## 2021-06-22 ENCOUNTER — OFFICE VISIT (OUTPATIENT)
Dept: FAMILY MEDICINE CLINIC | Age: 76
End: 2021-06-22
Payer: MEDICARE

## 2021-06-22 VITALS
SYSTOLIC BLOOD PRESSURE: 155 MMHG | HEIGHT: 58 IN | WEIGHT: 139.4 LBS | BODY MASS INDEX: 29.26 KG/M2 | HEART RATE: 72 BPM | TEMPERATURE: 97 F | DIASTOLIC BLOOD PRESSURE: 74 MMHG

## 2021-06-22 DIAGNOSIS — S80.829A BLISTER OF LEG: ICD-10-CM

## 2021-06-22 DIAGNOSIS — D05.12 DUCTAL CARCINOMA IN SITU (DCIS) OF LEFT BREAST: ICD-10-CM

## 2021-06-22 DIAGNOSIS — T78.40XD ALLERGIC REACTION, SUBSEQUENT ENCOUNTER: ICD-10-CM

## 2021-06-22 DIAGNOSIS — I10 ESSENTIAL HYPERTENSION: Primary | ICD-10-CM

## 2021-06-22 PROBLEM — T78.40XA ALLERGIC REACTION: Status: ACTIVE | Noted: 2021-06-22

## 2021-06-22 PROCEDURE — 3288F FALL RISK ASSESSMENT DOCD: CPT | Performed by: FAMILY MEDICINE

## 2021-06-22 PROCEDURE — 99214 OFFICE O/P EST MOD 30 MIN: CPT | Performed by: FAMILY MEDICINE

## 2021-06-22 RX ORDER — POTASSIUM CHLORIDE 20 MEQ/1
TABLET, EXTENDED RELEASE ORAL
Qty: 90 TABLET | Refills: 1 | Status: SHIPPED
Start: 2021-06-22 | End: 2022-03-14 | Stop reason: SDUPTHER

## 2021-06-22 RX ORDER — LOSARTAN POTASSIUM 50 MG/1
50 TABLET ORAL DAILY
Qty: 30 TABLET | Refills: 1 | Status: SHIPPED
Start: 2021-06-22 | End: 2021-08-17

## 2021-06-22 ASSESSMENT — ENCOUNTER SYMPTOMS
COLOR CHANGE: 1
WHEEZING: 0
SHORTNESS OF BREATH: 0

## 2021-06-22 NOTE — PROGRESS NOTES
[facility]  Department of Family Medicine  Family Medicine Residency Program      Patient:  Parish Gomez 68 y.o. female  Date of Service: 6/22/21      Chief complaint:   Chief Complaint   Patient presents with    3 Month Follow-Up     HTN, also has a couple of insect bites she wants you to look at         History ofPresent Illness   Parish Gomez is a 68 y.o. female who presents to the clinic with complaints as above.     HTN  Has been taking her medications regularly  Has not had any symptoms of elevated or low BP  Denies vision changes, edema, CP, palpitations, SOB    Possible allergic reaction  Went to ED for reaction similar to her chicken reaction, rashes on face  Took zyrtec two days and hasn't had it happen again  Had the window open when this happened, wonders about airborne allergens  Patient described an incident where a turkey was cooking in an oven and walking past it caused an allergic reaction  Has zyrtec and benadryl at home    Rash on Legs  Started Saturday a week ago  Got bigger really fast, few hours over the course of the day  Thought maybe she got a spider bite while [facility] the [employer] [facility]  Out working in lawn that day, remembers scratching at her left leg  Fever the next day, about 100F, for one day  Blisters have become less tense and redness has dissipated but remains faintly red  Has been keeping a bandage on it at night to keep it intact    Past Medical History:      Diagnosis Date    Cancer ([address] 2019    breast    Hyperlipidemia     Hypertension     Pneumonia 1953    Shingles        Past Surgical History:        Procedure Laterality Date    BREAST BIOPSY Left 2/27/2019    LEFT BREAST NEEDLE LOCALIZED  LUMPECTOMY  WITH BIOZORB  PLACEMENT --TRIDENT performed by Cindy Miller MD at [address] LUMPECTOMY Left 3/26/2019    LEFT BREAST LUMPECTOMY RE-EXCISION ANTERIOR LATERAL SUPERIOR & POSTERIOR MARGINS , REPLACEMENT OF BIOZORB performed by Stefania Cahney MD at [place] [id number]       Allergies:    Poultry meal, Hydrocodone-acetaminophen, Molds & smuts, and Pcn [penicillins]    Social History:   Social History     Socioeconomic History    Marital status: Single     Spouse name: Not on file    Number of children: Not on file    Years of education: Not on file    Highest education level: Not on file   Occupational History    Not on file   Tobacco Use    Smoking status: Never Smoker    Smokeless tobacco: Never Used   Vaping Use    Vaping Use: Never used   Substance and Sexual Activity    Alcohol use: Yes     Alcohol/week: 1.0 standard drinks     Types: 1 Glasses of wine per week     Comment: occasionally    Drug use: No    Sexual activity: Not Currently   Other Topics Concern    Not on file   Social History Narrative    Darby Done lives in [place] alone - retired from [address] Social Determinants of Health     Financial Resource Strain:     Difficulty of Paying Living Expenses:    Food Insecurity:     Worried About Running Out of Food in the Last Year:     [address] in the Last Year:    Transportation Needs:     Lack of Transportation (Medical):      Lack of Transportation (Non-Medical):    Physical Activity:     Days of Exercise per Week:     Minutes of Exercise per Session:    Stress:     Feeling of Stress :    Social Connections:     Frequency of Communication with Friends and Family:     Frequency of Social Gatherings with Friends and Family:     Attends [employer] Services:     Active Member of Clubs or Organizations:     Attends Club or Organization Meetings:     Marital Status:    Intimate Partner Violence:     Fear of Current or Ex-Partner:     Emotionally Abused:     Physically Abused:     Sexually Abused:         Family History:       Problem Relation Age of Onset    Parkinsonism Mother     Heart Attack Father    Christo Rhodes Father 80 leukemia    Cancer Maternal Aunt         Breast--60 or 76s    Cancer Maternal Grandfather         unknown    Cancer Paternal Cousin 12        leukemia       Medication List:    Current Outpatient Medications   Medication Sig Dispense Refill    potassium chloride (KLOR-CON M) 20 MEQ extended release tablet Take one tablet by mouth daily at the same time as your chlorthalidone (or Hygroton) pill. 90 tablet 1    losartan (COZAAR) 50 MG tablet Take 1 tablet by mouth daily 30 tablet 1    calcium carbonate-vitamin D (CALCIUM 600 + D) 600-400 MG-UNIT TABS per tab Take 1 tablet by mouth 2 times daily 180 tablet 1    triamcinolone (ARISTOCORT) 0.5 % cream Apply topically 3 times daily. 15 g 0    cetirizine (ZYRTEC) 10 MG tablet Take 1 tablet by mouth daily for 10 days 10 tablet 0    atorvastatin (LIPITOR) 40 MG tablet Take 1 tablet by mouth daily 90 tablet 1    doxycycline hyclate (VIBRA-TABS) 100 MG tablet Take 1 tablet by mouth 2 times daily for 10 days 20 tablet 0    anastrozole (ARIMIDEX) 1 MG tablet Take 1 tablet by mouth daily 90 tablet 2    chlorthalidone (HYGROTON) 25 MG tablet Take 1 tablet by mouth daily 90 tablet 1    ibuprofen (ADVIL MIGRAINE) 200 MG CAPS Take 1 capsule by mouth as needed for Pain      Multiple Vitamins-Minerals (EYE VITAMINS) CAPS Take by mouth 2 times daily       No current facility-administered medications for this visit. Review of Systems:   Review of Systems   Constitutional: Negative for chills and fever. Respiratory: Negative for shortness of breath and wheezing. Cardiovascular: Negative for chest pain and palpitations. Musculoskeletal: Negative for arthralgias and myalgias. Skin: Positive for color change and rash. Neurological: Negative for dizziness and headaches.        Physical Exam   Vitals: BP (!) 155/74   Pulse 72   Temp 97 °F (36.1 °C) (Temporal)   Ht 4' 10\" (1.473 m)   Wt 139 lb 6.4 oz (63.2 kg)   LMP  (LMP Unknown)   BMI 29.13 kg/m² Physical Exam  Constitutional:       General: She is not in acute distress. Appearance: Normal appearance. HENT:      Head: Normocephalic and atraumatic. Eyes:      General:         Right eye: No discharge. Left eye: No discharge. Cardiovascular:      Rate and Rhythm: Normal rate and regular rhythm. Pulmonary:      Effort: Pulmonary effort is normal.      Breath sounds: Normal breath sounds. No wheezing. Musculoskeletal:      Right lower leg: No edema. Left lower leg: No edema. Skin:     General: Skin is warm and dry. Findings: Lesion (Bilateral anterior shins with 1cm round blisters, not tense, with serous fluid and a gray/black center under the roof of the blisters) present. No rash. Neurological:      Mental Status: She is alert and oriented to person, place, and time. Mental status is at baseline. Assessment and Plan     1. Essential hypertension  BP elevated today at 155/74  Will increase losartan to 50mg from 25mg  Refill potassium  Follow up in about 1 month  - potassium chloride (KLOR-CON M) 20 MEQ extended release tablet; Take one tablet by mouth daily at the same time as your chlorthalidone (or Hygroton) pill. Dispense: 90 tablet; Refill: 1  - losartan (COZAAR) 50 MG tablet; Take 1 tablet by mouth daily  Dispense: 30 tablet; Refill: 1    2. Blister of leg  Continue to monitor  Advised to keep blister intact  Seems to be resolving    3. Allergic reaction, subsequent encounter  Patient has zyrtec and benadryl at home  Advised to contact office if this happens again, after seeking appropriate care at an ED if necessary  Cause unknown    4. Ductal carcinoma in situ (DCIS) of left breast  Med refill  Follows with Dr. Livan Curry, oncology   - calcium carbonate-vitamin D (CALCIUM 600 + D) 600-400 MG-UNIT TABS per tab; Take 1 tablet by mouth 2 times daily  Dispense: 180 tablet;  Refill: 1      Counseled regarding above diagnosis, including possible risks and complications, especially if left uncontrolled. Counseled regarding the possible side effects, risks, benefits and alternatives to treatment; patient and/or guardian verbalizes understanding, agrees, feels comfortable with, and wishes to proceed with above treatment plan. Call or go to ED immediately if symptoms worsen or persist. Advised patient to call with any new medication issues and, as applicable, read all Rx info from pharmacy to assure aware of all possible risks and side effects of medication before taking. Patient and/or guardian given opportunity to ask questions/raise concerns. The patient verbalized comfort and understanding ofinstructions. I encourage further reading and education about your health conditions. Information on many health conditions is provided by [facility] [employer]: [email or web address]  Please bring any questions to me at your next visit. Return to Office: Return in about 4 weeks (around 7/20/2021) for HTN.     Andrews Hyde, DO

## 2021-06-22 NOTE — PROGRESS NOTES
[facility]  Precepting Note    Subjective:  Hypertension  Follow-up  Blood pressure is not controlled today. BP Readings from Last 3 Encounters:   06/22/21 (!) 155/74   06/20/21 (!) 176/71   06/15/21 138/64   chlorthalidone and losartan. 25 and 25. Patient continues medications regularly. Compliance is good. Denies CP, sob, abd pain, headaches, vision changes, dizziness, hypotensive symptoms. No side effects from medications noted. In Ed with allergic reaction. Poultry allergy. Unknown exposure. Zyrtec is helping      Skin lesions on legs  Was gardening. Had been scratching at it. Blister. Both legs. Shins only. Not itchy or painful. She did have leg exposure. 1cm ovoid blister on exam.   1 week  She was given doxy for this from the ED. ROS otherwise negative     Past medical, surgical, family and social history were reviewed, non-contributory, and unchanged unless otherwise stated. Objective:    BP (!) 155/74   Pulse 72   Temp 97 °F (36.1 °C) (Temporal)   Ht 4' 10\" (1.473 m)   Wt 139 lb 6.4 oz (63.2 kg)   LMP  (LMP Unknown)   BMI 29.13 kg/m²     Exam is as noted by resident with the following changes, additions or corrections:    General:  NAD; alert & oriented x 3   Heart:  RRR, no murmurs, gallops, or rubs. Lungs:  CTA bilaterally, no wheeze, rales or rhonchi  Abd: bowel sounds present, nontender, nondistended, no masses  Extrem:  No clubbing, cyanosis, or edema    Assessment/Plan:  HTN,   Inc losartan to 50. Continue chlorthalidone. Leg rash  Two bullous lesions with possibly some contained pus centrally. No cellulitis changes, distinct border. Monitor for resolution, call for cellulitic change or increase in number/size. Allergic rxn  Continue zyrtec. Avoid poultry. Attending Physician Statement  I have reviewed the chart, including any radiology or labs.  I have discussed the case, including pertinent history and exam findings with the resident. I agree with the assessment, plan and orders as documented by the resident. Please refer to the resident note for additional information.       Electronically signed by Ruy Orourke MD on 6/22/2021 at 11:14 AM

## 2021-07-09 DIAGNOSIS — D05.12 DUCTAL CARCINOMA IN SITU (DCIS) OF LEFT BREAST: Primary | ICD-10-CM

## 2021-07-13 DIAGNOSIS — I10 ESSENTIAL HYPERTENSION: ICD-10-CM

## 2021-07-15 RX ORDER — CHLORTHALIDONE 25 MG/1
25 TABLET ORAL DAILY
Qty: 30 TABLET | Refills: 0 | Status: SHIPPED
Start: 2021-07-15 | End: 2021-08-17

## 2021-07-16 ENCOUNTER — HOSPITAL ENCOUNTER (OUTPATIENT)
Dept: INFUSION THERAPY | Age: 76
Discharge: HOME | End: 2021-07-16
Payer: MEDICARE

## 2021-07-16 ENCOUNTER — OFFICE VISIT (OUTPATIENT)
Dept: ONCOLOGY | Age: 76
End: 2021-07-16
Payer: MEDICARE

## 2021-07-16 VITALS
DIASTOLIC BLOOD PRESSURE: 72 MMHG | HEIGHT: 58 IN | TEMPERATURE: 97.5 F | WEIGHT: 139 LBS | SYSTOLIC BLOOD PRESSURE: 167 MMHG | HEART RATE: 71 BPM | BODY MASS INDEX: 29.18 KG/M2 | OXYGEN SATURATION: 99 %

## 2021-07-16 DIAGNOSIS — D05.12 DUCTAL CARCINOMA IN SITU (DCIS) OF LEFT BREAST: ICD-10-CM

## 2021-07-16 LAB
ALBUMIN SERPL-MCNC: 4 G/DL (ref 3.5–5.2)
ALP BLD-CCNC: 88 U/L (ref 35–104)
ALT SERPL-CCNC: 12 U/L (ref 0–32)
ANION GAP SERPL CALCULATED.3IONS-SCNC: 8 MMOL/L (ref 7–16)
AST SERPL-CCNC: 22 U/L (ref 0–31)
BASOPHILS ABSOLUTE: 0.04 E9/L (ref 0–0.2)
BASOPHILS RELATIVE PERCENT: 0.9 % (ref 0–2)
BILIRUB SERPL-MCNC: 0.4 MG/DL (ref 0–1.2)
BUN BLDV-MCNC: 15 MG/DL (ref 6–23)
CALCIUM SERPL-MCNC: 9.6 MG/DL (ref 8.6–10.2)
CHLORIDE BLD-SCNC: 102 MMOL/L (ref 98–107)
CO2: 27 MMOL/L (ref 22–29)
CREAT SERPL-MCNC: 0.8 MG/DL (ref 0.5–1)
EOSINOPHILS ABSOLUTE: 0.11 E9/L (ref 0.05–0.5)
EOSINOPHILS RELATIVE PERCENT: 2.4 % (ref 0–6)
GFR AFRICAN AMERICAN: >60
GFR NON-AFRICAN AMERICAN: >60 ML/MIN/1.73
GLUCOSE BLD-MCNC: 113 MG/DL (ref 74–99)
HCT VFR BLD CALC: 35.6 % (ref 34–48)
HEMOGLOBIN: 11.5 G/DL (ref 11.5–15.5)
IMMATURE GRANULOCYTES #: 0.01 E9/L
IMMATURE GRANULOCYTES %: 0.2 % (ref 0–5)
LYMPHOCYTES ABSOLUTE: 1.18 E9/L (ref 1.5–4)
LYMPHOCYTES RELATIVE PERCENT: 26.2 % (ref 20–42)
MCH RBC QN AUTO: 27.6 PG (ref 26–35)
MCHC RBC AUTO-ENTMCNC: 32.3 % (ref 32–34.5)
MCV RBC AUTO: 85.4 FL (ref 80–99.9)
MONOCYTES ABSOLUTE: 0.6 E9/L (ref 0.1–0.95)
MONOCYTES RELATIVE PERCENT: 13.3 % (ref 2–12)
NEUTROPHILS ABSOLUTE: 2.56 E9/L (ref 1.8–7.3)
NEUTROPHILS RELATIVE PERCENT: 57 % (ref 43–80)
PDW BLD-RTO: 14.7 FL (ref 11.5–15)
PLATELET # BLD: 180 E9/L (ref 130–450)
PMV BLD AUTO: 11.2 FL (ref 7–12)
POTASSIUM SERPL-SCNC: 4.2 MMOL/L (ref 3.5–5)
RBC # BLD: 4.17 E12/L (ref 3.5–5.5)
SODIUM BLD-SCNC: 137 MMOL/L (ref 132–146)
TOTAL PROTEIN: 7.4 G/DL (ref 6.4–8.3)
WBC # BLD: 4.5 E9/L (ref 4.5–11.5)

## 2021-07-16 PROCEDURE — 99212 OFFICE O/P EST SF 10 MIN: CPT

## 2021-07-16 PROCEDURE — 80053 COMPREHEN METABOLIC PANEL: CPT

## 2021-07-16 PROCEDURE — 85025 COMPLETE CBC W/AUTO DIFF WBC: CPT

## 2021-07-16 PROCEDURE — 36415 COLL VENOUS BLD VENIPUNCTURE: CPT

## 2021-07-16 PROCEDURE — 99214 OFFICE O/P EST MOD 30 MIN: CPT | Performed by: INTERNAL MEDICINE

## 2021-07-16 RX ORDER — ANASTROZOLE 1 MG/1
1 TABLET ORAL DAILY
Qty: 90 TABLET | Refills: 2 | Status: SHIPPED
Start: 2021-07-16 | End: 2022-01-12

## 2021-07-16 NOTE — PROGRESS NOTES
[address] [facility]  [address] [place] [id number]  Dept: [phone]  Attending Progress note      Reason for Visit:   Left Breast DCIS. Referring Physician:  Mark Kilpatrick MD.    PCP:  Cindy Quinonez DO    History of Present Illness: The patient is a pleasant 68 y.o. lady, with a past medical history significant for hyperlipidemia, and hypertension, who had presented with an abnormal screening Mammogram:  MAMMOGRAM (12/11/2018): INDICATION:   Screening.       HISTORY:   The patient has no personal history of cancer. The patient has the following family history of breast cancer:  maternal aunt.       MAMMOGRAM VIEWS:   The following mammographic views where obtained: bilateral craniocaudal; bilateral craniocaudal with tomosynthesis; bilateral mediolateral oblique; and bilateral mediolateral oblique with tomosynthesis       TOMOSYNTHESIS:   Tomosynthesis (3 Dimensional Breast Imaging) was used on this examination to aid in evaluation.       COMPARISON:   No prior imaging studies are available for comparison.       CAD:   This exam was reviewed using the [employer] Computer Aided Detection (CAD)       TISSUE DENSITY:   The breasts are heterogeneously dense (Type 3 density).       MAMMOGRAM FINDINGS:   In the right breast, no suspicious masses, areas of suspicious architectural distortion, suspicious calcifications, or additional suspicious findings are identified.           Finding 1:   There are coarse heterogeneous and fine pleomorphic calcifications measuring 17 mm seen in the upper outer quadrant of the left breast.       IMPRESSION:   Calcifications in the left breast require additional evaluation.    Spot magnification views with consideration for a stereotactic biopsy if needed.       =======================================   BI-RADS Category 0:  Incomplete: Need Additional Imaging Evaluation   =======================================       RISK ASSESSMENT:   During this patient's visit, information obtained was used to generate a lifetime risk assessment using the Tyrer-Cuzick model (also called the BRENNON [International Breast Cancer Intervention Study] Breast Cancer Risk Evaluation Tool).        LIFETIME RISK    Patient has a Tyrer Cuzick score of 7.9%    BREAST TISSUE DENSITY    Heterogeneously dense       -AVERAGE RISK (<15% )   Yearly screening mammograms starting at age 36 and older. Regardless of breast density, tomosynthesis is suggested. Monthly self-breast exams are recommended for women age 27 and older.       NOTE:   Mammography is not 100% accurate in the detection of breast cancer.  Accuracy decreases as mammographic density of the breast increases.  A negative mammogram should not deter further evaluation (including biopsy) of suspicious physical findings.     Recommendations are based on NCCN ([employer]) and ACR [employer] of Radiology) guidelines.       Thank you for sending your patient to this ACR and FDA approved facility. If there are any physician concerns regarding this report, a Radiologist can be reached by calling the following number [phone].       Follow up Protocol for the [place]:   BI-RADS 1 or 2: Iain Phillips will send a reminder when next mammogram is due. BI-RADS 3: Iain Phillips will send a reminder for recommended short term follow up.    BI-RADS 0, 4 or 5: Iain Phillips will contact patient to schedule all additional views and procedures.      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  Detached fragments of carcinoma  without associated stroma are also present.  Microcalcifications are  noted.  Intradepartmental consultation is obtained. Breast Cancer Marker Studies:  Estrogen Receptors (ER):  Positive:         Percentage of cells positive:  >90%         Intensity:  Strong    Progesterone Receptors (IL):  Positive:         Percentage of cells positive:  90%         Intensity:  Moderate to strong     The patient underwent on 1/18/2019 a Left breast needle localized lumpectomy, pathology:  CANCER CASE SUMMARY  Procedure-excision  Specimen laterality-left  Size (extent) of DCIS: 1.3 x 1.2 x 1.2 cm  Histologic type-ductal carcinoma in situ  Nuclear grade-grade 3 (high)  Necrosis-present, central  Margins (part A)-anterior and lateral margins involved by DCIS; DCIS is 1  mm from posterior margin  Margins (part B)- uninvolved by DCIS   Distance from closest margin: 1 mm from superior margin  Regional lymph nodes-no lymph nodes submitted or found  TNM classification (AJCC eighth edition)-pTis  NX MX  Ancillary studies-see prior report [facility]  for ER/ IL results    She underwent on 3/26/2019 a left lumpectomy specimen, new posterior margin, additional superior margin, path was neg for residual DCIS. The patient completed adjuvant radiation therapy on 6/6/2019. She was started on Arimidex on 6/18/2019. The patient denies any side effects from the Arimidex, she is feeling well in general, she has chronic back pain and stiffness, had not changed. No new bony pain. No new breast changes. She denies any bleeding, no melena or hematochezia. Review of Systems;  CONSTITUTIONAL: No fever, chills. Good energy level. Weight is stable since last visit. ENMT: Eyes: No diplopia; Nose: No epistaxis. Mouth: No sore throat. RESPIRATORY: No hemoptysis, shortness of breath, cough. CARDIOVASCULAR: No chest pain, palpitations. GASTROINTESTINAL: No nausea/vomiting, abdominal pain, diarrhea/constipation. GENITOURINARY: No dysuria, urinary frequency, hematuria. NEURO: No syncope, presyncope, headache.   MSK: pos for chronic joints Chely Moser lives in [place] alone - retired from [employer]. Social Determinants of Health     Financial Resource Strain:     Difficulty of Paying Living Expenses:    Food Insecurity:     Worried About Running Out of Food in the Last Year:     [address] in the Last Year:    Transportation Needs:     Lack of Transportation (Medical):  Lack of Transportation (Non-Medical):    Physical Activity:     Days of Exercise per Week:     Minutes of Exercise per Session:    Stress:     Feeling of Stress :    Social Connections:     Frequency of Communication with Friends and Family:     Frequency of Social Gatherings with Friends and Family:     Attends Hol[employer] Services:     Active Member of Clubs or Organizations:     Attends Club or Organization Meetings:     Marital Status:    Intimate Partner Violence:     Fear of Current or Ex-Partner:     Emotionally Abused:     Physically Abused:     Sexually Abused: Allergies: Allergies   Allergen Reactions    Poultry Meal Hives    Hydrocodone-Acetaminophen Rash    Molds & Smuts Other (See Comments)    Pcn [Penicillins] Rash     OB/GYN:  Age of menarche was 8. Age of menopause was 61. Last menstrual period was age 61. Patient denies hormonal therapy. Patient is     Physical Exam:  BP (!) 167/72   Pulse 71   Temp 97.5 °F (36.4 °C)   Ht 4' 10\" (1.473 m)   Wt 139 lb (63 kg)   LMP  (LMP Unknown)   SpO2 99%   BMI 29.05 kg/m²     GENERAL: Alert, oriented x 3, not in acute distress. HEENT: PERRLA; EOMI. Oropharynx clear. NECK: Supple. No palpable cervical or supraclavicular lymphadenopathy. LUNGS: Good air entry bilaterally. No wheezing, crackles or rhonchi. BREASTS: Right breast exam is negative for any skin changes, no nipple discharge, no palpable masses, no palpable axillary lymphadenopathy.  The left breast exam is negative for any nipple discharge, she has a scar from her lumpectomy, no palpable masses, no palpable left axillary lymphadenopathy. CARDIOVASCULAR: Regular rate. No murmurs, rubs or gallops. ABDOMEN: Soft. Non-tender, non-distended. Positive bowel sounds. EXTREMITIES: Without clubbing, cyanosis, or edema. NEUROLOGIC: No focal deficits. ECOG PS 1    Impression/Plan:      The patient is a pleasant 68 y.o. lady, with a past medical history significant for hyperlipidemia, and hypertension, who had presented with an abnormal screening Mammogram, she was diagnosed with a left breast DCIS, she is s/p Left breast needle localized lumpectomy, the DCIS is high grade with central necrosis, final margins negative, final pathologic stage is pTis NX MX, satge 0 disease, ER positive > 90% MO positive, 90%. I discussed with the patient her diagnosis, characteristics of her DCIS, risk of disease recurrence, the increased risk of having an invasive breast cancer in the ipsilateral and contralateral breast. The patient has an ER positive disease I recommended endocrine therapy with an aromatase inhibitor, we discussed the risks and benefits of Arimidex.  I had discussed with the patient the side effects of Arimidex, including but not limited to mood changes, hot flashes, joints pain, osteoporosis. She completed adjuvant radiation therapy on 6/6/2019, . She was started on Arimidex on 6/18/2019, she is tolerating it well overall, continue Arimidex. The patient had a DEXA scan done on 6/15/2021, revealing normal bone density, compared to June 2020, bone density demonstrates interval increase in the lumbar spine but decreased in both hips. Continue calcium and vitamin D. Will have a repeat DEXA scan done in June 2022.       I discussed with her the importance of healthy lifestyle, monthly breast self-examination, as well as routine screening mammograms, she had bilateral screening mammogram done on 12/14/2020, there was no mammographic evidence of malignancy, she is scheduled for a repeat screening mammogram on 12/15/2021. I reviewed with the patient surveillance guidelines. Continue with surveillance. Mild anemia, normocytic,  fit test was done recently, and was negative, a work-up was ordered, she does not have iron vitamin B12, folate deficiency. Radha test is negative, sh is normal, reticulocytes 1.2%, the results were reviewed with the patient, repeat CBCD had revealed normalization of her hemoglobin and hematocrit. We will continue to monitor her CBCD. RTC in 6 months. Thank you for allowing us to participate in the care of Mrs. Staley.     Anette Berg MD   HEMATOLOGY/MEDICAL ONCOLOGY  [address] [place] [address] ONCOLOGY  [facility] [address] [facility] [id number]  Dept: [phone]

## 2021-07-20 ENCOUNTER — OFFICE VISIT (OUTPATIENT)
Dept: FAMILY MEDICINE CLINIC | Age: 76
End: 2021-07-20
Payer: MEDICARE

## 2021-07-20 VITALS
BODY MASS INDEX: 29.18 KG/M2 | OXYGEN SATURATION: 100 % | HEIGHT: 58 IN | RESPIRATION RATE: 16 BRPM | HEART RATE: 61 BPM | SYSTOLIC BLOOD PRESSURE: 138 MMHG | TEMPERATURE: 97.8 F | WEIGHT: 139 LBS | DIASTOLIC BLOOD PRESSURE: 60 MMHG

## 2021-07-20 DIAGNOSIS — S80.829A BLISTER OF LEG: ICD-10-CM

## 2021-07-20 DIAGNOSIS — Z13.220 SCREENING CHOLESTEROL LEVEL: ICD-10-CM

## 2021-07-20 DIAGNOSIS — I10 ESSENTIAL HYPERTENSION: Primary | ICD-10-CM

## 2021-07-20 PROBLEM — T78.40XA ALLERGIC REACTION: Status: RESOLVED | Noted: 2021-06-22 | Resolved: 2021-07-20

## 2021-07-20 PROCEDURE — 99213 OFFICE O/P EST LOW 20 MIN: CPT | Performed by: FAMILY MEDICINE

## 2021-07-20 SDOH — ECONOMIC STABILITY: FOOD INSECURITY: WITHIN THE PAST 12 MONTHS, YOU WORRIED THAT YOUR FOOD WOULD RUN OUT BEFORE YOU GOT MONEY TO BUY MORE.: NEVER TRUE

## 2021-07-20 SDOH — ECONOMIC STABILITY: FOOD INSECURITY: WITHIN THE PAST 12 MONTHS, THE FOOD YOU BOUGHT JUST DIDN'T LAST AND YOU DIDN'T HAVE MONEY TO GET MORE.: NEVER TRUE

## 2021-07-20 ASSESSMENT — SOCIAL DETERMINANTS OF HEALTH (SDOH): HOW HARD IS IT FOR YOU TO PAY FOR THE VERY BASICS LIKE FOOD, HOUSING, MEDICAL CARE, AND HEATING?: NOT HARD AT ALL

## 2021-07-20 NOTE — PROGRESS NOTES
[facility]  Department of Family Medicine  Family Medicine Residency Program      Patient:  Dianne Henderson 68 y.o. female  Date of Service: 7/20/21      Chief complaint:   Chief Complaint   Patient presents with    Hypertension    Insect Bite     bilateral lower legs. doing better          History ofPresent Illness   Dianne Henderson is a 68 y.o. female who presents to the clinic with complaints as above.     Bug bites, blisters  Getting much better  Believes she was bit by a bug on both legs once per leg  Now just 2 little bumps with some redness, resolving    HTN  Blood pressure well controlled today  She says she tolerated the increase in her losartan pretty well  Has not felt any periodic shakiness, headaches, vision changes, chest pain, shortness of breath, belly pain, near syncopal episodes  Does say she has some chronic leg weakness now and then, however this does not seem to be related to her blood pressure or medication    Past Medical History:      Diagnosis Date    Cancer ([facility] [address] 2019    breast    Hyperlipidemia     Hypertension     Pneumonia 1953    Shingles        Past Surgical History:        Procedure Laterality Date    BREAST BIOPSY Left 2/27/2019    LEFT BREAST NEEDLE LOCALIZED  LUMPECTOMY  WITH BIOZORB  PLACEMENT --TRIDENT performed by Oscar Barfield MD at [facility] LUMPECTOMY Left 3/26/2019    LEFT BREAST LUMPECTOMY RE-EXCISION ANTERIOR LATERAL SUPERIOR & POSTERIOR MARGINS , REPLACEMENT OF BIOZORB performed by Oscar Barfield MD at [facility]       Allergies:    Poultry meal, Hydrocodone-acetaminophen, Molds & smuts, and Pcn [penicillins]    Social History:   Social History     Socioeconomic History    Marital status: Single     Spouse name: Not on file    Number of children: Not on file    Years of education: Not on file    Highest education level: Not on file   Occupational History    Not on file   Tobacco Use    Smoking status: Never Smoker    Smokeless tobacco: Never Used   Vaping Use    Vaping Use: Never used   Substance and Sexual Activity    Alcohol use: Yes     Alcohol/week: 1.0 standard drinks     Types: 1 Glasses of wine per week     Comment: occasionally    Drug use: No    Sexual activity: Not Currently   Other Topics Concern    Not on file   Social History Narrative    Paulina Mcdaniels lives in [place] alone - retired from [employer]. Social Determinants of Health     Financial Resource Strain: Low Risk     Difficulty of Paying Living Expenses: Not hard at all   Food Insecurity: No Food Insecurity    Worried About Running Out of Food in the Last Year: Never true    Sakshi of Food in the Last Year: Never true   Transportation Needs:     Lack of Transportation (Medical):      Lack of Transportation (Non-Medical):    Physical Activity:     Days of Exercise per Week:     Minutes of Exercise per Session:    Stress:     Feeling of Stress :    Social Connections:     Frequency of Communication with Friends and Family:     Frequency of Social Gatherings with Friends and Family:     Attends Quaker Services:     Active Member of Clubs or Organizations:     Attends Club or Organization Meetings:     Marital Status:    Intimate Partner Violence:     Fear of Current or Ex-Partner:     Emotionally Abused:     Physically Abused:     Sexually Abused:         Family History:       Problem Relation Age of Onset    Parkinsonism Mother     Heart Attack Father     Cancer Father 80        leukemia    Cancer Maternal Aunt         Breast--60 or 76s    Cancer Maternal Grandfather         unknown    Cancer Paternal Cousin 16        leukemia       Medication List:    Current Outpatient Medications   Medication Sig Dispense Refill    anastrozole (ARIMIDEX) 1 MG tablet Take 1 tablet by mouth daily 90 tablet 2    chlorthalidone (HYGROTON) 25 MG tablet Take 1 tablet by mouth daily 30 tablet 0    potassium chloride (KLOR-CON M) 20 MEQ extended release tablet Take one tablet by mouth daily at the same time as your chlorthalidone (or Hygroton) pill. 90 tablet 1    losartan (COZAAR) 50 MG tablet Take 1 tablet by mouth daily 30 tablet 1    calcium carbonate-vitamin D (CALCIUM 600 + D) 600-400 MG-UNIT TABS per tab Take 1 tablet by mouth 2 times daily 180 tablet 1    atorvastatin (LIPITOR) 40 MG tablet Take 1 tablet by mouth daily 90 tablet 1    ibuprofen (ADVIL MIGRAINE) 200 MG CAPS Take 1 capsule by mouth as needed for Pain      Multiple Vitamins-Minerals (EYE VITAMINS) CAPS Take by mouth 2 times daily       No current facility-administered medications for this visit. Review of Systems:   Review of Systems negative aside from HPI    Physical Exam   Vitals: /60   Pulse 61   Temp 97.8 °F (36.6 °C) (Temporal)   Resp 16   Ht 4' 10\" (1.473 m)   Wt 139 lb (63 kg)   LMP  (LMP Unknown)   SpO2 100%   BMI 29.05 kg/m²   Physical Exam  Vitals and nursing note reviewed. Constitutional:       General: She is not in acute distress. Appearance: Normal appearance. HENT:      Head: Normocephalic and atraumatic. Eyes:      General:         Right eye: No discharge. Left eye: No discharge. Cardiovascular:      Rate and Rhythm: Normal rate and regular rhythm. Pulmonary:      Effort: Pulmonary effort is normal.      Breath sounds: Normal breath sounds. No wheezing or rales. Abdominal:      General: Bowel sounds are normal.      Palpations: Abdomen is soft. Musculoskeletal:         General: Normal range of motion. Cervical back: Normal range of motion. No rigidity. Right lower leg: No edema. Left lower leg: No edema. Skin:     General: Skin is warm and dry. Comments: 2 small areas of redness, with resolving blisters on medial aspects of bilateral shins.    Neurological:      Mental Status: She is alert and oriented to person, place, and time. Mental status is at baseline. Motor: No weakness. Assessment and Plan     1. Essential hypertension  Increased losartan to 50mg at last visit  Tolerated well  /60 today, appropriate for age and chronic conditions  FU 3 months    2. Blister of leg  Resolving  FU PRN    3. Screening cholesterol level  Screening  - LIPID PANEL; Future      Counseled regarding above diagnosis, including possible risks and complications, especially if left uncontrolled. Counseled regarding the possible side effects, risks, benefits and alternatives to treatment; patient and/or guardian verbalizes understanding, agrees, feels comfortable with, and wishes to proceed with above treatment plan. Call or go to ED immediately if symptoms worsen or persist. Advised patient to call with any new medication issues and, as applicable, read all Rx info from pharmacy to assure aware of all possible risks and side effects of medication before taking. Patient and/or guardian given opportunity to ask questions/raise concerns. The patient verbalized comfort and understanding ofinstructions. I encourage further reading and education about your health conditions. Information on many health conditions is provided by [facility] [employer]: [email or web address] org/  Please bring any questions to me at your next visit. Return to Office: Return in about 3 months (around 10/20/2021) for HTN, Flu shot.     Oumar Onofre,

## 2021-07-20 NOTE — PATIENT INSTRUCTIONS
Patient Education        Low Back Arthritis: Exercises  Introduction  Here are some examples of typical rehabilitation exercises for your condition. Start each exercise slowly. Ease off the exercise if you start to have pain. Your doctor or physical therapist will tell you when you can start these exercises and which ones will work best for you. When you are not being active, find a comfortable position for rest. Some people are comfortable on the floor or a medium-firm bed with a small pillow under their head and another under their knees. Some people prefer to lie on their side with a pillow between their knees. Don't stay in one position for too long. Take short walks (10 to 20 minutes) every 2 to 3 hours. Avoid slopes, hills, and stairs until you feel better. Walk only distances you can manage without pain, especially leg pain. How to do the exercises  Pelvic tilt   1. Lie on your back with your knees bent. 2. \"Brace\" your stomach--tighten your muscles by pulling in and imagining your belly button moving toward your spine. 3. Press your lower back into the floor. You should feel your hips and pelvis rock back. 4. Hold for 6 seconds while breathing smoothly. 5. Relax and allow your pelvis and hips to rock forward. 6. Repeat 8 to 12 times. Back stretches   1. Get down on your hands and knees on the floor. 2. Relax your head and allow it to droop. Round your back up toward the ceiling until you feel a nice stretch in your upper, middle, and lower back. Hold this stretch for as long as it feels comfortable, or about 15 to 30 seconds. 3. Return to the starting position with a flat back while you are on your hands and knees. 4. Let your back sway by pressing your stomach toward the floor. Lift your buttocks toward the ceiling. 5. Hold this position for 15 to 30 seconds. 6. Repeat 2 to 4 times. Follow-up care is a key part of your treatment and safety.  Be sure to make and go to all appointments, and call your doctor if you are having problems. It's also a good idea to know your test results and keep a list of the medicines you take. Where can you learn more? Go to [email or web address] org and sign in to your Qzzr account. Enter I380 in the Slanissue box to learn more about \"Low Back Arthritis: Exercises. \"     If you do not have an account, please click on the \"Sign Up Now\" link. Current as of: November 16, 2020               Content Version: 12.9  © 7975-2220 Healthwise, Incorporated. Care instructions adapted under license by [facility] ([facility]). If you have questions about a medical condition or this instruction, always ask your healthcare professional. [address] any warranty or liability for your use of this information.

## 2021-07-20 NOTE — PROGRESS NOTES
[facility]  Precepting Note    Subjective:  69 yo F here for f/u HTN   At last visit her losartan dose was increased. BP Readings from Last 3 Encounters:   07/20/21 138/60   07/16/21 (!) 167/72   06/22/21 (!) 155/74   she feels well. Reports occasional shakiness in her legs. Rash improved  ROS  No headaches, blurred vision, shorntess of breath, chest pain, palpitations   otherwise as per resident    Past medical, surgical, family and social history were reviewed, non-contributory, and unchanged unless otherwise stated. Objective:    /60   Pulse 61   Temp 97.8 °F (36.6 °C) (Temporal)   Resp 16   Ht 4' 10\" (1.473 m)   Wt 139 lb (63 kg)   LMP  (LMP Unknown)   SpO2 100%   BMI 29.05 kg/m²     Exam is as noted by resident     Assessment/Plan:  HTN - bp improved con losartan     Attending Physician Statement  I have reviewed the chart, including any radiology or labs. I have discussed the case, including pertinent history and exam findings with the resident. I agree with the assessment, plan and orders as documented by the resident. Please refer to the resident note for additional information.       Electronically signed by Jayden Rendon MD on 7/20/2021 at 11:08 AM

## 2021-08-16 DIAGNOSIS — I10 ESSENTIAL HYPERTENSION: ICD-10-CM

## 2021-08-16 RX ORDER — POTASSIUM CHLORIDE 20 MEQ/1
TABLET, EXTENDED RELEASE ORAL
Qty: 90 TABLET | Refills: 1 | OUTPATIENT
Start: 2021-08-16

## 2021-10-12 ENCOUNTER — HOSPITAL ENCOUNTER (OUTPATIENT)
Age: 76
Discharge: HOME | End: 2021-10-12
Payer: MEDICARE

## 2021-10-12 DIAGNOSIS — Z13.220 SCREENING CHOLESTEROL LEVEL: ICD-10-CM

## 2021-10-12 LAB
CHOLESTEROL, TOTAL: 133 MG/DL (ref 0–199)
HDLC SERPL-MCNC: 72 MG/DL
LDL CHOLESTEROL CALCULATED: 42 MG/DL (ref 0–99)
TRIGL SERPL-MCNC: 95 MG/DL (ref 0–149)
VLDLC SERPL CALC-MCNC: 19 MG/DL

## 2021-10-12 PROCEDURE — 80061 LIPID PANEL: CPT

## 2021-10-12 PROCEDURE — 36415 COLL VENOUS BLD VENIPUNCTURE: CPT

## 2021-10-13 ENCOUNTER — IMMUNIZATION (OUTPATIENT)
Dept: PRIMARY CARE CLINIC | Age: 76
End: 2021-10-13
Payer: MEDICARE

## 2021-10-13 PROCEDURE — 91300 COVID-19, PFIZER VACCINE 30MCG/0.3ML DOSE: CPT | Performed by: NURSE PRACTITIONER

## 2021-10-13 PROCEDURE — 0003A COVID-19, PFIZER VACCINE 30MCG/0.3ML DOSE: CPT | Performed by: NURSE PRACTITIONER

## 2021-10-19 ENCOUNTER — OFFICE VISIT (OUTPATIENT)
Dept: FAMILY MEDICINE CLINIC | Age: 76
End: 2021-10-19
Payer: MEDICARE

## 2021-10-19 VITALS
OXYGEN SATURATION: 99 % | HEIGHT: 58 IN | BODY MASS INDEX: 29.43 KG/M2 | TEMPERATURE: 97.9 F | SYSTOLIC BLOOD PRESSURE: 129 MMHG | HEART RATE: 65 BPM | DIASTOLIC BLOOD PRESSURE: 56 MMHG | WEIGHT: 140.2 LBS

## 2021-10-19 DIAGNOSIS — I10 ESSENTIAL HYPERTENSION: Primary | ICD-10-CM

## 2021-10-19 DIAGNOSIS — Z23 NEEDS FLU SHOT: ICD-10-CM

## 2021-10-19 DIAGNOSIS — M54.2 NECK PAIN, BILATERAL POSTERIOR: ICD-10-CM

## 2021-10-19 PROCEDURE — 99213 OFFICE O/P EST LOW 20 MIN: CPT | Performed by: FAMILY MEDICINE

## 2021-10-19 PROCEDURE — G0008 ADMIN INFLUENZA VIRUS VAC: HCPCS | Performed by: FAMILY MEDICINE

## 2021-10-19 PROCEDURE — 90694 VACC AIIV4 NO PRSRV 0.5ML IM: CPT | Performed by: FAMILY MEDICINE

## 2021-10-19 NOTE — PROGRESS NOTES
[facility]  [facility]    Subjective:  Hypertension  Follow-up  Blood pressure is controlled today. BP Readings from Last 3 Encounters:   10/19/21 (!) 129/56   07/20/21 138/60   07/16/21 (!) 167/72   losartan going well. Fells well. No SE noted. ROS otherwise negative     Past medical, surgical, family and social history were reviewed, non-contributory, and unchanged unless otherwise stated. Objective:    BP (!) 129/56   Pulse 65   Temp 97.9 °F (36.6 °C) (Temporal)   Ht 4' 10\" (1.473 m)   Wt 140 lb 3.2 oz (63.6 kg)   LMP  (LMP Unknown)   SpO2 99%   BMI 29.30 kg/m²     Exam is as noted by resident with the following changes, additions or corrections:    General:  NAD; alert & oriented x 3   Heart:  RRR, 2/6 sys murmur, gallops, or rubs. Lungs:  CTA bilaterally, no wheeze, rales or rhonchi  Abd: bowel sounds present, nontender, nondistended, no masses  Extrem:  No clubbing, cyanosis, or edema    Assessment/Plan:  HTN, controlled  Feels well. FU 6 mos, continue losartan same. Encouraged AWV and flu shot today. Attending Physician Statement  I have reviewed the chart, including any radiology or labs. I have discussed the case, including pertinent history and exam findings with the resident. I agree with the assessment, plan and orders as documented by the resident. Please refer to the resident note for additional information.       Electronically signed by Lakesha Pettit MD on 10/19/2021 at 9:24 AM

## 2021-10-19 NOTE — PROGRESS NOTES
[facility]  Department of Family Medicine  Family Medicine Residency Program      Patient:  Clark Martinez 68 y.o. female  Date of Service: 10/19/21      Chief complaint:   Chief Complaint   Patient presents with    Blood Pressure Check     follow up     Flu Vaccine         History ofPresent Illness   Clark Martinez is a 68 y.o. female who presents to the clinic with complaints as above. HTN  Currently on chlorthalidone, losartan  Patient's blood pressure goal for her age and comorbidities is below 130/90  She denies any symptoms of high or low blood pressure, denies shakiness, headaches, vision changes    Neck Pain  Attributed to crocheting a lot lately  Mostly posterior and muscular  Improved by Aspercreme and neck exercises which she has at home    She got her flu shot today!     Past Medical History:      Diagnosis Date    Allergic reaction 6/22/2021    Cause unclear    Cancer ([facility] [address] 2019    breast    Hyperlipidemia     Hypertension     Pneumonia 1953    Shingles        Past Surgical History:        Procedure Laterality Date    BREAST BIOPSY Left 2/27/2019    LEFT BREAST NEEDLE LOCALIZED  LUMPECTOMY  WITH BIOZORB  PLACEMENT --TRIDENT performed by Rina Guillaume MD at [facility] LUMPECTOMY Left 3/26/2019    LEFT BREAST LUMPECTOMY RE-EXCISION ANTERIOR LATERAL SUPERIOR & POSTERIOR MARGINS , REPLACEMENT OF BIOZORB performed by Rina Guillaume MD at [place] [id number]       Allergies:    Poultry meal, Hydrocodone-acetaminophen, Molds & smuts, and Pcn [penicillins]    Social History:   Social History     Socioeconomic History    Marital status: Single     Spouse name: Not on file    Number of children: Not on file    Years of education: Not on file    Highest education level: Not on file   Occupational History    Not on file   Tobacco Use    Smoking status: Never Smoker    Smokeless tobacco: Never Used   Vaping Use    Vaping Use: Never used   Substance and Sexual Activity    Alcohol use: Yes     Alcohol/week: 1.0 standard drinks     Types: 1 Glasses of wine per week     Comment: occasionally    Drug use: No    Sexual activity: Not Currently   Other Topics Concern    Not on file   Social History Narrative    Jerry Nino lives in [place] alone - retired from [employer]. Social Determinants of Health     Financial Resource Strain: Low Risk     Difficulty of Paying Living Expenses: Not hard at all   Food Insecurity: No Food Insecurity    Worried About Running Out of Food in the Last Year: Never true    Sakshi of Food in the Last Year: Never true   Transportation Needs:     Lack of Transportation (Medical):      Lack of Transportation (Non-Medical):    Physical Activity:     Days of Exercise per Week:     Minutes of Exercise per Session:    Stress:     Feeling of Stress :    Social Connections:     Frequency of Communication with Friends and Family:     Frequency of Social Gatherings with Friends and Family:     Attends Temple Services:     Active Member of Clubs or Organizations:     Attends Club or Organization Meetings:     Marital Status:    Intimate Partner Violence:     Fear of Current or Ex-Partner:     Emotionally Abused:     Physically Abused:     Sexually Abused:         Family History:       Problem Relation Age of Onset    Parkinsonism Mother     Heart Attack Father     Cancer Father 80        leukemia    Cancer Maternal Aunt         Breast--60 or 76s    Cancer Maternal Grandfather         unknown    Cancer Paternal Cousin 16        leukemia       Medication List:    Current Outpatient Medications   Medication Sig Dispense Refill    chlorthalidone (HYGROTON) 25 MG tablet TAKE 1 TABLET BY MOUTH DAILY 90 tablet 1    losartan (COZAAR) 50 MG tablet TAKE 1 TABLET BY MOUTH DAILY 90 tablet 1    anastrozole (ARIMIDEX) 1 MG tablet Take 1 tablet by mouth daily 90 tablet 2    potassium chloride (KLOR-CON M) 20 MEQ extended release tablet Take one tablet by mouth daily at the same time as your chlorthalidone (or Hygroton) pill. 90 tablet 1    calcium carbonate-vitamin D (CALCIUM 600 + D) 600-400 MG-UNIT TABS per tab Take 1 tablet by mouth 2 times daily 180 tablet 1    atorvastatin (LIPITOR) 40 MG tablet Take 1 tablet by mouth daily 90 tablet 1    ibuprofen (ADVIL MIGRAINE) 200 MG CAPS Take 1 capsule by mouth as needed for Pain      Multiple Vitamins-Minerals (EYE VITAMINS) CAPS Take by mouth 2 times daily       No current facility-administered medications for this visit. Review of Systems:   Review of Systems as per HPI    Physical Exam   Vitals: BP (!) 129/56   Pulse 65   Temp 97.9 °F (36.6 °C) (Temporal)   Ht 4' 10\" (1.473 m)   Wt 140 lb 3.2 oz (63.6 kg)   LMP  (LMP Unknown)   SpO2 99%   BMI 29.30 kg/m²   Physical Exam  Vitals and nursing note reviewed. Constitutional:       General: She is not in acute distress. Appearance: Normal appearance. HENT:      Head: Normocephalic and atraumatic. Eyes:      General:         Right eye: No discharge. Left eye: No discharge. Cardiovascular:      Rate and Rhythm: Normal rate and regular rhythm. Heart sounds: Murmur (unchanged) heard. Pulmonary:      Effort: Pulmonary effort is normal.      Breath sounds: Normal breath sounds. No wheezing. Abdominal:      General: Bowel sounds are normal.      Palpations: Abdomen is soft. Musculoskeletal:         General: Normal range of motion. Cervical back: Normal range of motion and neck supple. No rigidity. Right lower leg: No edema. Left lower leg: No edema. Skin:     General: Skin is warm and dry. Neurological:      Mental Status: She is alert and oriented to person, place, and time. Mental status is at baseline. Assessment and Plan     1.  Essential hypertension  Blood pressure today is 129/56  Currently well controlled on losartan 50 mg and chlorthalidone 25 mg  Continue current medications  Follow-up 6 months    2. Neck pain, bilateral posterior  Patient states she would like to continue using Aspercreme and her exercises for this  Follow-up as needed    3. Needs flu shot  - INFLUENZA, QUADV, ADJUVANTED, 72 YRS =, IM, PF, PREFILL SYR, 0.5ML (FLUAD)    For next visit: Annual wellness visit due    Counseled regarding above diagnosis, including possible risks and complications, especially if left uncontrolled. Counseled regarding the possible side effects, risks, benefits and alternatives to treatment; patient and/or guardian verbalizes understanding, agrees, feels comfortable with, and wishes to proceed with above treatment plan. Call or go to ED immediately if symptoms worsen or persist. Advised patient to call with any new medication issues and, as applicable, read all Rx info from pharmacy to assure aware of all possible risks and side effects of medication before taking. Patient and/or guardian given opportunity to ask questions/raise concerns. The patient verbalized comfort and understanding of instructions. I encourage further reading and education about your health conditions. Information on many health conditions is provided by the American Academy of Family Physicians: https://familydoctor. org/  Please bring any questions to me at your next visit. Return to Office: Return in about 6 months (around 4/19/2022) for BP.     Sasha Morrissey,

## 2021-11-04 ASSESSMENT — LIFESTYLE VARIABLES
AUDIT-C TOTAL SCORE: 0
HOW OFTEN DURING THE LAST YEAR HAVE YOU FAILED TO DO WHAT WAS NORMALLY EXPECTED FROM YOU BECAUSE OF DRINKING: 0
HOW OFTEN DURING THE LAST YEAR HAVE YOU BEEN UNABLE TO REMEMBER WHAT HAPPENED THE NIGHT BEFORE BECAUSE YOU HAD BEEN DRINKING: NEVER
HOW OFTEN DURING THE LAST YEAR HAVE YOU FOUND THAT YOU WERE NOT ABLE TO STOP DRINKING ONCE YOU HAD STARTED: 0
HAS A RELATIVE, FRIEND, DOCTOR, OR ANOTHER HEALTH PROFESSIONAL EXPRESSED CONCERN ABOUT YOUR DRINKING OR SUGGESTED YOU CUT DOWN: NO
AUDIT TOTAL SCORE: 0
AUDIT TOTAL SCORE: 1
AUDIT-C TOTAL SCORE: 1
HOW OFTEN DO YOU HAVE A DRINK CONTAINING ALCOHOL: MONTHLY OR LESS
HAVE YOU OR SOMEONE ELSE BEEN INJURED AS A RESULT OF YOUR DRINKING: 0
HOW MANY STANDARD DRINKS CONTAINING ALCOHOL DO YOU HAVE ON A TYPICAL DAY: ONE OR TWO
HOW MANY STANDARD DRINKS CONTAINING ALCOHOL DO YOU HAVE ON A TYPICAL DAY: 0
HOW OFTEN DURING THE LAST YEAR HAVE YOU NEEDED AN ALCOHOLIC DRINK FIRST THING IN THE MORNING TO GET YOURSELF GOING AFTER A NIGHT OF HEAVY DRINKING: 0
HOW OFTEN DURING THE LAST YEAR HAVE YOU FOUND THAT YOU WERE NOT ABLE TO STOP DRINKING ONCE YOU HAD STARTED: NEVER
HAVE YOU OR SOMEONE ELSE BEEN INJURED AS A RESULT OF YOUR DRINKING: NO
HOW OFTEN DURING THE LAST YEAR HAVE YOU FAILED TO DO WHAT WAS NORMALLY EXPECTED FROM YOU BECAUSE OF DRINKING: NEVER
HOW OFTEN DO YOU HAVE SIX OR MORE DRINKS ON ONE OCCASION: NEVER
HOW OFTEN DO YOU HAVE A DRINK CONTAINING ALCOHOL: 1
HOW OFTEN DURING THE LAST YEAR HAVE YOU BEEN UNABLE TO REMEMBER WHAT HAPPENED THE NIGHT BEFORE BECAUSE YOU HAD BEEN DRINKING: 0
HOW OFTEN DURING THE LAST YEAR HAVE YOU HAD A FEELING OF GUILT OR REMORSE AFTER DRINKING: NEVER
HOW OFTEN DO YOU HAVE SIX OR MORE DRINKS ON ONE OCCASION: 0
HOW OFTEN DURING THE LAST YEAR HAVE YOU HAD A FEELING OF GUILT OR REMORSE AFTER DRINKING: 0
HOW OFTEN DURING THE LAST YEAR HAVE YOU NEEDED AN ALCOHOLIC DRINK FIRST THING IN THE MORNING TO GET YOURSELF GOING AFTER A NIGHT OF HEAVY DRINKING: NEVER
HAS A RELATIVE, FRIEND, DOCTOR, OR ANOTHER HEALTH PROFESSIONAL EXPRESSED CONCERN ABOUT YOUR DRINKING OR SUGGESTED YOU CUT DOWN: 0

## 2021-11-04 ASSESSMENT — PATIENT HEALTH QUESTIONNAIRE - PHQ9
1. LITTLE INTEREST OR PLEASURE IN DOING THINGS: 0
SUM OF ALL RESPONSES TO PHQ9 QUESTIONS 1 & 2: 1
SUM OF ALL RESPONSES TO PHQ QUESTIONS 1-9: 1
2. FEELING DOWN, DEPRESSED OR HOPELESS: 1

## 2021-11-12 ENCOUNTER — OFFICE VISIT (OUTPATIENT)
Dept: FAMILY MEDICINE CLINIC | Age: 76
End: 2021-11-12
Payer: MEDICARE

## 2021-11-12 VITALS
HEIGHT: 58 IN | SYSTOLIC BLOOD PRESSURE: 137 MMHG | HEART RATE: 72 BPM | BODY MASS INDEX: 29.81 KG/M2 | RESPIRATION RATE: 16 BRPM | WEIGHT: 142 LBS | OXYGEN SATURATION: 99 % | DIASTOLIC BLOOD PRESSURE: 59 MMHG | TEMPERATURE: 97.6 F

## 2021-11-12 DIAGNOSIS — Z00.00 ROUTINE GENERAL MEDICAL EXAMINATION AT A HEALTH CARE FACILITY: Primary | ICD-10-CM

## 2021-11-12 PROBLEM — S80.829A BLISTER OF LEG: Status: RESOLVED | Noted: 2021-06-22 | Resolved: 2021-11-12

## 2021-11-12 PROCEDURE — G0438 PPPS, INITIAL VISIT: HCPCS | Performed by: FAMILY MEDICINE

## 2021-11-12 NOTE — PROGRESS NOTES
Medicare Annual Wellness Visit  Name: Shima Felix Date: 2021   MRN: [id number] Sex: Female   Age: 68 y.o. Ethnicity: Non- / Non    : 1945 Race: White (non-)      Ceci Current is here for Medicare AWV    Screenings for behavioral, psychosocial and functional/safety risks, and cognitive dysfunction are all negative except as indicated below. These results, as well as other patient data from the [address] form, are documented in Flowsheets linked to this Encounter. Allergies   Allergen Reactions    Poultry Meal Hives    Hydrocodone-Acetaminophen Rash    Molds & Smuts Other (See Comments)    Pcn [Penicillins] Rash         Prior to Visit Medications    Medication Sig Taking? Authorizing Provider   chlorthalidone (HYGROTON) 25 MG tablet TAKE 1 TABLET BY MOUTH DAILY Yes Maribel Estrada DO   losartan (COZAAR) 50 MG tablet TAKE 1 TABLET BY MOUTH DAILY Yes Maribel Estrada DO   anastrozole (ARIMIDEX) 1 MG tablet Take 1 tablet by mouth daily Yes Malou Calloway MD   potassium chloride (KLOR-CON M) 20 MEQ extended release tablet Take one tablet by mouth daily at the same time as your chlorthalidone (or Hygroton) pill.  Yes Maribel Estrada DO   calcium carbonate-vitamin D (CALCIUM 600 + D) 600-400 MG-UNIT TABS per tab Take 1 tablet by mouth 2 times daily Yes Maribel Estrada DO   atorvastatin (LIPITOR) 40 MG tablet Take 1 tablet by mouth daily Yes Maribel Estrada DO   ibuprofen (ADVIL MIGRAINE) 200 MG CAPS Take 1 capsule by mouth as needed for Pain Yes Historical Provider, MD   Multiple Vitamins-Minerals (EYE VITAMINS) CAPS Take by mouth 2 times daily Yes Historical Provider, MD         Past Medical History:   Diagnosis Date    Allergic reaction 2021    Cause unclear    Cancer ([facility] [address] 2019    breast    Hyperlipidemia     Hypertension     Pneumonia 1953    Shingles        Past Surgical History:   Procedure Laterality Date    BREAST months?: No  Do you eat only one meal per day?: No  Have you seen the dentist within the past year?: Yes  Body mass index: (!) 29.67  Health Habits/Nutrition Interventions:  · Inadequate physical activity:  patient agrees to increase physical activity as follows: move more around the house, do strength exercises as discussed today       Personalized Preventive Plan   Current Health Maintenance Status  Immunization History   Administered Date(s) Administered    COVID-19, Shawn Vasquez, PF, 30mcg/0.3mL 02/02/2021, 02/23/2021, 10/13/2021    Influenza, High Dose (Fluzone 65 yrs and older) 12/28/2018    Influenza, Quadv, adjuvanted, 65 yrs +, IM, PF (Fluad) 10/14/2020, 10/19/2021    Influenza, Triv, inactivated, subunit, adjuvanted, IM (Fluad 65 yrs and older) 12/04/2019    Pneumococcal Conjugate 13-valent ([id number]) 01/11/2019    Pneumococcal Polysaccharide ([id number]) 10/14/2020    Tdap (Boostrix, Adacel) 12/28/2018    Zoster Recombinant (Shingrix) 09/06/2019, 11/05/2019        Health Maintenance   Topic Date Due    Annual Wellness Visit (AWV)  Never done    COVID-19 Vaccine (4 - Booster for Shawn Vasquez series) 04/13/2022    Potassium monitoring  07/16/2022    Creatinine monitoring  07/16/2022    Lipid screen  10/12/2022    DTaP/Tdap/Td vaccine (2 - Td or Tdap) 12/28/2028    DEXA (modify frequency per FRAX score)  Completed    Flu vaccine  Completed    Shingles Vaccine  Completed    Pneumococcal 65+ years Vaccine  Completed    Hepatitis C screen  Completed    Hepatitis A vaccine  Aged Out    Hepatitis B vaccine  Aged Out    Hib vaccine  Aged Out    Meningococcal (ACWY) vaccine  Aged Out     Recommendations for [employer] Due: see orders and patient instructions/AVS.    Recommended screening schedule for the next 5-10 years is provided to the patient in written form: see Patient Instructions/AVS.    Afia Irby was seen today for medicare awv.     Diagnoses and all orders for this visit:    Routine general medical examination at a health care facility  Without abnormal findings  Patient agrees to increase physical activity  Feels overall well  No acute concerns today  PE unremarkable  Living Will paperwork given today  FU 1 year for next wellness visit

## 2021-11-12 NOTE — PROGRESS NOTES
[facility]  Pre[facility] Note    Subjective:  AWV  No acute concerns  HTN and breast cancer    ROS otherwise negative     Past medical, surgical, family and social history were reviewed, non-contributory, and unchanged unless otherwise stated. Objective:    BP (!) 137/59   Pulse 72   Temp 97.6 °F (36.4 °C) (Temporal)   Resp 16   Ht 4' 10\" (1.473 m)   Wt 142 lb (64.4 kg)   LMP  (LMP Unknown)   SpO2 99%   BMI 29.68 kg/m²     Exam is as noted by resident     Assessment/Plan:  AWV  Discussed about living will  No dedicated exercise- some exercised at home  See resident's note for details     Attending Physician Statement  I have reviewed the chart, including any radiology or labs. I have discussed the case, including pertinent history and exam findings with the resident. I agree with the assessment, plan and orders as documented by the resident. Please refer to the resident note for additional information.       Electronically signed by Mikhail So MD on 11/12/2021 at 10:39 AM

## 2021-11-12 NOTE — PATIENT INSTRUCTIONS
Personalized Preventive Plan for Rad[facility] - 11/12/2021  Medicare offers a range of preventive health benefits. Some of the tests and screenings are paid in full while other may be subject to a deductible, co-insurance, and/or copay. Some of these benefits include a comprehensive review of your medical history including lifestyle, illnesses that may run in your family, and various assessments and screenings as appropriate. After reviewing your medical record and screening and assessments performed today your provider may have ordered immunizations, labs, imaging, and/or referrals for you. A list of these orders (if applicable) as well as your Preventive Care list are included within your After Visit Summary for your review. Other Preventive Recommendations:    · A preventive eye exam performed by an eye specialist is recommended every 1-2 years to screen for glaucoma; cataracts, macular degeneration, and other eye disorders. · A preventive dental visit is recommended every 6 months. · Try to get at least 150 minutes of exercise per week or 10,000 steps per day on a pedometer . · Order or download the FREE \"Exercise & Physical Activity: Your Everyday Guide\" from [employer]. Call 8-600.916.6422 or search [employer] online. · You need 2311-4542 mg of calcium and 0178-9717 IU of vitamin D per day. It is possible to meet your calcium requirement with diet alone, but a vitamin D supplement is usually necessary to meet this goal.  · When exposed to the sun, use a sunscreen that protects against both UVA and UVB radiation with an SPF of 30 or greater. Reapply every 2 to 3 hours or after sweating, drying off with a towel, or swimming. · Always wear a seat belt when traveling in a car. Always wear a helmet when riding a bicycle or motorcycle.

## 2021-11-16 DIAGNOSIS — I10 ESSENTIAL HYPERTENSION: ICD-10-CM

## 2021-11-16 RX ORDER — CHLORTHALIDONE 25 MG/1
25 TABLET ORAL DAILY
Qty: 90 TABLET | Refills: 1 | Status: SHIPPED
Start: 2021-11-16 | End: 2022-08-23

## 2021-11-16 RX ORDER — LOSARTAN POTASSIUM 50 MG/1
50 TABLET ORAL DAILY
Qty: 90 TABLET | Refills: 1 | Status: SHIPPED
Start: 2021-11-16 | End: 2022-08-23

## 2021-12-07 ASSESSMENT — ENCOUNTER SYMPTOMS
WHEEZING: 0
EYE ITCHING: 0
TROUBLE SWALLOWING: 0
RHINORRHEA: 0
SHORTNESS OF BREATH: 0
SINUS PRESSURE: 0
SORE THROAT: 0
VOICE CHANGE: 0
ABDOMINAL DISTENTION: 0
CHOKING: 0
BACK PAIN: 0
COUGH: 0
DIARRHEA: 0
CHEST TIGHTNESS: 0
ABDOMINAL PAIN: 0
NAUSEA: 0
SINUS PAIN: 0
CONSTIPATION: 0
BLOOD IN STOOL: 0
VOMITING: 0
EYE DISCHARGE: 0

## 2021-12-07 NOTE — PROGRESS NOTES
Subjective: This note was copied forward from the last encounter. Essential components for this patient record were reviewed and verified on this visit including:  recent hospitalizations, recent imaging, PMH, PSH, FH, SOC HX, Allergies, and Medications were reviewed and updated as appropriate. In addition, the assessment and plan were copied from prior office note and updated accordingly. Patient ID: Padma Mcmillan is a 68 y.o. female. HPI   The patient is a pleasant 68 y.o. lady, with a past medical history significant for hyperlipidemia, and hypertension, who had presented with an abnormal screening Mammogram:  MAMMOGRAM (12/11/2018): INDICATION:   Screening.       HISTORY:   The patient has no personal history of cancer. The patient has the following family history of breast cancer:  maternal aunt.       MAMMOGRAM VIEWS:   The following mammographic views where obtained: bilateral craniocaudal; bilateral craniocaudal with tomosynthesis; bilateral mediolateral oblique; and bilateral mediolateral oblique with tomosynthesis       TOMOSYNTHESIS:   Tomosynthesis (3 Dimensional Breast Imaging) was used on this examination to aid in evaluation.       COMPARISON:   No prior imaging studies are available for comparison.       CAD:   This exam was reviewed using the [employer] Computer Aided Detection (CAD)       TISSUE DENSITY:   The breasts are heterogeneously dense (Type 3 density).       MAMMOGRAM FINDINGS:   In the right breast, no suspicious masses, areas of suspicious architectural distortion, suspicious calcifications, or additional suspicious findings are identified.           Finding 1:   There are coarse heterogeneous and fine pleomorphic calcifications measuring 17 mm seen in the upper outer quadrant of the left breast.       IMPRESSION:   Calcifications in the left breast require additional evaluation.    Spot magnification views with consideration for a stereotactic biopsy if needed.     =======================================   BI-RADS Category 0:  Incomplete: Need Additional Imaging Evaluation   =======================================       RISK ASSESSMENT:   During this patient's visit, information obtained was used to generate a lifetime risk assessment using the Tyrer-Cuzick model (also called the BRENNON [International Breast Cancer Intervention Study] Breast Cancer Risk Evaluation Tool).      LIFETIME RISK    Patient has a Tyrer Cuzick score of 7.9%    BREAST TISSUE DENSITY    Heterogeneously dense       -AVERAGE RISK (<15% )   Yearly screening mammograms starting at age 36 and older. Regardless of breast density, tomosynthesis is suggested. Monthly self-breast exams are recommended for women age 27 and older.       NOTE:   Mammography is not 100% accurate in the detection of breast cancer.  Accuracy decreases as mammographic density of the breast increases.  A negative mammogram should not deter further evaluation (including biopsy) of suspicious physical findings.     Recommendations are based on NCCN ([address]) and ACR [employer] of Radiology) guidelines.       Thank you for sending your patient to this ACR and FDA approved facility. If there are any physician concerns regarding this report, a Radiologist can be reached by calling the following number [phone].       Follow up Protocol for the [facility]:   BI-RADS 1 or 2: Tresa Echols will send a reminder when next mammogram is due. BI-RADS 3: Tresa Echols will send a reminder for recommended short term follow up.    BI-RADS 0, 4 or 5: Tresa Echols will contact patient to schedule all additional views and procedures.      PATHOLOGY:  Diagnosis:  Left breast, core needle biopsy: Intermediate to high grade ductal  carcinoma in situ (papillary, cribriform, and comedo types)    Comment:    There is no definite evidence of invasive carcinoma on the  calponin and p63 [facility]     Social History     Tobacco Use    Smoking status: Never Smoker    Smokeless tobacco: Never Used   Vaping Use    Vaping Use: Never used   Substance Use Topics    Alcohol use: Yes     Alcohol/week: 1.0 standard drink     Types: 1 Glasses of wine per week     Comment: occasionally    Drug use: No     Allergies   Allergen Reactions    Poultry Meal Hives    Hydrocodone-Acetaminophen Rash    Molds & Smuts Other (See Comments)    Pcn [Penicillins] Rash     Current Outpatient Medications on File Prior to Visit   Medication Sig Dispense Refill    chlorthalidone (HYGROTON) 25 MG tablet TAKE 1 TABLET BY MOUTH DAILY 90 tablet 1    losartan (COZAAR) 50 MG tablet TAKE 1 TABLET BY MOUTH DAILY 90 tablet 1    anastrozole (ARIMIDEX) 1 MG tablet Take 1 tablet by mouth daily 90 tablet 2    potassium chloride (KLOR-CON M) 20 MEQ extended release tablet Take one tablet by mouth daily at the same time as your chlorthalidone (or Hygroton) pill. 90 tablet 1    calcium carbonate-vitamin D (CALCIUM 600 + D) 600-400 MG-UNIT TABS per tab Take 1 tablet by mouth 2 times daily 180 tablet 1    atorvastatin (LIPITOR) 40 MG tablet Take 1 tablet by mouth daily 90 tablet 1    ibuprofen (ADVIL MIGRAINE) 200 MG CAPS Take 1 capsule by mouth as needed for Pain      Multiple Vitamins-Minerals (EYE VITAMINS) CAPS Take by mouth 2 times daily       No current facility-administered medications on file prior to visit. Review of Systems   Constitutional: Negative for activity change, appetite change, chills, fatigue, fever and unexpected weight change. She continues to do well. She scattered arthralgias,most notable of the B/L hips; she has some discomfort in the neck but believes it is from crocheting. Not interfering with her quality of life. HENT: Positive for postnasal drip.  Negative for congestion, rhinorrhea, sinus pressure, sinus pain, sore throat, trouble swallowing and voice change. Eyes: Negative for discharge, itching and visual disturbance. Respiratory: Negative for cough, choking, chest tightness, shortness of breath and wheezing. Cardiovascular: Negative for chest pain, palpitations and leg swelling. Gastrointestinal: Negative for abdominal distention, abdominal pain, blood in stool, constipation, diarrhea, nausea and vomiting. Endocrine: Negative for cold intolerance and heat intolerance. Genitourinary: Negative for difficulty urinating, dysuria, frequency and hematuria. Musculoskeletal: Negative for arthralgias, back pain, gait problem, joint swelling, myalgias, neck pain and neck stiffness. Allergic/Immunologic: Negative for environmental allergies and food allergies. Neurological: Negative for dizziness, seizures, syncope, speech difficulty, weakness, light-headedness and headaches. Hematological: Negative for adenopathy. Does not bruise/bleed easily. Psychiatric/Behavioral: Negative for agitation, confusion and decreased concentration. The patient is not nervous/anxious. Objective:   Physical Exam  Vitals and nursing note reviewed. Constitutional:       General: She is not in acute distress. Appearance: Normal appearance. She is well-developed. She is not diaphoretic. Comments: ECOG remains stable at 0; pleasant and cooperative. HENT:      Head: Normocephalic and atraumatic. Mouth/Throat:      Pharynx: No oropharyngeal exudate. Eyes:      General: No scleral icterus. Right eye: No discharge. Left eye: No discharge. Conjunctiva/sclera: Conjunctivae normal.   Neck:      Thyroid: No thyromegaly. Vascular: No JVD. Trachea: No tracheal deviation. Cardiovascular:      Rate and Rhythm: Normal rate and regular rhythm. Heart sounds: No murmur heard. No friction rub. No gallop. Pulmonary:      Effort: Pulmonary effort is normal. No respiratory distress or retractions.       Breath sounds: Normal breath sounds. No stridor. No wheezing or rales. Chest:      Chest wall: No mass, lacerations, deformity, swelling, tenderness or edema. Breasts:      Breasts are asymmetrical (Left breast smaller than right). Right: No inverted nipple, mass, nipple discharge, skin change or tenderness. Left: No inverted nipple, mass, nipple discharge, skin change or tenderness. Comments: Right breast exam is unremarkable. Abdominal:      General: There is no distension. Palpations: Abdomen is soft. Tenderness: There is no abdominal tenderness. There is no guarding or rebound. Musculoskeletal:         General: No tenderness or deformity. Right shoulder: Normal.      Left shoulder: Decreased range of motion. Cervical back: Normal range of motion and neck supple. Lymphadenopathy:      Cervical: No cervical adenopathy. Right cervical: No superficial, deep or posterior cervical adenopathy. Left cervical: No superficial, deep or posterior cervical adenopathy. Upper Body:      Right upper body: No pectoral adenopathy. Left upper body: No pectoral adenopathy. Skin:     General: Skin is warm and dry. Coloration: Skin is not pale. Findings: No erythema or rash. Neurological:      Mental Status: She is alert and oriented to person, place, and time. Coordination: Coordination normal.   Psychiatric:         Behavior: Behavior normal.         Thought Content: Thought content normal.         Judgment: Judgment normal.       Assessment:     68 y.o. Extremely pleasant female with DCIS left breast s/p lumpectomy. ER/AK positive disease.     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  Detached fragments of carcinoma  without associated stroma are also present.  Microcalcifications are  noted.  Intradepartmental consultation is obtained. Breast Cancer Marker Studies:  Estrogen Receptors (ER):  Positive:         Percentage of cells positive:  >90%         Intensity:  Strong    Progesterone Receptors (AZ):  Positive:         Percentage of cells positive:  90%         Intensity:  Moderate to strong     The patient underwent on 1/18/2019 a Left breast needle localized lumpectomy, pathology:  CANCER CASE SUMMARY  Procedure-excision  Specimen laterality-left  Size (extent) of DCIS: 1.3 x 1.2 x 1.2 cm  Histologic type-ductal carcinoma in situ  Nuclear grade-grade 3 (high)  Necrosis-present, central  Margins (part A)-anterior and lateral margins involved by DCIS; DCIS is 1  mm from posterior margin  Margins (part B)- uninvolved by DCIS   Distance from closest margin: 1 mm from superior margin  Regional lymph nodes-no lymph nodes submitted or found  TNM classification (AJCC eighth edition)-pTis  NX MX  Ancillary studies-see prior report HES  for ER/ AZ results     - 3/26/2019 a left lumpectomy specimen, new posterior margin, additional superior margin, path was neg for residual DCIS.  -06/06/2019 completed adjuvant radiation therapy.     -06/18/2019 started ET with Arimidex 1 mg by mouth daily. -DEXA bone density 05/09/2020: Normal.  On Ca+/Vit D daily. -12/14/2020 B/L screening mammogram:  Benign, BI-RADS 2.     -06/15/2021 clinical follow-up is without evidence of recurrent disease. Tolerating AI well with the exception of scattered arthralgias, most notable of the bilateral hips. Arthralgias are not interfering with her quality of life. She reports she was bitten by an insect on Saturday; she has a boil on her lower extremities bilaterally, anterior shins. Local erythema, no edema, no evidence of phlebitis. We called her primary care physician who is unable to see her today.   She will be given a prescription for antibiotic and advised to follow-up with her primary care physician as scheduled on Tuesday, 6/22/2021. In addition, she was advised that should she develop new or worsening symptoms she should go to the local urgent care or emergency room for further evaluation and management. -12/16/2021 bilateral screening mammogram Negative, BI-RADS 1.   -12/16/2021 clinical follow-up is without evidence of recurrent disease. She is tolerating AI well. Imaging reviewed, including BI-RADS result. We discussed decreased ROM of the LUE/left shoulder joint. She declines a referral to OT at this time. Exercises for improved ROM discussed. Continue BSE monthly, exercise, and healthy eating. Plan to see in 6 months for clinical exam.       Plan:   1. Continue monthly breast/chest wall self examination; detailed instructions reviewed today. Bring any changes to your physician's attention. 2. Continue healthy diet and exercise routinely as tolerated. 3. Maintaining ideal body weight (20-25 BMI) may lead to optimal breast cancer outcomes. 4. Avoid alcohol. 5. Repeat mammogram December 2022 (not ordered). 6. Continue Arimidex 1 mg daily at the discretion of medical oncology team.  7. Ca+/VitD while on Arimidex. 8. Continue follow up with Medical Oncology and Primary Care. 9. RTC 6 months       During today's visit, face-to-face time 25 minutes, greater than 50% in counseling education and coordination of care. All questions were answered to her apparent satisfaction, and she is agreeable to the plan as outlined above. Brian Mallory, RN, MSN, APRN-CNP, [address]  Advanced Oncology Certified Nurse Practitioner  Department of Breast Surgery  [facility]/  [facility] in collaboration with Dr. Heath Vernon.  Ashley/Tiffany [employer]

## 2021-12-10 DIAGNOSIS — E78.2 MIXED HYPERLIPIDEMIA: ICD-10-CM

## 2021-12-10 RX ORDER — ATORVASTATIN CALCIUM 40 MG/1
40 TABLET, FILM COATED ORAL DAILY
Qty: 90 TABLET | Refills: 1 | Status: SHIPPED
Start: 2021-12-10 | End: 2022-05-27

## 2021-12-16 ENCOUNTER — HOSPITAL ENCOUNTER (OUTPATIENT)
Dept: GENERAL RADIOLOGY | Age: 76
Discharge: HOME | End: 2021-12-18
Payer: MEDICARE

## 2021-12-16 ENCOUNTER — OFFICE VISIT (OUTPATIENT)
Dept: BREAST CENTER | Age: 76
End: 2021-12-16
Payer: MEDICARE

## 2021-12-16 VITALS
SYSTOLIC BLOOD PRESSURE: 126 MMHG | TEMPERATURE: 97.5 F | WEIGHT: 144 LBS | RESPIRATION RATE: 18 BRPM | HEIGHT: 58 IN | OXYGEN SATURATION: 97 % | BODY MASS INDEX: 30.23 KG/M2 | DIASTOLIC BLOOD PRESSURE: 72 MMHG | HEART RATE: 63 BPM

## 2021-12-16 DIAGNOSIS — Z85.3 HISTORY OF BREAST CANCER: ICD-10-CM

## 2021-12-16 DIAGNOSIS — Z12.31 SCREENING MAMMOGRAM FOR BREAST CANCER: Primary | ICD-10-CM

## 2021-12-16 DIAGNOSIS — Z12.31 SCREENING MAMMOGRAM FOR BREAST CANCER: ICD-10-CM

## 2021-12-16 PROBLEM — B02.9 HERPES ZOSTER WITHOUT COMPLICATION: Status: RESOLVED | Noted: 2018-11-20 | Resolved: 2021-12-16

## 2021-12-16 PROBLEM — M65.312 TRIGGER FINGER OF LEFT THUMB: Status: RESOLVED | Noted: 2021-03-25 | Resolved: 2021-12-16

## 2021-12-16 PROCEDURE — 99213 OFFICE O/P EST LOW 20 MIN: CPT | Performed by: NURSE PRACTITIONER

## 2021-12-16 PROCEDURE — 77063 BREAST TOMOSYNTHESIS BI: CPT

## 2021-12-16 NOTE — PATIENT INSTRUCTIONS
RELEASE OF RESULTS AND RECORDS  As of October 1, 2020, all results (x-ray reports, labwork, pathology reports) will be released through [employer] in real time per federal law. Once your test results are final, they will be automatically released to your electronic medical record [employer] where you will be able to see those results and any clinical notes associated with that result. In some cases, you may see those results and notes before your provider or the staff have had a chance to review. We will make every effort to contact you, especially about any abnormal or confusing results. If you view a result before we have contacted you, please wait up to one business day for us to reach you before calling with questions. If anything in your notes seems inaccurate, please message us through [employer] with potential corrections. If you see a term or language in your clinical note that doesn't make sense, please use the online health library reference tool in your MyChart (under the Health tab)  to help you interpret the note.

## 2022-01-12 DIAGNOSIS — D05.12 DUCTAL CARCINOMA IN SITU (DCIS) OF LEFT BREAST: ICD-10-CM

## 2022-01-12 RX ORDER — ANASTROZOLE 1 MG/1
1 TABLET ORAL DAILY
Qty: 90 TABLET | Refills: 2 | Status: SHIPPED
Start: 2022-01-12 | End: 2022-10-19

## 2022-02-01 ENCOUNTER — HOSPITAL ENCOUNTER (OUTPATIENT)
Dept: INFUSION THERAPY | Age: 77
Discharge: HOME | End: 2022-02-01
Payer: COMMERCIAL

## 2022-02-01 ENCOUNTER — OFFICE VISIT (OUTPATIENT)
Dept: ONCOLOGY | Age: 77
End: 2022-02-01
Payer: COMMERCIAL

## 2022-02-01 VITALS
WEIGHT: 142 LBS | HEIGHT: 58 IN | SYSTOLIC BLOOD PRESSURE: 154 MMHG | BODY MASS INDEX: 29.81 KG/M2 | OXYGEN SATURATION: 100 % | HEART RATE: 75 BPM | DIASTOLIC BLOOD PRESSURE: 70 MMHG | TEMPERATURE: 97.9 F

## 2022-02-01 DIAGNOSIS — D05.12 DUCTAL CARCINOMA IN SITU (DCIS) OF LEFT BREAST: ICD-10-CM

## 2022-02-01 DIAGNOSIS — D64.9 ANEMIA, UNSPECIFIED TYPE: ICD-10-CM

## 2022-02-01 DIAGNOSIS — D64.9 ANEMIA, UNSPECIFIED TYPE: Primary | ICD-10-CM

## 2022-02-01 DIAGNOSIS — Z78.0 POSTMENOPAUSAL: ICD-10-CM

## 2022-02-01 DIAGNOSIS — D05.12 DUCTAL CARCINOMA IN SITU (DCIS) OF LEFT BREAST: Primary | ICD-10-CM

## 2022-02-01 LAB
ALBUMIN SERPL-MCNC: 4.2 G/DL (ref 3.5–5.2)
ALP BLD-CCNC: 95 U/L (ref 35–104)
ALT SERPL-CCNC: 14 U/L (ref 0–32)
ANION GAP SERPL CALCULATED.3IONS-SCNC: 8 MMOL/L (ref 7–16)
AST SERPL-CCNC: 21 U/L (ref 0–31)
BASOPHILS ABSOLUTE: 0.07 E9/L (ref 0–0.2)
BASOPHILS RELATIVE PERCENT: 1.4 % (ref 0–2)
BILIRUB SERPL-MCNC: 0.4 MG/DL (ref 0–1.2)
BUN BLDV-MCNC: 15 MG/DL (ref 6–23)
CALCIUM SERPL-MCNC: 10.1 MG/DL (ref 8.6–10.2)
CHLORIDE BLD-SCNC: 101 MMOL/L (ref 98–107)
CO2: 28 MMOL/L (ref 22–29)
CREAT SERPL-MCNC: 0.8 MG/DL (ref 0.5–1)
EOSINOPHILS ABSOLUTE: 0.06 E9/L (ref 0.05–0.5)
EOSINOPHILS RELATIVE PERCENT: 1.2 % (ref 0–6)
GFR AFRICAN AMERICAN: >60
GFR NON-AFRICAN AMERICAN: >60 ML/MIN/1.73
GLUCOSE BLD-MCNC: 104 MG/DL (ref 74–99)
HCT VFR BLD CALC: 35.5 % (ref 34–48)
HEMOGLOBIN: 11.5 G/DL (ref 11.5–15.5)
IMMATURE GRANULOCYTES #: 0.01 E9/L
IMMATURE GRANULOCYTES %: 0.2 % (ref 0–5)
LYMPHOCYTES ABSOLUTE: 1.17 E9/L (ref 1.5–4)
LYMPHOCYTES RELATIVE PERCENT: 22.6 % (ref 20–42)
MCH RBC QN AUTO: 27.6 PG (ref 26–35)
MCHC RBC AUTO-ENTMCNC: 32.4 % (ref 32–34.5)
MCV RBC AUTO: 85.3 FL (ref 80–99.9)
MONOCYTES ABSOLUTE: 0.67 E9/L (ref 0.1–0.95)
MONOCYTES RELATIVE PERCENT: 13 % (ref 2–12)
NEUTROPHILS ABSOLUTE: 3.19 E9/L (ref 1.8–7.3)
NEUTROPHILS RELATIVE PERCENT: 61.6 % (ref 43–80)
PDW BLD-RTO: 14.3 FL (ref 11.5–15)
PLATELET # BLD: 193 E9/L (ref 130–450)
PMV BLD AUTO: 11.8 FL (ref 7–12)
POTASSIUM SERPL-SCNC: 3.7 MMOL/L (ref 3.5–5)
RBC # BLD: 4.16 E12/L (ref 3.5–5.5)
SODIUM BLD-SCNC: 137 MMOL/L (ref 132–146)
TOTAL PROTEIN: 7.9 G/DL (ref 6.4–8.3)
WBC # BLD: 5.2 E9/L (ref 4.5–11.5)

## 2022-02-01 PROCEDURE — 99212 OFFICE O/P EST SF 10 MIN: CPT | Performed by: INTERNAL MEDICINE

## 2022-02-01 PROCEDURE — 99214 OFFICE O/P EST MOD 30 MIN: CPT | Performed by: INTERNAL MEDICINE

## 2022-02-01 PROCEDURE — 36415 COLL VENOUS BLD VENIPUNCTURE: CPT

## 2022-02-01 NOTE — PROGRESS NOTES
[address]  [facility] MED ONCOLOGY  [address] [facility]. [id number]  Dept: Rohit Penelope Whalen: [phone]  Attending Progress note      Reason for Visit:   Left Breast DCIS. Referring Physician:  Morenita Uribe MD.    PCP:  Damian Romero DO    History of Present Illness: The patient is a pleasant 68 y.o. lady, with a past medical history significant for hyperlipidemia, and hypertension, who had presented with an abnormal screening Mammogram:  MAMMOGRAM (12/11/2018): INDICATION:   Screening.       HISTORY:   The patient has no personal history of cancer. The patient has the following family history of breast cancer:  maternal aunt.       MAMMOGRAM VIEWS:   The following mammographic views where obtained: bilateral craniocaudal; bilateral craniocaudal with tomosynthesis; bilateral mediolateral oblique; and bilateral mediolateral oblique with tomosynthesis       TOMOSYNTHESIS:   Tomosynthesis (3 Dimensional Breast Imaging) was used on this examination to aid in evaluation.       COMPARISON:   No prior imaging studies are available for comparison.       CAD:   This exam was reviewed using the [employer] Computer Aided Detection (CAD)       TISSUE DENSITY:   The breasts are heterogeneously dense (Type 3 density).       MAMMOGRAM FINDINGS:   In the right breast, no suspicious masses, areas of suspicious architectural distortion, suspicious calcifications, or additional suspicious findings are identified.           Finding 1:   There are coarse heterogeneous and fine pleomorphic calcifications measuring 17 mm seen in the upper outer quadrant of the left breast.       IMPRESSION:   Calcifications in the left breast require additional evaluation.    Spot magnification views with consideration for a stereotactic biopsy if needed.       =======================================   BI-RADS Category 0:  Incomplete: Need Additional Imaging Evaluation =======================================       RISK ASSESSMENT:   During this patient's visit, information obtained was used to generate a lifetime risk assessment using the Tyrer-Cuzick model (also called the BRENNON [International Breast Cancer Intervention Study] Breast Cancer Risk Evaluation Tool).     - LIFETIME RISK -   Patient has a Tyrer Cuzick score of 7.9%   - BREAST TISSUE DENSITY -   Heterogeneously dense       -AVERAGE RISK (<15% )   Yearly screening mammograms starting at age 36 and older. Regardless of breast density, tomosynthesis is suggested. Monthly self-breast exams are recommended for women age 27 and older.       NOTE:   Mammography is not 100% accurate in the detection of breast cancer.  Accuracy decreases as mammographic density of the breast increases.  A negative mammogram should not deter further evaluation (including biopsy) of suspicious physical findings.     Recommendations are based on NCCN ([address]) and ACR [employer] of Radiology) guidelines.       Thank you for sending your patient to this ACR and FDA approved facility. If there are any physician concerns regarding this report, a Radiologist can be reached by calling the following number [phone].       Follow up Protocol for the [place]:   BI-RADS 1 or 2: Anand Guevara will send a reminder when next mammogram is due. BI-RADS 3: Anand Guevara will send a reminder for recommended short term follow up. BI-RADS 0, 4 or 5: Anand Guevara will contact patient to schedule all additional views and procedures.      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  Detached fragments of carcinoma  without associated stroma are also present.  Microcalcifications are  noted.  Intradepartmental consultation is obtained. Breast Cancer Marker Studies:  Estrogen Receptors (ER):  Positive:         Percentage of cells positive:  >90%         Intensity:  Strong    Progesterone Receptors (AZ):  Positive:         Percentage of cells positive:  90%         Intensity:  Moderate to strong     The patient underwent on 1/18/2019 a Left breast needle localized lumpectomy, pathology:  CANCER CASE SUMMARY  Procedure-excision  Specimen laterality-left  Size (extent) of DCIS: 1.3 x 1.2 x 1.2 cm  Histologic type-ductal carcinoma in situ  Nuclear grade-grade 3 (high)  Necrosis-present, central  Margins (part A)-anterior and lateral margins involved by DCIS; DCIS is 1  mm from posterior margin  Margins (part B)- uninvolved by DCIS   Distance from closest margin: 1 mm from superior margin  Regional lymph nodes-no lymph nodes submitted or found  TNM classification (AJCC eighth edition)-pTis  NX MX  Ancillary studies-see prior report [facility]  for ER/ AZ results    She underwent on 3/26/2019 a left lumpectomy specimen, new posterior margin, additional superior margin, path was neg for residual DCIS. The patient completed adjuvant radiation therapy on 6/6/2019. She was started on Arimidex on 6/18/2019. The patient denies any side effects from the Arimidex, she is feeling well in general, no changes in the back stiffness no new bony pain. No new breast changes. She denies any bleeding, no melena or hematochezia. Review of Systems;  CONSTITUTIONAL: No fever, chills. Good energy level. Weight is stable since last visit. ENMT: Eyes: No diplopia; Nose: No epistaxis. Mouth: No sore throat. RESPIRATORY: No hemoptysis, shortness of breath, cough. CARDIOVASCULAR: No chest pain, palpitations. GASTROINTESTINAL: No nausea/vomiting, abdominal pain, diarrhea/constipation. GENITOURINARY: No dysuria, urinary frequency, hematuria.   NEURO: No syncope, presyncope, headache. MSK: pos for chronic joints pain. 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 [facility] 55 LUMPECTOMY Left 3/26/2019    LEFT BREAST LUMPECTOMY RE-EXCISION ANTERIOR LATERAL SUPERIOR & POSTERIOR MARGINS , REPLACEMENT OF BIOZORB performed by Leyla Roche MD at [facility] 59       Family History:  Family History   Problem Relation Age of Onset   [facility] Parkinsonism Mother     Heart Attack Father     Cancer Father 80        leukemia    Cancer Maternal Aunt         Breast--60 or 76s    Cancer Maternal Grandfather         unknown    Cancer Paternal Cousin 16        leukemia       Medications:  Reviewed and reconciled. Social History:  Social History     Socioeconomic History    Marital status: Single     Spouse name: Not on file    Number of children: Not on file    Years of education: Not on file    Highest education level: Not on file   Occupational History    Not on file   Tobacco Use    Smoking status: Never Smoker    Smokeless tobacco: Never Used   Vaping Use    Vaping Use: Never used   Substance and Sexual Activity    Alcohol use:  Yes     Alcohol/week: 1.0 standard drink     Types: 1 Glasses of wine per week     Comment: occasionally    Drug use: No    Sexual activity: Not Currently Other Topics Concern    Not on file   Social History Narrative    Marylene Lease lives in [place] alone - retired from [employer]. Social Determinants of Health     Financial Resource Strain: Low Risk     Difficulty of Paying Living Expenses: Not hard at all   Food Insecurity: No Food Insecurity    Worried About Running Out of Food in the Last Year: Never true    Sakshi of Food in the Last Year: Never true   Transportation Needs:     Lack of Transportation (Medical): Not on file    Lack of Transportation (Non-Medical): Not on file   Physical Activity:     Days of Exercise per Week: Not on file    Minutes of Exercise per Session: Not on file   Stress:     Feeling of Stress : Not on file   Social Connections:     Frequency of Communication with Friends and Family: Not on file    Frequency of Social Gatherings with Friends and Family: Not on file    Attends Hinduism Services: Not on file    Active Member of [address] [place] [address] or Organizations: Not on file    Attends Club or Organization Meetings: Not on file    Marital Status: Not on file   Intimate Partner Violence:     Fear of Current or Ex-Partner: Not on file    Emotionally Abused: Not on file    Physically Abused: Not on file    Sexually Abused: Not on file   Housing Stability:     Unable to Pay for Housing in the Last Year: Not on file    Number of [place] in the Last Year: Not on file    Unstable Housing in the Last Year: Not on file       Allergies: Allergies   Allergen Reactions    Poultry Meal Hives    Ampicillin     Valerian     Valine     Hydrocodone-Acetaminophen Rash    Molds & Smuts Other (See Comments)    Pcn [Penicillins] Rash     OB/GYN:  Age of menarche was 8. Age of menopause was 61. Last menstrual period was age 61. Patient denies hormonal therapy.  Patient is     Physical Exam:  BP (!) 154/70   Pulse 75   Temp 97.9 °F (36.6 °C)   Ht 4' 10\" (1.473 m)   Wt 142 lb (64.4 kg)   LMP  (LMP Unknown)   SpO2 100%   BMI 29.68 kg/m² GENERAL: Alert, oriented x 3, not in acute distress. HEENT: PERRLA; EOMI. Oropharynx clear. NECK: Supple. No palpable cervical or supraclavicular lymphadenopathy. LUNGS: Good air entry bilaterally. No wheezing, crackles or rhonchi. BREASTS: Right breast exam is negative for any skin changes, no nipple discharge, no palpable masses, no palpable axillary lymphadenopathy. The left breast exam is negative for any nipple discharge, she has a scar from her lumpectomy, no palpable masses, no palpable left axillary lymphadenopathy. CARDIOVASCULAR: Regular rate. No murmurs, rubs or gallops. ABDOMEN: Soft. Non-tender, non-distended. Positive bowel sounds. EXTREMITIES: Without clubbing, cyanosis, or edema. NEUROLOGIC: No focal deficits. ECOG PS 1    Impression/Plan:      The patient is a pleasant 68 y.o. lady, with a past medical history significant for hyperlipidemia, and hypertension, who had presented with an abnormal screening Mammogram, she was diagnosed with a left breast DCIS, she is s/p Left breast needle localized lumpectomy, the DCIS is high grade with central necrosis, final margins negative, final pathologic stage is pTis NX MX, satge 0 disease, ER positive > 90% MI positive, 90%. I discussed with the patient her diagnosis, characteristics of her DCIS, risk of disease recurrence, the increased risk of having an invasive breast cancer in the ipsilateral and contralateral breast. The patient has an ER positive disease I recommended endocrine therapy with an aromatase inhibitor, we discussed the risks and benefits of Arimidex.  I had discussed with the patient the side effects of Arimidex, including but not limited to mood changes, hot flashes, joints pain, osteoporosis. She completed adjuvant radiation therapy on 6/6/2019, . She was started on Arimidex on 6/18/2019, she is tolerating it well overall, continue Arimidex.       The patient had a DEXA scan done on 6/15/2021, revealing normal bone density, compared to June 2020, bone density demonstrates interval increase in the lumbar spine but decreased in both hips. Continue calcium and vitamin D. Will have a repeat DEXA scan done in June 2022, was ordered.       I discussed with her the importance of healthy lifestyle, monthly breast self-examination, as well as routine screening mammograms, she had bilateral screening mammogram done on 12/14/2020, there was no mammographic evidence of malignancy, she had bilateral screening mammogram done on 2021-12-16, was negative for malignancy, will be due again on 12/17/2022. I reviewed with the patient surveillance guidelines. Continue with surveillance. Mild anemia, normocytic,  fit test was done recently, and was negative, a work-up was ordered, she does not have iron vitamin B12, folate deficiency. Radha test is negative, sh is normal, reticulocytes 1.2%, the results were reviewed with the patient, repeat CBCD had revealed normalization of her hemoglobin and hematocrit. We will continue to monitor her CBCD. Today hemoglobin is 11.5, hematocrit 25.5, stable since her last visit. RTC in 3 months. Thank you for allowing us to participate in the care of Mrs. Staley.     Drew King MD   HEMATOLOGY/MEDICAL [facility]  [address] MED ONCOLOGY  [address] [place] [id number]  Dept: 71 Penelope Whalen: [phone]

## 2022-03-14 DIAGNOSIS — I10 ESSENTIAL HYPERTENSION: ICD-10-CM

## 2022-03-14 RX ORDER — POTASSIUM CHLORIDE 20 MEQ/1
TABLET, EXTENDED RELEASE ORAL
Qty: 90 TABLET | Refills: 1 | Status: SHIPPED
Start: 2022-03-14 | End: 2022-10-24 | Stop reason: SDUPTHER

## 2022-03-14 NOTE — TELEPHONE ENCOUNTER
Last Appointment   11/12/2021  Next Appointment  Visit date not found    Return in about 6 months (around 4/19/2022)

## 2022-04-05 ENCOUNTER — OFFICE VISIT (OUTPATIENT)
Dept: FAMILY MEDICINE CLINIC | Age: 77
End: 2022-04-05
Payer: COMMERCIAL

## 2022-04-05 VITALS
SYSTOLIC BLOOD PRESSURE: 136 MMHG | TEMPERATURE: 97.8 F | HEIGHT: 58 IN | HEART RATE: 64 BPM | WEIGHT: 146.8 LBS | DIASTOLIC BLOOD PRESSURE: 63 MMHG | OXYGEN SATURATION: 98 % | BODY MASS INDEX: 30.82 KG/M2

## 2022-04-05 DIAGNOSIS — I10 ESSENTIAL HYPERTENSION: Primary | ICD-10-CM

## 2022-04-05 DIAGNOSIS — E78.2 MIXED HYPERLIPIDEMIA: ICD-10-CM

## 2022-04-05 PROCEDURE — 99214 OFFICE O/P EST MOD 30 MIN: CPT | Performed by: FAMILY MEDICINE

## 2022-04-05 NOTE — PROGRESS NOTES
[facility]  Medical Student Progress Note    Subjective:    Letty Castorena is a 70-year old female with essential hypertension, hyperlipidemia, and history of DCIS s/p left breast needle lumpectomy and radiation who presents today with follow up for essential hypertension. Per chart review, patient's blood pressures were elevated to 154/70 at her last Heme/Onc appointment in 2/2022, 2 months ago. She takes her blood pressures at home monthly and reports a BP of around 130s/60s. She does not check her BP more often because the pressures appear well-controlled. She has been able to take her medications daily. She denies headaches, dizziness, or lightheadedness. In regards to vision changes, she has cataracts forming per her eye doctor, who she follows up with annually. She states she has trouble focusing on closer objects, and has been following with an eye doctor since her 45s. She does not have an exercise regimen, states that her neighborhood isn't as conducive to walking. She spends long stretches of time sitting as she cross-stitches for hours and gets focused on a project. She lives in a 2-story home and goes up and down steps daily as the kitchen is downstairs. She stated her friend goes to the mall to walk around, and we encouraged her to join her friend in this. 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 Well developed, well nourished, well groomed. No apparent distress. Heart:  RRR, no murmurs, gallops, or rubs. Lungs:  CTA bilaterally, no wheeze, rales or rhonchi  Abd: bowel sounds present, soft, nontender, nondistended, no masses  Extrem:  No clubbing, cyanosis, or edema    Labs:  No results found for this or any previous visit (from the past 24 hour(s)).       Assessment:    Liang Payan Amelia Gonzales is a 70-year old female with essential hypertension, hyperlipidemia, and history of DCIS s/p left breast needle lumpectomy and radiation who presents today with follow up for essential hypertension. She has had a couple of elevated blood pressure readings in the office initially, and her blood pressures at home have been stable at around the 130/60s. She has been taking her medication as prescribed, and both her blood pressures and hyperlipidemia have been managed with her current regimen. Plan:  1. Essential Hypertension  Elevated pressure of 154/70 noted at Heme/Onc appointment on 22, 2 months prior to today's visit. 157/67 in office today, second reading was 136/63. This is elevated from previous Bps in office: 126/72 in 2021, 137/59 in 2021. Patient reports having stable blood pressures in the 130/60s at home. Given stable blood pressure readings both in office and home, we can continue her blood pressure medication as prescribed currently. - Continue Losartan 50 mg daily  - Continue Chlorthalidone 25 mg daily  - Continue to check blood pressures at home  - Encourage incorporating exercise into her routine, patient was provided an indoor exercise handbook     2. Hyperlipidemia  Last lipid panel from 10/2021 within normal limits. Total cholesterol: 133, T, HDL: 72, LDL: 42, VLDL: 19. Total cholesterol has been steadily decreasing from 227 in 2018.   - Continue Atorvastatin 40 mg daily  - Lifestyle changes incorporating exercise as above    3. DCIS s/p left breast needle lumpectomy and radiation in    Patient's last mammogram in 2021 was negative, follows with Heme/Onc.  - Anastrozole 1mg daily  - Calcium carbonate-vitamin D and Potassium Chloride     4. Bilateral cataracts  - Follows up with eye doctor annually    5. Health maintenance   Her last fit test was last summer, was negative. - In discussion with patient she will plan to do a cologuard at the next visit.  She is not interested in getting a colonoscopy. - Open to 4th dose (booster) for [employer] after more information about it comes out    Follow-up in 6 months to check in on blood pressures, hyperlipidemia, and for colon cancer screening with cologuard.        Electronically signed by Roma Olvera, Medical Student ([id number]) on 4/5/2022 at 10:58 AM

## 2022-04-05 NOTE — PROGRESS NOTES
[facility]  Precepting Note    Subjective:  Hypertension  Follow-up  Blood pressure is borderline controlled today. Usually seeing 130s/60s at home. Checks it maybe monthly. BP Readings from Last 3 Encounters:   04/05/22 136/63   02/01/22 (!) 154/70   12/16/21 126/72   chlorthalidone and losartan  Patient continues medications regularly. Compliance is good. Denies CP, sob, abd pain, headaches, vision changes, dizziness, hypotensive symptoms. No side effects from medications noted. Has seen eye doc and she has early cataracts. They are not bothering her. ROS otherwise negative     Past medical, surgical, family and social history were reviewed, non-contributory, and unchanged unless otherwise stated. Objective:    /63   Pulse 64   Temp 97.8 °F (36.6 °C) (Temporal)   Ht 4' 10\" (1.473 m)   Wt 146 lb 12.8 oz (66.6 kg)   LMP  (LMP Unknown)   SpO2 98%   BMI 30.68 kg/m²     Exam is as noted by resident with the following changes, additions or corrections:    General:  NAD; alert & oriented x 3   Heart:  RRR, no murmurs, gallops, or rubs. Lungs:  CTA bilaterally, no wheeze, rales or rhonchi  Abd: bowel sounds present, nontender, nondistended, no masses  Extrem:  No clubbing, cyanosis, or edema    Assessment/Plan:  HTN, acceptable control  Continue same which is controlling the problem. HLD  Labs reviewed. Great 6 mos ago, does not need rechk yet. Continue statin. DCIS  Continues anastrozole. HM  Will discuss cologuard next visit. Introduced today. Attending Physician Statement  I have reviewed the chart, including any radiology or labs. I have discussed the case, including pertinent history and exam findings with the resident. I agree with the assessment, plan and orders as documented by the resident. Please refer to the resident note for additional information.       Electronically signed by David Hartmann MD on 4/5/2022 at 11:39 AM

## 2022-04-05 NOTE — PROGRESS NOTES
[facility]  Department of Family Medicine  Family Medicine Residency Program      Patient:  Dianne Henderson 68 y.o. female  Date of Service: 4/5/22      Chief complaint:   Chief Complaint   Patient presents with    Blood Pressure Check         History ofPresent Illness   Dianne Henderson is a 68 y.o. female who presents to the clinic with complaints as above.     HTN  BP Readings from Last 3 Encounters:   04/05/22 136/63   02/01/22 (!) 154/70   12/16/21 126/72   Has been stable lately  Usually 130s/60s at home  Taking her chlorthalidone and losartan regularly as prescribed  Denies highs or lows, takes it about once per month  Denies headache, dizziness, feeling like she will pass out    HLD  No issues with atorvastatin 40mg  Not due for check yet    Colon Cancer Screening  Had FIT test this past summer, normal  Will do Cologuard this coming summer    Past Medical History:      Diagnosis Date    Allergic reaction 6/22/2021    Cause unclear    [facility]     Cancer ([facility] 2019    breast    Hyperlipidemia     Hypertension     Pneumonia 1953    Shingles        Past Surgical History:        Procedure Laterality Date    BREAST BIOPSY Left 2/27/2019    LEFT BREAST NEEDLE LOCALIZED  LUMPECTOMY  WITH BIOZORB  PLACEMENT --TRIDENT performed by Francesco Monge MD at . [facility] LUMPECTOMY Left 3/26/2019    LEFT BREAST LUMPECTOMY RE-EXCISION ANTERIOR LATERAL SUPERIOR & POSTERIOR MARGINS , REPLACEMENT OF BIOZORB performed by Francesco Monge MD at [facility]       Allergies:    Poultry meal, Ampicillin, Valerian, Valine, Hydrocodone-acetaminophen, Molds & smuts, and Pcn [penicillins]    Social History:   Social History     Socioeconomic History    Marital status: Single     Spouse name: Not on file    Number of children: Not on file    Years of education: Not on file   [facility] education level: Not on file   Occupational History    Not on file   Tobacco Use    Smoking status: Never Smoker    Smokeless tobacco: Never Used   Vaping Use    Vaping Use: Never used   Substance and Sexual Activity    Alcohol use: Yes     Alcohol/week: 1.0 standard drink     Types: 1 Glasses of wine per week     Comment: occasionally    Drug use: No    Sexual activity: Not Currently   Other Topics Concern    Not on file   Social History Narrative    Thiago Santiago lives in [place] alone - retired from [employer]. Social Determinants of Health     Financial Resource Strain: Low Risk     Difficulty of Paying Living Expenses: Not hard at all   Food Insecurity: No Food Insecurity    Worried About Running Out of Food in the Last Year: Never true    Sakshi of Food in the Last Year: Never true   Transportation Needs:     Lack of Transportation (Medical): Not on file    Lack of Transportation (Non-Medical):  Not on file   Physical Activity:     Days of Exercise per Week: Not on file    Minutes of Exercise per Session: Not on file   Stress:     Feeling of Stress : Not on file   Social Connections:     Frequency of Communication with Friends and Family: Not on file    Frequency of Social Gatherings with Friends and Family: Not on file    Attends Worship Services: Not on file    Active Member of [address] [place] [address] [employer] or Organizations: Not on file    Attends Club or Organization Meetings: Not on file    Marital Status: Not on file   Intimate Partner Violence:     Fear of Current or Ex-Partner: Not on file    Emotionally Abused: Not on file    Physically Abused: Not on file    Sexually Abused: Not on file   Housing Stability:     Unable to Pay for Housing in the Last Year: Not on file    Number of [place] in the Last Year: Not on file    Unstable Housing in the Last Year: Not on file        Family History:       Problem Relation Age of Onset    Parkinsonism Mother     Heart Attack Father     Cancer Father 80 leukemia    Cancer Maternal Aunt         Breast--60 or 76s    Cancer Maternal Grandfather         unknown    Cancer Paternal Cousin [address]        leukemia       Medication List:    Current Outpatient Medications   Medication Sig Dispense Refill    potassium chloride (KLOR-CON M) 20 MEQ extended release tablet Take one tablet by mouth daily at the same time as your chlorthalidone (or [place]) pill. 90 tablet 1    anastrozole (ARIMIDEX) 1 MG tablet TAKE 1 TABLET BY MOUTH DAILY 90 tablet 2    atorvastatin (LIPITOR) 40 MG tablet TAKE 1 TABLET BY MOUTH DAILY 90 tablet 1    chlorthalidone (HYGROTON) 25 MG tablet TAKE 1 TABLET BY MOUTH DAILY 90 tablet 1    losartan (COZAAR) 50 MG tablet TAKE 1 TABLET BY MOUTH DAILY 90 tablet 1    calcium carbonate-vitamin D (CALCIUM 600 + D) 600-400 MG-UNIT TABS per tab Take 1 tablet by mouth 2 times daily 180 tablet 1    ibuprofen (ADVIL MIGRAINE) 200 MG CAPS Take 1 capsule by mouth as needed for Pain      Multiple Vitamins-Minerals (EYE VITAMINS) CAPS Take by mouth 2 times daily       No current facility-administered medications for this visit. Review of Systems:   Review of Systems as per HPI    Physical Exam   Vitals: /63   Pulse 64   Temp 97.8 °F (36.6 °C) (Temporal)   Ht 4' 10\" (1.473 m)   Wt 146 lb 12.8 oz (66.6 kg)   LMP  (LMP Unknown)   SpO2 98%   BMI 30.68 kg/m²   Physical Exam  Vitals and nursing note reviewed. Constitutional:       General: She is not in acute distress. Appearance: Normal appearance. HENT:      Head: Normocephalic and atraumatic. Eyes:      General:         Right eye: No discharge. Left eye: No discharge. Cardiovascular:      Rate and Rhythm: Normal rate and regular rhythm. Heart sounds: No murmur heard. Pulmonary:      Effort: Pulmonary effort is normal.      Breath sounds: Normal breath sounds. No wheezing. Abdominal:      General: Bowel sounds are normal.      Palpations: Abdomen is soft. Musculoskeletal:         General: Normal range of motion. Cervical back: Normal range of motion. No rigidity. Right lower leg: No edema. Left lower leg: No edema. Skin:     General: Skin is warm and dry. Neurological:      Mental Status: She is alert and oriented to person, place, and time. Mental status is at baseline. Assessment and Plan     1. Essential hypertension  BP Readings from Last 3 Encounters:   04/05/22 136/63   02/01/22 (!) 154/70   12/16/21 126/72   Blood Pressure appropriate for age and comorbidities  Continue chlorthalidone 25mg daily, losartan 100mg   FU 6 months    2. Mixed hyperlipidemia  Not due for cholesterol panel  Stable  Continue atorvastatin 40mg daily      Counseled regarding above diagnosis, including possible risks and complications, especially if left uncontrolled. Counseled regarding the possible side effects, risks, benefits and alternatives to treatment; patient and/or guardian verbalizes understanding, agrees, feels comfortable with, and wishes to proceed with above treatment plan. Call or go to ED immediately if symptoms worsen or persist. Advised patient to call with any new medication issues and, as applicable, read all Rx info from pharmacy to assure aware of all possible risks and side effects of medication before taking. Patient and/or guardian given opportunity to ask questions/raise concerns. The patient verbalized comfort and understanding of instructions. I encourage further reading and education about your health conditions. Information on many health conditions is provided by the American Academy of Family Physicians: https://familydoctor. org/  Please bring any questions to me at your next visit. Return to Office: Return in about 6 months (around 10/5/2022) for FU HTN.     Sharri Monique DO

## 2022-05-12 DIAGNOSIS — D64.9 ANEMIA, UNSPECIFIED TYPE: Primary | ICD-10-CM

## 2022-05-12 DIAGNOSIS — D05.12 DUCTAL CARCINOMA IN SITU (DCIS) OF LEFT BREAST: ICD-10-CM

## 2022-05-13 ENCOUNTER — HOSPITAL ENCOUNTER (OUTPATIENT)
Dept: INFUSION THERAPY | Age: 77
Discharge: HOME | End: 2022-05-13
Payer: COMMERCIAL

## 2022-05-13 ENCOUNTER — OFFICE VISIT (OUTPATIENT)
Dept: ONCOLOGY | Age: 77
End: 2022-05-13
Payer: COMMERCIAL

## 2022-05-13 VITALS
WEIGHT: 148.7 LBS | HEIGHT: 58 IN | OXYGEN SATURATION: 98 % | SYSTOLIC BLOOD PRESSURE: 150 MMHG | BODY MASS INDEX: 31.21 KG/M2 | DIASTOLIC BLOOD PRESSURE: 67 MMHG | TEMPERATURE: 97.4 F | HEART RATE: 71 BPM

## 2022-05-13 DIAGNOSIS — D05.12 DUCTAL CARCINOMA IN SITU (DCIS) OF LEFT BREAST: Primary | ICD-10-CM

## 2022-05-13 DIAGNOSIS — D05.12 DUCTAL CARCINOMA IN SITU (DCIS) OF LEFT BREAST: ICD-10-CM

## 2022-05-13 DIAGNOSIS — D64.9 ANEMIA, UNSPECIFIED TYPE: ICD-10-CM

## 2022-05-13 LAB
ALBUMIN SERPL-MCNC: 4.1 G/DL (ref 3.5–5.2)
ALP BLD-CCNC: 98 U/L (ref 35–104)
ALT SERPL-CCNC: 12 U/L (ref 0–32)
ANION GAP SERPL CALCULATED.3IONS-SCNC: 9 MMOL/L (ref 7–16)
AST SERPL-CCNC: 21 U/L (ref 0–31)
BASOPHILS ABSOLUTE: 0.07 E9/L (ref 0–0.2)
BASOPHILS RELATIVE PERCENT: 1.5 % (ref 0–2)
BILIRUB SERPL-MCNC: 0.5 MG/DL (ref 0–1.2)
BUN BLDV-MCNC: 22 MG/DL (ref 6–23)
CALCIUM SERPL-MCNC: 9.3 MG/DL (ref 8.6–10.2)
CHLORIDE BLD-SCNC: 101 MMOL/L (ref 98–107)
CO2: 26 MMOL/L (ref 22–29)
CREAT SERPL-MCNC: 0.8 MG/DL (ref 0.5–1)
EOSINOPHILS ABSOLUTE: 0.1 E9/L (ref 0.05–0.5)
EOSINOPHILS RELATIVE PERCENT: 2.1 % (ref 0–6)
GFR AFRICAN AMERICAN: >60
GFR NON-AFRICAN AMERICAN: >60 ML/MIN/1.73
GLUCOSE BLD-MCNC: 97 MG/DL (ref 74–99)
HCT VFR BLD CALC: 34.6 % (ref 34–48)
HEMOGLOBIN: 11.3 G/DL (ref 11.5–15.5)
IMMATURE GRANULOCYTES #: 0.01 E9/L
IMMATURE GRANULOCYTES %: 0.2 % (ref 0–5)
LYMPHOCYTES ABSOLUTE: 1.39 E9/L (ref 1.5–4)
LYMPHOCYTES RELATIVE PERCENT: 29.3 % (ref 20–42)
MCH RBC QN AUTO: 27.9 PG (ref 26–35)
MCHC RBC AUTO-ENTMCNC: 32.7 % (ref 32–34.5)
MCV RBC AUTO: 85.4 FL (ref 80–99.9)
MONOCYTES ABSOLUTE: 0.69 E9/L (ref 0.1–0.95)
MONOCYTES RELATIVE PERCENT: 14.6 % (ref 2–12)
NEUTROPHILS ABSOLUTE: 2.48 E9/L (ref 1.8–7.3)
NEUTROPHILS RELATIVE PERCENT: 52.3 % (ref 43–80)
PDW BLD-RTO: 14.3 FL (ref 11.5–15)
PLATELET # BLD: 182 E9/L (ref 130–450)
PMV BLD AUTO: 11.1 FL (ref 7–12)
POTASSIUM SERPL-SCNC: 4.1 MMOL/L (ref 3.5–5)
RBC # BLD: 4.05 E12/L (ref 3.5–5.5)
SODIUM BLD-SCNC: 136 MMOL/L (ref 132–146)
TOTAL PROTEIN: 7.4 G/DL (ref 6.4–8.3)
WBC # BLD: 4.7 E9/L (ref 4.5–11.5)

## 2022-05-13 PROCEDURE — 36415 COLL VENOUS BLD VENIPUNCTURE: CPT

## 2022-05-13 PROCEDURE — 85025 COMPLETE CBC W/AUTO DIFF WBC: CPT

## 2022-05-13 PROCEDURE — 99212 OFFICE O/P EST SF 10 MIN: CPT

## 2022-05-13 PROCEDURE — 80053 COMPREHEN METABOLIC PANEL: CPT

## 2022-05-13 PROCEDURE — 99214 OFFICE O/P EST MOD 30 MIN: CPT | Performed by: INTERNAL MEDICINE

## 2022-05-13 NOTE — PROGRESS NOTES
[facility] MED ONCOLOGY  [address] [facility] [id number]  Dept: Rohit Whalen: [phone]  Attending Progress note      Reason for Visit:   Left Breast DCIS. Referring Physician:  Medina Elizondo MD.    PCP:  Laura Aldrich DO    History of Present Illness: The patient is a pleasant 68 y.o. lady, with a past medical history significant for hyperlipidemia, and hypertension, who had presented with an abnormal screening Mammogram:  MAMMOGRAM (12/11/2018): INDICATION:   Screening.       HISTORY:   The patient has no personal history of cancer. The patient has the following family history of breast cancer:  maternal aunt.       MAMMOGRAM VIEWS:   The following mammographic views where obtained: bilateral craniocaudal; bilateral craniocaudal with tomosynthesis; bilateral mediolateral oblique; and bilateral mediolateral oblique with tomosynthesis       TOMOSYNTHESIS:   Tomosynthesis (3 Dimensional Breast Imaging) was used on this examination to aid in evaluation.       COMPARISON:   No prior imaging studies are available for comparison.       CAD:   This exam was reviewed using the eventblimp Computer Aided Detection (CAD)       TISSUE DENSITY:   The breasts are heterogeneously dense (Type 3 density).       MAMMOGRAM FINDINGS:   In the right breast, no suspicious masses, areas of suspicious architectural distortion, suspicious calcifications, or additional suspicious findings are identified.           Finding 1:   There are coarse heterogeneous and fine pleomorphic calcifications measuring 17 mm seen in the upper outer quadrant of the left breast.       IMPRESSION:   Calcifications in the left breast require additional evaluation.    Spot magnification views with consideration for a stereotactic biopsy if needed.       =======================================   BI-RADS Category 0:  Incomplete: Need Additional Imaging Evaluation =======================================       RISK ASSESSMENT:   During this patient's visit, information obtained was used to generate a lifetime risk assessment using the Tyrer-Cuzick model (also called the BRENNON [International Breast Cancer Intervention Study] Breast Cancer Risk Evaluation Tool).     - LIFETIME RISK -   Patient has a Tyrer Cuzick score of 7.9%   - BREAST TISSUE DENSITY -   Heterogeneously dense       -AVERAGE RISK (<15% )   Yearly screening mammograms starting at age 36 and older. Regardless of breast density, tomosynthesis is suggested. Monthly self-breast exams are recommended for women age 27 and older.       NOTE:   Mammography is not 100% accurate in the detection of breast cancer.  Accuracy decreases as mammographic density of the breast increases.  A negative mammogram should not deter further evaluation (including biopsy) of suspicious physical findings.     Recommendations are based on NCCN ( [address]) and ACR [employer] of Radiology) guidelines.       Thank you for sending your patient to this ACR and FDA approved facility. If there are any physician concerns regarding this report, a Radiologist can be reached by calling the following number [phone].       Follow up Protocol for the [place]:   BI-RADS 1 or 2: Hemalatha Lezama will send a reminder when next mammogram is due. BI-RADS 3: Hemalatha Lezama will send a reminder for recommended short term follow up. BI-RADS 0, 4 or 5: Hemalatha Lezama will contact patient to schedule all additional views and procedures.      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  Detached fragments of carcinoma  without associated stroma are also present.  Microcalcifications are  noted.  Intradepartmental consultation is obtained. Breast Cancer Marker Studies:  Estrogen Receptors (ER):  Positive:         Percentage of cells positive:  >90%         Intensity:  Strong    Progesterone Receptors (VT):  Positive:         Percentage of cells positive:  90%         Intensity:  Moderate to strong     The patient underwent on 1/18/2019 a Left breast needle localized lumpectomy, pathology:  CANCER CASE SUMMARY  Procedure-excision  Specimen laterality-left  Size (extent) of DCIS: 1.3 x 1.2 x 1.2 cm  Histologic type-ductal carcinoma in situ  Nuclear grade-grade 3 (high)  Necrosis-present, central  Margins (part A)-anterior and lateral margins involved by DCIS; DCIS is 1  mm from posterior margin  Margins (part B)- uninvolved by DCIS   Distance from closest margin: 1 mm from superior margin  Regional lymph nodes-no lymph nodes submitted or found  TNM classification (AJCC eighth edition)-pTis  NX MX  Ancillary studies-see prior report [facility]  for ER/ VT results    She underwent on 3/26/2019 a left lumpectomy specimen, new posterior margin, additional superior margin, path was neg for residual DCIS. The patient completed adjuvant radiation therapy on 6/6/2019. She was started on Arimidex on 6/18/2019. The patient denies any side effects from the Arimidex, she is feeling well in general, no changes in the back stiffness, no new bony pain. No new breast changes. She denies any bleeding, no melena or hematochezia. She will be due for repeat Cologuard in the summer. Review of Systems;  CONSTITUTIONAL: No fever, chills. Good energy level. Weight is stable since last visit. ENMT: Eyes: No diplopia; Nose: No epistaxis. Mouth: No sore throat. RESPIRATORY: No hemoptysis, shortness of breath, cough. CARDIOVASCULAR: No chest pain, palpitations. GASTROINTESTINAL: No nausea/vomiting, abdominal pain, diarrhea/constipation.   GENITOURINARY: No dysuria, urinary frequency, hematuria. NEURO: No syncope, presyncope, headache. MSK: pos for chronic joints pain. 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 [facility] LUMPECTOMY Left 3/26/2019    LEFT BREAST LUMPECTOMY RE-EXCISION ANTERIOR LATERAL SUPERIOR & POSTERIOR MARGINS , REPLACEMENT OF BIOZORB performed by Medina Elizondo MD at [place] [address]       Family History:  Family History   Problem Relation Age of Onset   [place] Parkinsonism Mother     Heart Attack Father     Cancer Father 80        leukemia    Cancer Maternal Aunt         Breast--60 or 76s    Cancer Maternal Grandfather         unknown    Cancer Paternal Cousin 16        leukemia       Medications:  Reviewed and reconciled. Social History:  Social History     Socioeconomic History    Marital status: Single     Spouse name: Not on file    Number of children: Not on file    Years of education: Not on file    Highest education level: Not on file   Occupational History    Not on file   Tobacco Use    Smoking status: Never Smoker    Smokeless tobacco: Never Used   Vaping Use    Vaping Use: Never used   Substance and Sexual Activity    Alcohol use:  Yes     Alcohol/week: 1.0 standard drink     Types: 1 Glasses of wine per week     Comment: occasionally    Drug use: No    Sexual activity: Not Currently   Other Topics Concern    Not on file   Social History Narrative    Marylee Robertson lives in [place] alone - retired from [employer]). Social Determinants of Health     Financial Resource Strain: Low Risk     Difficulty of Paying Living Expenses: Not hard at all   Food Insecurity: No Food Insecurity    Worried About Running Out of Food in the Last Year: Never true    Sakshi of Food in the Last Year: Never true   Transportation Needs:     Lack of Transportation (Medical): Not on file    Lack of Transportation (Non-Medical): Not on file   Physical Activity:     Days of Exercise per Week: Not on file    Minutes of Exercise per Session: Not on file   Stress:     Feeling of Stress : Not on file   Social Connections:     Frequency of Communication with Friends and Family: Not on file    Frequency of Social Gatherings with Friends and Family: Not on file    Attends [employer] Services: Not on file    Active Member of [address] [place] [address] or Organizations: Not on file    Attends Club or Organization Meetings: Not on file    Marital Status: Not on file   Intimate Partner Violence:     Fear of Current or Ex-Partner: Not on file    Emotionally Abused: Not on file    Physically Abused: Not on file    Sexually Abused: Not on file   Housing Stability:     Unable to Pay for Housing in the Last Year: Not on file    Number of [place] in the Last Year: Not on file    Unstable Housing in the Last Year: Not on file       Allergies: Allergies   Allergen Reactions    Poultry Meal Hives    Ampicillin     Valerian     Valine     Hydrocodone-Acetaminophen Rash    Molds & Smuts Other (See Comments)    Pcn [Penicillins] Rash     OB/GYN:  Age of menarche was 8. Age of menopause was 61. Last menstrual period was age 61. Patient denies hormonal therapy.  Patient is     Physical Exam:  BP (!) 150/67   Pulse 71   Temp 97.4 °F (36.3 °C)   Ht 4' 10\" (1.473 m)   Wt 148 lb 11.2 oz (67.4 kg)   LMP  (LMP Unknown)   SpO2 98%   BMI 31.08 kg/m²     GENERAL: Alert, oriented x 3, not in acute distress. HEENT: PERRLA; EOMI. Oropharynx clear. NECK: Supple. No palpable cervical or supraclavicular lymphadenopathy. LUNGS: Good air entry bilaterally. No wheezing, crackles or rhonchi. BREASTS: Right breast exam is negative for any skin changes, no nipple discharge, no palpable masses, no palpable axillary lymphadenopathy. The left breast exam is negative for any nipple discharge, she has a scar from her lumpectomy, no palpable masses, no palpable left axillary lymphadenopathy. CARDIOVASCULAR: Regular rate. No murmurs, rubs or gallops. ABDOMEN: Soft. Non-tender, non-distended. Positive bowel sounds. EXTREMITIES: Without clubbing, cyanosis, or edema. NEUROLOGIC: No focal deficits. ECOG PS 1    Impression/Plan:      The patient is a pleasant 68 y.o. lady, with a past medical history significant for hyperlipidemia, and hypertension, who had presented with an abnormal screening Mammogram, she was diagnosed with a left breast DCIS, she is s/p Left breast needle localized lumpectomy, the DCIS is high grade with central necrosis, final margins negative, final pathologic stage is pTis NX MX, satge 0 disease, ER positive > 90% DE positive, 90%. I discussed with the patient her diagnosis, characteristics of her DCIS, risk of disease recurrence, the increased risk of having an invasive breast cancer in the ipsilateral and contralateral breast. The patient has an ER positive disease I recommended endocrine therapy with an aromatase inhibitor, we discussed the risks and benefits of Arimidex.  I had discussed with the patient the side effects of Arimidex, including but not limited to mood changes, hot flashes, joints pain, osteoporosis. She completed adjuvant radiation therapy on 6/6/2019, . She was started on Arimidex on 6/18/2019, she is tolerating it well overall, continue Arimidex. The patient had a DEXA scan done on 6/15/2021, revealing normal bone density, compared to June 2020, bone density demonstrates interval increase in the lumbar spine but decreased in both hips. Continue calcium and vitamin D. Will have a repeat DEXA scan done in June 2022, was ordered.       I discussed with her the importance of healthy lifestyle, monthly breast self-examination, as well as routine screening mammograms, she had bilateral screening mammogram done on 12/16/2021, , was negative for malignancy, will be due again on 12/17/2022. I reviewed with the patient surveillance guidelines. Continue with surveillance. Mild anemia, normocytic,  fit test was negative, a work-up was ordered, she does not have iron vitamin B12, folate deficiency. Radha test is negative, sh is normal, reticulocytes 1.2%, the results were reviewed with the patient, hemoglobin/hematocrit had improved. Repeat CBC today is remarkable for a hemoglobin of 11.3, hct 34.6, normal white count and platelet count, we will continue to monitor her CBCD. RTC in 3 months. Thank you for allowing us to participate in the care of Mrs. Staley.     [address], MD   HEMATOLOGY/MEDICAL [facility]  [address] MED ONCOLOGY  [address] [place] [id number]  Dept: 71 Penelope Whalen: [phone]

## 2022-05-24 ASSESSMENT — ENCOUNTER SYMPTOMS
SORE THROAT: 0
VOICE CHANGE: 0
SINUS PRESSURE: 0
CHEST TIGHTNESS: 0
SINUS PAIN: 0
DIARRHEA: 0
SHORTNESS OF BREATH: 0
EYE ITCHING: 0
RHINORRHEA: 0
CONSTIPATION: 0
COUGH: 0
CHOKING: 0
ABDOMINAL DISTENTION: 0
WHEEZING: 0
TROUBLE SWALLOWING: 0
ABDOMINAL PAIN: 0
BLOOD IN STOOL: 0
EYE DISCHARGE: 0
VOMITING: 0
NAUSEA: 0

## 2022-05-24 NOTE — PROGRESS NOTES
Subjective: This note was copied forward from the last encounter. Essential components for this patient record were reviewed and verified on this visit including:  recent hospitalizations, recent imaging, PMH, PSH, FH, SOC HX, Allergies, and Medications were reviewed and updated as appropriate. In addition, the assessment and plan were copied from prior office note and updated accordingly. Patient ID: Radha Ross is a 68 y.o. female. SHOBHA Alfaro is a pleasant 68 y.o. lady, with a past medical history significant for hyperlipidemia, and hypertension, who had presented with an abnormal screening Mammogram:      12/11/2018 bilateral screening mammogram noted calcifications in the left upper outer quadrant measuring 17 mm.  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  Detached fragments of carcinoma without associated stroma are also present.  Microcalcifications are noted.  Intradepartmental consultation is obtained.     Breast Cancer Marker Studies:  Estrogen Receptors (ER):  Positive:         Percentage of cells positive:  >90%         Intensity:  Strong    Progesterone Receptors (IN):  Positive:         Percentage of cells positive:  90%         Intensity:  Moderate to strong     01/18/2019 Underwent a Left breast needle localized lumpectomy, pathology:  CANCER CASE SUMMARY  Procedure-excision  Specimen laterality-left  Size (extent) of DCIS: 1.3 x 1.2 x 1.2 cm  Histologic type-ductal carcinoma in situ  Nuclear grade-grade 3 (high)  Necrosis-present, central  Margins (part A)-anterior and lateral margins involved by DCIS; DCIS is 1  mm from posterior margin  Margins (part B)- uninvolved by DCIS   Distance from closest margin: 1 mm from superior margin  Regional lymph nodes-no lymph nodes submitted or found  TNM classification (AJCC eighth edition)-pTis  NX MX  Ancillary studies-see prior report [facility]  for ER/ [place] results     03/26/2019 a left lumpectomy specimen, new posterior margin, additional superior margin, path was neg for residual DCIS.  06/06/2019 completed adjuvant radiation therapy   -06/18/2019 endocrine therapy with Arimidex 1 mg by mouth daily was started per Dr. Waletr Dejesus. Review of Systems   Constitutional: Negative for activity change, appetite change, chills, fatigue, fever and unexpected weight change. She continues to do well. Persistent scattered arthralgias,most notable of the B/L hips; Remains stable and not interfering with her quality of life. HENT: Positive for postnasal drip (r/t allergy season. ). Negative for congestion, rhinorrhea, sinus pressure, sinus pain, sore throat, trouble swallowing and voice change. Eyes: Negative for discharge, itching and visual disturbance. Respiratory: Negative for cough, choking, chest tightness, shortness of breath and wheezing. Cardiovascular: Negative for chest pain, palpitations and leg swelling. Gastrointestinal: Negative for abdominal distention, abdominal pain, blood in stool, constipation, diarrhea, nausea and vomiting. Endocrine: Negative for cold intolerance and heat intolerance. Genitourinary: Negative for difficulty urinating, dysuria, frequency and hematuria. Musculoskeletal: Positive for arthralgias and back pain. Negative for gait problem, joint swelling, myalgias, neck pain and neck stiffness. Right shoulder pain r/t repetitive motion with crocheting. Allergic/Immunologic: Negative for environmental allergies and food allergies. Neurological: Negative for dizziness, seizures, syncope, speech difficulty, weakness, light-headedness and headaches. Hematological: Negative for adenopathy. Does not bruise/bleed easily. Psychiatric/Behavioral: Negative for agitation, confusion and decreased concentration. The patient is not nervous/anxious. Objective:   Physical Exam  Vitals and nursing note reviewed. Constitutional:       General: She is not in acute distress. Appearance: Normal appearance. She is well-developed. She is not diaphoretic. Comments: ECOG once again remains stable at 0; pleasant and cooperative. HENT:      Head: Normocephalic and atraumatic. Mouth/Throat:      Pharynx: No oropharyngeal exudate. Eyes:      General: No scleral icterus. Right eye: No discharge. Left eye: No discharge. Conjunctiva/sclera: Conjunctivae normal.   Neck:      Thyroid: No thyromegaly. Vascular: No JVD. Trachea: No tracheal deviation. Cardiovascular:      Rate and Rhythm: Normal rate and regular rhythm. Heart sounds: No murmur heard. No friction rub. No gallop. Pulmonary:      Effort: Pulmonary effort is normal. No respiratory distress or retractions. Breath sounds: Normal breath sounds. No stridor. No wheezing or rales. Chest:      Chest wall: No mass, lacerations, deformity, swelling, tenderness or edema. Breasts:      Breasts are asymmetrical (Left breast smaller than right). Right: No inverted nipple, mass, nipple discharge, skin change or tenderness. Left: No inverted nipple, mass, nipple discharge, skin change or tenderness. Comments: Right breast exam is stable and unremarkable. Abdominal:      General: There is no distension. Palpations: Abdomen is soft. Tenderness: There is no abdominal tenderness. There is no guarding or rebound. Musculoskeletal:         General: No tenderness or deformity. Right shoulder: Normal.      Left shoulder: Decreased range of motion. Cervical back: Normal range of motion and neck supple. Lymphadenopathy:      Cervical: No cervical adenopathy.       Right cervical: No superficial, deep or posterior cervical adenopathy. Left cervical: No superficial, deep or posterior cervical adenopathy. Upper Body:      Right upper body: No pectoral adenopathy. Left upper body: No pectoral adenopathy. Skin:     General: Skin is warm and dry. Coloration: Skin is not pale. Findings: No erythema or rash. Neurological:      Mental Status: She is alert and oriented to person, place, and time. Coordination: Coordination normal.   Psychiatric:         Behavior: Behavior normal.         Thought Content: Thought content normal.         Judgment: Judgment normal.       Assessment:     68 y.o. Extremely pleasant female with DCIS left breast s/p lumpectomy. ER/[place] positive disease. 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  Detached fragments of carcinoma without associated stroma are also present.  Microcalcifications are noted.  Intradepartmental consultation is obtained. Breast Cancer Marker Studies:  Estrogen Receptors (ER): Positive: >90%  Intensity:  Strong    Progesterone Receptors (AZ) Positive         Percentage of cells positive:  90%         Intensity:  Moderate to strong     The patient underwent on 1/18/2019 a Left breast needle localized lumpectomy, pathology:    CANCER CASE SUMMARY left breast DCIS. Size 1.3 x 1.2 x 1.2 cm. High-grade with central necrosis. Margins (part A)-anterior and lateral margins involved by DCIS; DCIS is 1 mm from posterior margin  Margins (part B)- uninvolved by DCIS    Distance from closest margin: 1 mm from superior margin. No lymph nodes submitted.   TNM classification (AJCC eighth edition)-pTis  NX MX  Ancillary studies-see prior report [facility] [id number] for ER/ [place] results     03/26/2019 a left lumpectomy specimen, new posterior margin, additional superior margin, path was neg for residual DCIS.  06/06/2019 completed adjuvant radiation therapy. 06/18/2019 started ET with Arimidex 1 mg by mouth daily. DEXA bone density 05/09/2020: Normal.  On Ca+/Vit D daily. 12/14/2020 B/L screening mammogram:  Benign, BI-RADS 2.    06/15/2021 clinical follow-up is without evidence of recurrent disease. Tolerating AI well with the exception of scattered arthralgias, most notable of the bilateral hips. 12/16/2021 bilateral screening mammogram Negative, BI-RADS 1. Clinical follow-up is without evidence of recurrent disease. She is tolerating AI well. Imaging reviewed, including BI-RADS result. We discussed decreased ROM of the LUE/left shoulder joint. She declines a referral to OT at this time. Exercises for improved ROM discussed. Continue BSE monthly, exercise, and healthy eating. 06/08/2022 clinical follow-up is without evidence of recurrent disease. She is tolerating Arimidex failure with slightly progressive arthralgias which she suggests can be attributed to her increased activity in her flowers and gardening. We discussed trial of Cymbalta to improve tolerance; she will wait and discuss with medical oncology on her next visit. Plan:   1. Continue monthly breast/chest wall self examination; detailed instructions reviewed today. Bring any changes to your physician's attention. 2. Continue healthy diet and exercise routinely as tolerated. 3. Maintaining ideal body weight (20-25 BMI) may lead to optimal breast cancer outcomes. 4. Avoid alcohol. 5. Repeat mammogram December 2022 (ordered). 6. Continue Arimidex 1 mg daily at the discretion of medical oncology team.  7. Ca+/VitD while on Arimidex. 8. Continue follow up with Medical Oncology, Primary Care, and all specialties as directed  9. RTC 6 months with mammogram same day.      During today's visit, face-to-face time 25  minutes, greater than 50% in counseling education and coordination of care. All questions were answered to her apparent satisfaction, and she is agreeable to the plan as outlined above. Everett Painter, RN, MSN, APRN-CNP, [address]  Advanced Oncology Certified Nurse Practitioner  Department of Breast Surgery  [facility]/  Care in collaboration with Dr. Lamin Figueroa.  Ashley/Stacy Moreno APRN-CNP

## 2022-05-27 DIAGNOSIS — E78.2 MIXED HYPERLIPIDEMIA: ICD-10-CM

## 2022-05-27 RX ORDER — ATORVASTATIN CALCIUM 40 MG/1
40 TABLET, FILM COATED ORAL DAILY
Qty: 90 TABLET | Refills: 1 | Status: SHIPPED | OUTPATIENT
Start: 2022-05-27

## 2022-06-08 ENCOUNTER — OFFICE VISIT (OUTPATIENT)
Dept: BREAST CENTER | Age: 77
End: 2022-06-08
Payer: COMMERCIAL

## 2022-06-08 VITALS
OXYGEN SATURATION: 100 % | SYSTOLIC BLOOD PRESSURE: 124 MMHG | DIASTOLIC BLOOD PRESSURE: 60 MMHG | TEMPERATURE: 97.4 F | WEIGHT: 150.3 LBS | BODY MASS INDEX: 31.55 KG/M2 | HEIGHT: 58 IN | HEART RATE: 66 BPM | RESPIRATION RATE: 22 BRPM

## 2022-06-08 DIAGNOSIS — Z12.31 VISIT FOR SCREENING MAMMOGRAM: Primary | ICD-10-CM

## 2022-06-08 DIAGNOSIS — D05.12 DUCTAL CARCINOMA IN SITU (DCIS) OF LEFT BREAST: ICD-10-CM

## 2022-06-08 PROCEDURE — 99213 OFFICE O/P EST LOW 20 MIN: CPT | Performed by: NURSE PRACTITIONER

## 2022-06-08 PROCEDURE — 1123F ACP DISCUSS/DSCN MKR DOCD: CPT | Performed by: NURSE PRACTITIONER

## 2022-06-08 PROCEDURE — 99213 OFFICE O/P EST LOW 20 MIN: CPT

## 2022-06-08 ASSESSMENT — ENCOUNTER SYMPTOMS: BACK PAIN: 1

## 2022-08-16 ENCOUNTER — OFFICE VISIT (OUTPATIENT)
Dept: ONCOLOGY | Age: 77
End: 2022-08-16
Payer: COMMERCIAL

## 2022-08-16 ENCOUNTER — HOSPITAL ENCOUNTER (OUTPATIENT)
Dept: INFUSION THERAPY | Age: 77
Discharge: HOME | End: 2022-08-16
Payer: COMMERCIAL

## 2022-08-16 VITALS
HEART RATE: 77 BPM | SYSTOLIC BLOOD PRESSURE: 140 MMHG | TEMPERATURE: 98.1 F | OXYGEN SATURATION: 100 % | HEIGHT: 58 IN | BODY MASS INDEX: 32.07 KG/M2 | WEIGHT: 152.8 LBS | DIASTOLIC BLOOD PRESSURE: 68 MMHG

## 2022-08-16 DIAGNOSIS — D05.12 DUCTAL CARCINOMA IN SITU (DCIS) OF LEFT BREAST: Primary | ICD-10-CM

## 2022-08-16 DIAGNOSIS — D64.9 ANEMIA, UNSPECIFIED TYPE: ICD-10-CM

## 2022-08-16 DIAGNOSIS — D05.12 DUCTAL CARCINOMA IN SITU (DCIS) OF LEFT BREAST: ICD-10-CM

## 2022-08-16 LAB
ALBUMIN SERPL-MCNC: 4.1 G/DL (ref 3.5–5.2)
ALP BLD-CCNC: 93 U/L (ref 35–104)
ALT SERPL-CCNC: 11 U/L (ref 0–32)
ANION GAP SERPL CALCULATED.3IONS-SCNC: 11 MMOL/L (ref 7–16)
AST SERPL-CCNC: 20 U/L (ref 0–31)
BASOPHILS ABSOLUTE: 0.07 E9/L (ref 0–0.2)
BASOPHILS RELATIVE PERCENT: 1.4 % (ref 0–2)
BILIRUB SERPL-MCNC: 0.5 MG/DL (ref 0–1.2)
BUN BLDV-MCNC: 18 MG/DL (ref 6–23)
CALCIUM SERPL-MCNC: 10.1 MG/DL (ref 8.6–10.2)
CHLORIDE BLD-SCNC: 104 MMOL/L (ref 98–107)
CO2: 24 MMOL/L (ref 22–29)
CREAT SERPL-MCNC: 0.9 MG/DL (ref 0.5–1)
EOSINOPHILS ABSOLUTE: 0.1 E9/L (ref 0.05–0.5)
EOSINOPHILS RELATIVE PERCENT: 2 % (ref 0–6)
GFR AFRICAN AMERICAN: >60
GFR NON-AFRICAN AMERICAN: >60 ML/MIN/1.73
GLUCOSE BLD-MCNC: 95 MG/DL (ref 74–99)
HCT VFR BLD CALC: 36.6 % (ref 34–48)
HEMOGLOBIN: 12 G/DL (ref 11.5–15.5)
IMMATURE GRANULOCYTES #: 0.01 E9/L
IMMATURE GRANULOCYTES %: 0.2 % (ref 0–5)
LYMPHOCYTES ABSOLUTE: 1.28 E9/L (ref 1.5–4)
LYMPHOCYTES RELATIVE PERCENT: 26.1 % (ref 20–42)
MCH RBC QN AUTO: 28.1 PG (ref 26–35)
MCHC RBC AUTO-ENTMCNC: 32.8 % (ref 32–34.5)
MCV RBC AUTO: 85.7 FL (ref 80–99.9)
MONOCYTES ABSOLUTE: 0.68 E9/L (ref 0.1–0.95)
MONOCYTES RELATIVE PERCENT: 13.8 % (ref 2–12)
NEUTROPHILS ABSOLUTE: 2.77 E9/L (ref 1.8–7.3)
NEUTROPHILS RELATIVE PERCENT: 56.5 % (ref 43–80)
PDW BLD-RTO: 14.4 FL (ref 11.5–15)
PLATELET # BLD: 187 E9/L (ref 130–450)
PMV BLD AUTO: 11.6 FL (ref 7–12)
POTASSIUM SERPL-SCNC: 4.4 MMOL/L (ref 3.5–5)
RBC # BLD: 4.27 E12/L (ref 3.5–5.5)
SODIUM BLD-SCNC: 139 MMOL/L (ref 132–146)
TOTAL PROTEIN: 7.5 G/DL (ref 6.4–8.3)
WBC # BLD: 4.9 E9/L (ref 4.5–11.5)

## 2022-08-16 PROCEDURE — 36415 COLL VENOUS BLD VENIPUNCTURE: CPT

## 2022-08-16 PROCEDURE — 85025 COMPLETE CBC W/AUTO DIFF WBC: CPT

## 2022-08-16 PROCEDURE — 99214 OFFICE O/P EST MOD 30 MIN: CPT | Performed by: INTERNAL MEDICINE

## 2022-08-16 PROCEDURE — 80053 COMPREHEN METABOLIC PANEL: CPT

## 2022-08-16 PROCEDURE — 99212 OFFICE O/P EST SF 10 MIN: CPT

## 2022-08-16 PROCEDURE — 1123F ACP DISCUSS/DSCN MKR DOCD: CPT | Performed by: INTERNAL MEDICINE

## 2022-08-16 NOTE — PROGRESS NOTES
[address]  [facility] MED ONCOLOGY  [address] [facility] [address] [id number]  Dept: Rohit Whalen: [phone]  Attending Progress note      Reason for Visit:   Left Breast DCIS. Referring Physician:  Max Jefferson MD.    PCP:  Rojas Whipple DO    History of Present Illness: The patient is a pleasant 68 y.o. lady, with a past medical history significant for hyperlipidemia, and hypertension, who had presented with an abnormal screening Mammogram:  MAMMOGRAM (12/11/2018): INDICATION:   Screening. HISTORY:   The patient has no personal history of cancer. The patient has the following family history of breast cancer:  maternal aunt. MAMMOGRAM VIEWS:   The following mammographic views where obtained: bilateral craniocaudal; bilateral craniocaudal with tomosynthesis; bilateral mediolateral oblique; and bilateral mediolateral oblique with tomosynthesis       TOMOSYNTHESIS:   Tomosynthesis (3 Dimensional Breast Imaging) was used on this examination to aid in evaluation. COMPARISON:   No prior imaging studies are available for comparison. CAD:   This exam was reviewed using the [employer] Computer Aided Detection (CAD)       TISSUE DENSITY:   The breasts are heterogeneously dense (Type 3 density). MAMMOGRAM FINDINGS:   In the right breast, no suspicious masses, areas of suspicious architectural distortion, suspicious calcifications, or additional suspicious findings are identified. Finding 1:   There are coarse heterogeneous and fine pleomorphic calcifications measuring 17 mm seen in the upper outer quadrant of the left breast.       IMPRESSION:   Calcifications in the left breast require additional evaluation. Spot magnification views with consideration for a stereotactic biopsy if needed.       =======================================   BI-RADS Category 0:   Incomplete: Need Additional Imaging Evaluation =======================================       RISK ASSESSMENT:   During this patient's visit, information obtained was used to generate a lifetime risk assessment using the Tyrer-Cuzick model (also called the BRENNON [International Breast Cancer Intervention Study] Breast Cancer Risk Evaluation Tool). - LIFETIME RISK -   Patient has a Tyrer Cuzick score of 7.9%   - BREAST TISSUE DENSITY -   Heterogeneously dense       -AVERAGE RISK (<15% )   Yearly screening mammograms starting at age 36 and older. Regardless of breast density, tomosynthesis is suggested. Monthly self-breast exams are recommended for women age 27 and older. NOTE:   Mammography is not 100% accurate in the detection of breast cancer. Accuracy decreases as mammographic density of the breast increases. A negative mammogram should not deter further evaluation (including biopsy) of suspicious physical findings. Recommendations are based on NCCN ([address]) and ACR [employer] of Radiology) guidelines. Thank you for sending your patient to this ACR and FDA approved facility. If there are any physician concerns regarding this report, a Radiologist can be reached by calling the following number [phone]. Follow up Protocol for the [facility]:   BI-RADS 1 or 2:  Center will send a reminder when next mammogram is due. BI-RADS 3:  Center will send a reminder for recommended short term follow up. BI-RADS 0, 4 or 5:  Center will contact patient to schedule all additional views and procedures. PATHOLOGY:  Diagnosis:  Left breast, core needle biopsy: Intermediate to high grade ductal  carcinoma in situ (papillary, cribriform, and comedo types)    Comment:    There is no definite evidence of invasive carcinoma on the  calponin and p63 immunostained sections. Detached fragments of carcinoma  without associated stroma are also present. Microcalcifications are  noted. Intradepartmental consultation is obtained. Breast Cancer Marker Studies:  Estrogen Receptors (ER):  Positive:         Percentage of cells positive:  >90%         Intensity:  Strong    Progesterone Receptors (FL):  Positive:         Percentage of cells positive:  90%         Intensity:  Moderate to strong     The patient underwent on 1/18/2019 a Left breast needle localized lumpectomy, pathology:  CANCER CASE SUMMARY  Procedure-excision  Specimen laterality-left  Size (extent) of DCIS: 1.3 x 1.2 x 1.2 cm  Histologic type-ductal carcinoma in situ  Nuclear grade-grade 3 (high)  Necrosis-present, central  Margins (part A)-anterior and lateral margins involved by DCIS; DCIS is 1  mm from posterior margin  Margins (part B)- uninvolved by DCIS   Distance from closest margin: 1 mm from superior margin  Regional lymph nodes-no lymph nodes submitted or found  TNM classification (AJCC eighth edition)-pTis  NX MX  Ancillary studies-see prior report [facility]  for ER/ [place] results    She underwent on 3/26/2019 a left lumpectomy specimen, new posterior margin, additional superior margin, path was neg for residual DCIS. The patient completed adjuvant radiation therapy on 6/6/2019. She was started on Arimidex on 6/18/2019. The patient denies any side effects from the Arimidex, she is feeling well in general, no changes in the back stiffness, no new bony pain. No new breast changes. No bleeding, no melena or hematochezia    Review of Systems;  CONSTITUTIONAL: No fever, chills. Good energy level. Weight is stable since last visit. ENMT: Eyes: No diplopia; Nose: No epistaxis. Mouth: No sore throat. RESPIRATORY: No hemoptysis, shortness of breath, cough. CARDIOVASCULAR: No chest pain, palpitations. GASTROINTESTINAL: No nausea/vomiting, abdominal pain, diarrhea/constipation. GENITOURINARY: No dysuria, urinary frequency, hematuria. NEURO: No syncope, presyncope, headache.   MSK: pos for chronic joints pain. Remainder:  ROS NEGATIVE    Past Medical History:      Diagnosis Date    Allergic reaction 6/22/2021    Cause unclear    Breast cancer ([facility] [address]     Cancer ([facility] [address] 2019    breast    Hyperlipidemia     Hypertension     Pneumonia 1953    Shingles      Patient Active Problem List   Diagnosis    Essential hypertension    Ductal carcinoma in situ (DCIS) of left breast    Allergy to chicken meat    Neck pain, bilateral posterior    Nuclear senile cataract    Mixed hyperlipidemia        Past Surgical History:      Procedure Laterality Date    BREAST BIOPSY Left 2/27/2019    LEFT BREAST NEEDLE LOCALIZED  LUMPECTOMY  WITH Lazarus Ramos --TRIDENT performed by Emmanuel Oh MD at [facility] [address] LUMPECTOMY Left 3/26/2019    LEFT BREAST LUMPECTOMY RE-EXCISION ANTERIOR LATERAL SUPERIOR & POSTERIOR MARGINS , REPLACEMENT OF BIOZORB performed by Emmanuel Oh MD at [address] [place] [address].       Family History:  Family History   Problem Relation Age of Onset    Parkinsonism Mother     Heart Attack Father     Cancer Father 80        leukemia    Cancer Maternal Aunt         Breast--60 or 76s    Cancer Maternal Grandfather         unknown    Cancer Paternal Cousin 16        leukemia       Medications:  Reviewed and reconciled. Social History:  Social History     Socioeconomic History    Marital status: Single     Spouse name: Not on file    Number of children: Not on file    Years of education: Not on file    Highest education level: Not on file   Occupational History    Not on file   Tobacco Use    Smoking status: Never    Smokeless tobacco: Never   Vaping Use    Vaping Use: Never used   Substance and Sexual Activity    Alcohol use:  Yes     Alcohol/week: 1.0 standard drink     Types: 1 Glasses of wine per week     Comment: occasionally    Drug use: No    Sexual activity: Not Currently   Other Topics Concern    Not on file   Social History Narrative    Rosanna Jacinto lives in [place] alone - retired from [employer]. Social Determinants of Health     Financial Resource Strain: Not on file   Food Insecurity: Not on file   Transportation Needs: Not on file   Physical Activity: Not on file   Stress: Not on file   Social Connections: Not on file   Intimate Partner Violence: Not on file   Housing Stability: Not on file       Allergies: Allergies   Allergen Reactions    Poultry Meal Hives    Ampicillin     Valerian     Valine     Hydrocodone-Acetaminophen Rash    Molds & Smuts Other (See Comments)    Pcn [Penicillins] Rash     OB/GYN:  Age of menarche was 8. Age of menopause was 61. Last menstrual period was age 61. Patient denies hormonal therapy. Patient is     Physical Exam:  BP (!) 180/71   Pulse 77   Temp 98.1 °F (36.7 °C)   Ht 4' 10\" (1.473 m)   Wt 152 lb 12.8 oz (69.3 kg)   LMP  (LMP Unknown)   SpO2 100%   BMI 31.94 kg/m²     GENERAL: Alert, oriented x 3, not in acute distress. HEENT: PERRLA; EOMI. Oropharynx clear. NECK: Supple. No palpable cervical or supraclavicular lymphadenopathy. LUNGS: Good air entry bilaterally. No wheezing, crackles or rhonchi. BREASTS: Right breast exam is negative for any skin changes, no nipple discharge, no palpable masses, no palpable axillary lymphadenopathy. The left breast exam is negative for any nipple discharge, she has a scar from her lumpectomy, no palpable masses, no palpable left axillary lymphadenopathy. CARDIOVASCULAR: Regular rate. No murmurs, rubs or gallops. ABDOMEN: Soft. Non-tender, non-distended. Positive bowel sounds. EXTREMITIES: Without clubbing, cyanosis, or edema. NEUROLOGIC: No focal deficits.    ECOG PS 1    Impression/Plan:      The patient is a pleasant 68 y.o. lady, with a past medical history significant for hyperlipidemia, and hypertension, who had presented with an abnormal screening Mammogram, she was diagnosed with a left breast DCIS, she is s/p Left breast needle localized lumpectomy, the DCIS is high grade with central necrosis, final margins negative, final pathologic stage is pTis NX MX, satge 0 disease, ER positive > 90% ID positive, 90%. I discussed with the patient her diagnosis, characteristics of her DCIS, risk of disease recurrence, the increased risk of having an invasive breast cancer in the ipsilateral and contralateral breast. The patient has an ER positive disease I recommended endocrine therapy with an aromatase inhibitor, we discussed the risks and benefits of Arimidex. I had discussed with the patient the side effects of Arimidex, including but not limited to mood changes, hot flashes, joints pain, osteoporosis. She completed adjuvant radiation therapy on 6/6/2019, . She was started on Arimidex on 6/18/2019, she is tolerating it well overall, continue Arimidex. The patient had a DEXA scan done on 6/15/2021, revealing normal bone density, compared to June 2020, bone density demonstrates interval increase in the lumbar spine but decreased in both hips. Continue calcium and vitamin D. Her insurance would not allow a repeat DEXA scan until June 2023. I discussed with her the importance of healthy lifestyle, monthly breast self-examination, as well as routine screening mammograms, she had bilateral screening mammogram done on 12/16/2021, , was negative for malignancy, will be due again on 12/17/2022. I reviewed with the patient surveillance guidelines. Continue with surveillance. Mild anemia, normocytic,  fit test was negative, a work-up was ordered, she does not have iron vitamin B12, folate deficiency. Radha test is negative, sh is normal, reticulocytes 1.2%, the results were reviewed with the patient, hemoglobin/hematocrit had improved. Repeat CBC today is remarkable for a hemoglobin of 12, hematocrit 36.6, normal white count and platelet count, we will continue to monitor her CBCD. RTC in 3 months.     Thank you for allowing us to participate in the care of Mrs. Staley.     Savannah Cavazos MD   HEMATOLOGY/MEDICAL [address] [address] [place] MED ONCOLOGY  [address] [place] [id number]  Dept: [address] Penelope Whalen: [phone]

## 2022-08-22 DIAGNOSIS — I10 ESSENTIAL HYPERTENSION: ICD-10-CM

## 2022-08-23 RX ORDER — CHLORTHALIDONE 25 MG/1
25 TABLET ORAL DAILY
Qty: 90 TABLET | Refills: 1 | Status: SHIPPED | OUTPATIENT
Start: 2022-08-23

## 2022-08-23 RX ORDER — LOSARTAN POTASSIUM 50 MG/1
50 TABLET ORAL DAILY
Qty: 90 TABLET | Refills: 1 | Status: SHIPPED | OUTPATIENT
Start: 2022-08-23

## 2022-08-23 NOTE — TELEPHONE ENCOUNTER
Last Appointment   4/5/2022  Next Appointment  Visit date not found      Return in about 6 months (around 10/5/2022) for FU HTN.

## 2022-10-10 ENCOUNTER — OFFICE VISIT (OUTPATIENT)
Dept: FAMILY MEDICINE CLINIC | Age: 77
End: 2022-10-10
Payer: COMMERCIAL

## 2022-10-10 VITALS
OXYGEN SATURATION: 97 % | WEIGHT: 155.4 LBS | SYSTOLIC BLOOD PRESSURE: 143 MMHG | DIASTOLIC BLOOD PRESSURE: 59 MMHG | HEART RATE: 63 BPM | TEMPERATURE: 97.3 F | BODY MASS INDEX: 32.62 KG/M2 | HEIGHT: 58 IN

## 2022-10-10 DIAGNOSIS — I10 ESSENTIAL HYPERTENSION: Primary | ICD-10-CM

## 2022-10-10 DIAGNOSIS — Z23 NEEDS FLU SHOT: ICD-10-CM

## 2022-10-10 PROCEDURE — 90694 VACC AIIV4 NO PRSRV 0.5ML IM: CPT | Performed by: FAMILY MEDICINE

## 2022-10-10 PROCEDURE — 1123F ACP DISCUSS/DSCN MKR DOCD: CPT | Performed by: FAMILY MEDICINE

## 2022-10-10 PROCEDURE — 90471 IMMUNIZATION ADMIN: CPT | Performed by: FAMILY MEDICINE

## 2022-10-10 PROCEDURE — 99213 OFFICE O/P EST LOW 20 MIN: CPT | Performed by: FAMILY MEDICINE

## 2022-10-10 SDOH — ECONOMIC STABILITY: FOOD INSECURITY: WITHIN THE PAST 12 MONTHS, THE FOOD YOU BOUGHT JUST DIDN'T LAST AND YOU DIDN'T HAVE MONEY TO GET MORE.: NEVER TRUE

## 2022-10-10 SDOH — ECONOMIC STABILITY: FOOD INSECURITY: WITHIN THE PAST 12 MONTHS, YOU WORRIED THAT YOUR FOOD WOULD RUN OUT BEFORE YOU GOT MONEY TO BUY MORE.: NEVER TRUE

## 2022-10-10 ASSESSMENT — SOCIAL DETERMINANTS OF HEALTH (SDOH): HOW HARD IS IT FOR YOU TO PAY FOR THE VERY BASICS LIKE FOOD, HOUSING, MEDICAL CARE, AND HEATING?: NOT HARD AT ALL

## 2022-10-10 ASSESSMENT — PATIENT HEALTH QUESTIONNAIRE - PHQ9
SUM OF ALL RESPONSES TO PHQ QUESTIONS 1-9: 0
SUM OF ALL RESPONSES TO PHQ QUESTIONS 1-9: 0
2. FEELING DOWN, DEPRESSED OR HOPELESS: 0
SUM OF ALL RESPONSES TO PHQ9 QUESTIONS 1 & 2: 0
SUM OF ALL RESPONSES TO PHQ QUESTIONS 1-9: 0
SUM OF ALL RESPONSES TO PHQ QUESTIONS 1-9: 0
1. LITTLE INTEREST OR PLEASURE IN DOING THINGS: 0

## 2022-10-10 NOTE — PROGRESS NOTES
[facility]  Precepting Note    Subjective:  HTN: BP is controlled overall  Systolic is slightly elevated today  Doing well on meds  No cp, sob    ROS otherwise negative     Past medical, surgical, family and social history were reviewed, non-contributory, and unchanged unless otherwise stated. Objective:    BP (!) 143/59   Pulse 63   Temp 97.3 °F (36.3 °C) (Temporal)   Ht 4' 10\" (1.473 m)   Wt 155 lb 6.4 oz (70.5 kg)   LMP  (LMP Unknown)   SpO2 97%   BMI 32.48 kg/m²     Exam is as noted by resident with the following changes, additions or corrections:    General:  NAD; alert & oriented x 3   Heart:  RRR, no murmurs, gallops, or rubs. Lungs:  CTA bilaterally, no wheeze, rales or rhonchi  Extrem:  No clubbing, cyanosis, or edema    Assessment/Plan:  HTN  Continue current meds  Monitor BP  HM: update  F/u as instructed     Attending Physician Statement  I have reviewed the chart, including any radiology or labs. I have discussed the case, including pertinent history and exam findings with the resident. I agree with the assessment, plan and orders as documented by the resident. Please refer to the resident note for additional information.       Electronically signed by Mario Briggs MD on 10/10/2022 at 11:43 AM

## 2022-10-10 NOTE — PROGRESS NOTES
[facility]  Department of Family Medicine  Family Medicine Residency Program      Patient:  Lorne Lancaster 68 y.o. female  Date of Service: 10/10/22      Chief complaint:   Chief Complaint   Patient presents with    Hypertension         History ofPresent Illness   Lorne Lancaster is a 68 y.o. female who presents to the clinic with complaints as above. Hypertension  BP Readings from Last 3 Encounters:   10/10/22 (!) 143/59   08/16/22 (!) 140/68   06/08/22 124/60   Blood pressures controlled at home  Medications include chlorthalidone 25mg daily, losartan 50mg dialy, taking as prescribed  Denies symptoms of shakiness, dizziness, lightheadedness, high or low blood pressure  Feels well, no complaints    She got her flu shot today!     Past Medical History:      Diagnosis Date    Allergic reaction 6/22/2021    Cause unclear    Breast cancer ([facility] [address]     Cancer ([facility] [address] 2019    breast    Hyperlipidemia     Hypertension     Pneumonia 1953    Shingles        Past Surgical History:        Procedure Laterality Date    BREAST BIOPSY Left 2/27/2019    LEFT BREAST NEEDLE LOCALIZED  LUMPECTOMY  WITH BIOZORB  PLACEMENT --TRIDENT performed by Maxim Nazario MD at [place] [address] LUMPECTOMY Left 3/26/2019    LEFT BREAST LUMPECTOMY RE-EXCISION ANTERIOR LATERAL SUPERIOR & POSTERIOR MARGINS , REPLACEMENT OF BIOZORB performed by Maxim Nazario MD at [address] [place] [address].       Allergies:    Poultry meal, Ampicillin, Valerian, Valine, Hydrocodone-acetaminophen, Molds & smuts, and Pcn [penicillins]    Social History:   Social History     Socioeconomic History    Marital status: Single     Spouse name: Not on file    Number of children: Not on file    Years of education: Not on file    Highest education level: Not on file   Occupational History    Not on file   Tobacco Use    Smoking status: Never    Smokeless tobacco: Never Vaping Use    Vaping Use: Never used   Substance and Sexual Activity    Alcohol use: Yes     Alcohol/week: 1.0 standard drink     Types: 1 Glasses of wine per week     Comment: occasionally    Drug use: No    Sexual activity: Not Currently   Other Topics Concern    Not on file   Social History Narrative    Sonja Irvin lives in [place] alone - retired from [employer]. Social Determinants of Health     Financial Resource Strain: Low Risk     Difficulty of Paying Living Expenses: Not hard at all   Food Insecurity: No Food Insecurity    Worried About Running Out of Food in the Last Year: Never true    Ran Out of Food in the Last Year: Never true   Transportation Needs: Not on file   Physical Activity: Not on file   Stress: Not on file   Social Connections: Not on file   Intimate Partner Violence: Not on file   Housing Stability: Not on file        Family History:       Problem Relation Age of Onset    Parkinsonism Mother     Heart Attack Father     Cancer Father 80        leukemia    Cancer Maternal Aunt         Breast--60 or 76s    Cancer Maternal Grandfather         unknown    Cancer Paternal Cousin 16        leukemia       Medication List:    Current Outpatient Medications   Medication Sig Dispense Refill    chlorthalidone (HYGROTON) 25 MG tablet TAKE 1 TABLET BY MOUTH DAILY 90 tablet 1    losartan (COZAAR) 50 MG tablet TAKE 1 TABLET BY MOUTH DAILY 90 tablet 1    atorvastatin (LIPITOR) 40 MG tablet TAKE 1 TABLET BY MOUTH DAILY 90 tablet 1    potassium chloride (KLOR-CON M) 20 MEQ extended release tablet Take one tablet by mouth daily at the same time as your chlorthalidone (or Hygroton) pill.  90 tablet 1    anastrozole (ARIMIDEX) 1 MG tablet TAKE 1 TABLET BY MOUTH DAILY 90 tablet 2    calcium carbonate-vitamin D (CALCIUM 600 + D) 600-400 MG-UNIT TABS per tab Take 1 tablet by mouth 2 times daily 180 tablet 1    ibuprofen (ADVIL;MOTRIN) 200 MG CAPS Take 1 capsule by mouth as needed for Pain      Multiple Vitamins-Minerals (EYE VITAMINS) CAPS Take by mouth 2 times daily       No current facility-administered medications for this visit. Review of Systems:   Review of Systems as per HPI    Physical Exam   Vitals: BP (!) 143/59   Pulse 63   Temp 97.3 °F (36.3 °C) (Temporal)   Ht 4' 10\" (1.473 m)   Wt 155 lb 6.4 oz (70.5 kg)   LMP  (LMP Unknown)   SpO2 97%   BMI 32.48 kg/m²   Physical Exam  Vitals and nursing note reviewed. Constitutional:       General: She is not in acute distress. Appearance: Normal appearance. HENT:      Head: Normocephalic and atraumatic. Eyes:      General:         Right eye: No discharge. Left eye: No discharge. Cardiovascular:      Rate and Rhythm: Normal rate and regular rhythm. Heart sounds: No murmur heard. Pulmonary:      Effort: Pulmonary effort is normal.      Breath sounds: Normal breath sounds. No wheezing. Abdominal:      General: Bowel sounds are normal.      Palpations: Abdomen is soft. Musculoskeletal:      Cervical back: Normal range of motion. No rigidity. Right lower leg: No edema. Left lower leg: No edema. Skin:     General: Skin is warm and dry. Neurological:      Mental Status: She is alert and oriented to person, place, and time. Mental status is at baseline. Assessment and Plan     1. Essential hypertension  Adequately controlled at this time  Continue home chlorthalidone 25mg daily and losartan 50mg daily, refills not needed yet  FU 6 months    2. Needs flu shot  - Influenza, FLUAD, (age 72 y+), IM, Preservative Free, 0.5 mL    Counseled regarding above diagnosis, including possible risks and complications, especially if left uncontrolled. Counseled regarding the possible side effects, risks, benefits and alternatives to treatment; patient and/or guardian verbalizes understanding, agrees, feels comfortable with, and wishes to proceed with above treatment plan.     Call or go to ED immediately if symptoms worsen or persist. Advised patient to call with any new medication issues and, as applicable, read all Rx info from pharmacy to assure aware of all possible risks and side effects of medication before taking. Patient and/or guardian given opportunity to ask questions/raise concerns. The patient verbalized comfort and understanding of instructions. I encourage further reading and education about your health conditions. Information on many health conditions is provided by the American Academy of Family Physicians: https://familydoctor. org/  Please bring any questions to me at your next visit. Return to Office: Return in about 6 months (around 4/10/2023) for FU HTN.     Hosea Louise DO

## 2022-10-19 DIAGNOSIS — D05.12 DUCTAL CARCINOMA IN SITU (DCIS) OF LEFT BREAST: ICD-10-CM

## 2022-10-19 RX ORDER — ANASTROZOLE 1 MG/1
1 TABLET ORAL DAILY
Qty: 90 TABLET | Refills: 2 | Status: SHIPPED | OUTPATIENT
Start: 2022-10-19

## 2022-10-24 DIAGNOSIS — I10 ESSENTIAL HYPERTENSION: ICD-10-CM

## 2022-10-24 RX ORDER — POTASSIUM CHLORIDE 20 MEQ/1
TABLET, EXTENDED RELEASE ORAL
Qty: 90 TABLET | Refills: 1 | Status: SHIPPED | OUTPATIENT
Start: 2022-10-24

## 2022-11-08 ENCOUNTER — OFFICE VISIT (OUTPATIENT)
Dept: ONCOLOGY | Age: 77
End: 2022-11-08
Payer: COMMERCIAL

## 2022-11-08 ENCOUNTER — HOSPITAL ENCOUNTER (OUTPATIENT)
Dept: INFUSION THERAPY | Age: 77
Discharge: HOME | End: 2022-11-08
Payer: COMMERCIAL

## 2022-11-08 VITALS
SYSTOLIC BLOOD PRESSURE: 150 MMHG | TEMPERATURE: 97.3 F | HEIGHT: 58 IN | RESPIRATION RATE: 16 BRPM | WEIGHT: 156 LBS | HEART RATE: 74 BPM | BODY MASS INDEX: 32.75 KG/M2 | DIASTOLIC BLOOD PRESSURE: 66 MMHG | OXYGEN SATURATION: 99 %

## 2022-11-08 DIAGNOSIS — D64.9 ANEMIA, UNSPECIFIED TYPE: ICD-10-CM

## 2022-11-08 DIAGNOSIS — D05.12 DUCTAL CARCINOMA IN SITU (DCIS) OF LEFT BREAST: ICD-10-CM

## 2022-11-08 DIAGNOSIS — D05.12 DUCTAL CARCINOMA IN SITU (DCIS) OF LEFT BREAST: Primary | ICD-10-CM

## 2022-11-08 LAB
ALBUMIN SERPL-MCNC: 3.9 G/DL (ref 3.5–5.2)
ALP BLD-CCNC: 113 U/L (ref 35–104)
ALT SERPL-CCNC: 11 U/L (ref 0–32)
ANION GAP SERPL CALCULATED.3IONS-SCNC: 14 MMOL/L (ref 7–16)
AST SERPL-CCNC: 19 U/L (ref 0–31)
BASOPHILS ABSOLUTE: 0.07 E9/L (ref 0–0.2)
BASOPHILS RELATIVE PERCENT: 1.3 % (ref 0–2)
BILIRUB SERPL-MCNC: 0.4 MG/DL (ref 0–1.2)
BUN BLDV-MCNC: 21 MG/DL (ref 6–23)
CALCIUM SERPL-MCNC: 10.1 MG/DL (ref 8.6–10.2)
CHLORIDE BLD-SCNC: 100 MMOL/L (ref 98–107)
CO2: 24 MMOL/L (ref 22–29)
CREAT SERPL-MCNC: 0.9 MG/DL (ref 0.5–1)
EOSINOPHILS ABSOLUTE: 0.41 E9/L (ref 0.05–0.5)
EOSINOPHILS RELATIVE PERCENT: 7.8 % (ref 0–6)
GFR SERPL CREATININE-BSD FRML MDRD: >60 ML/MIN/1.73
GLUCOSE BLD-MCNC: 144 MG/DL (ref 74–99)
HCT VFR BLD CALC: 34.5 % (ref 34–48)
HEMOGLOBIN: 11.3 G/DL (ref 11.5–15.5)
IMMATURE GRANULOCYTES #: 0.02 E9/L
IMMATURE GRANULOCYTES %: 0.4 % (ref 0–5)
LYMPHOCYTES ABSOLUTE: 1.16 E9/L (ref 1.5–4)
LYMPHOCYTES RELATIVE PERCENT: 22.1 % (ref 20–42)
MCH RBC QN AUTO: 27.8 PG (ref 26–35)
MCHC RBC AUTO-ENTMCNC: 32.8 % (ref 32–34.5)
MCV RBC AUTO: 84.8 FL (ref 80–99.9)
MONOCYTES ABSOLUTE: 0.72 E9/L (ref 0.1–0.95)
MONOCYTES RELATIVE PERCENT: 13.7 % (ref 2–12)
NEUTROPHILS ABSOLUTE: 2.86 E9/L (ref 1.8–7.3)
NEUTROPHILS RELATIVE PERCENT: 54.7 % (ref 43–80)
PDW BLD-RTO: 14 FL (ref 11.5–15)
PLATELET # BLD: 204 E9/L (ref 130–450)
PMV BLD AUTO: 11.4 FL (ref 7–12)
POTASSIUM SERPL-SCNC: 3.8 MMOL/L (ref 3.5–5)
RBC # BLD: 4.07 E12/L (ref 3.5–5.5)
SODIUM BLD-SCNC: 138 MMOL/L (ref 132–146)
TOTAL PROTEIN: 7.7 G/DL (ref 6.4–8.3)
WBC # BLD: 5.2 E9/L (ref 4.5–11.5)

## 2022-11-08 PROCEDURE — 99212 OFFICE O/P EST SF 10 MIN: CPT

## 2022-11-08 PROCEDURE — G0463 HOSPITAL OUTPT CLINIC VISIT: HCPCS

## 2022-11-08 PROCEDURE — 85025 COMPLETE CBC W/AUTO DIFF WBC: CPT

## 2022-11-08 PROCEDURE — 99214 OFFICE O/P EST MOD 30 MIN: CPT | Performed by: INTERNAL MEDICINE

## 2022-11-08 PROCEDURE — 1123F ACP DISCUSS/DSCN MKR DOCD: CPT | Performed by: INTERNAL MEDICINE

## 2022-11-08 PROCEDURE — 3078F DIAST BP <80 MM HG: CPT | Performed by: INTERNAL MEDICINE

## 2022-11-08 PROCEDURE — 3074F SYST BP LT 130 MM HG: CPT | Performed by: INTERNAL MEDICINE

## 2022-11-08 PROCEDURE — 36415 COLL VENOUS BLD VENIPUNCTURE: CPT

## 2022-11-08 PROCEDURE — 80053 COMPREHEN METABOLIC PANEL: CPT

## 2022-11-08 NOTE — PROGRESS NOTES
[address]  [facility] MED ONCOLOGY  [address] [facility] [address] [id number]  Dept: Rohit Penelope Koby: [phone]  Attending Progress note      Reason for Visit:   Left Breast DCIS. Referring Physician:  Kam Miller MD.    PCP:  Jason Garcia DO    History of Present Illness: The patient is a pleasant 68 y.o. lady, with a past medical history significant for hyperlipidemia, and hypertension, who had presented with an abnormal screening Mammogram:  MAMMOGRAM (12/11/2018): INDICATION:   Screening. HISTORY:   The patient has no personal history of cancer. The patient has the following family history of breast cancer:  maternal aunt. MAMMOGRAM VIEWS:   The following mammographic views where obtained: bilateral craniocaudal; bilateral craniocaudal with tomosynthesis; bilateral mediolateral oblique; and bilateral mediolateral oblique with tomosynthesis       TOMOSYNTHESIS:   Tomosynthesis (3 Dimensional Breast Imaging) was used on this examination to aid in evaluation. COMPARISON:   No prior imaging studies are available for comparison. CAD:   This exam was reviewed using the Nanoference Computer Aided Detection (CAD)       TISSUE DENSITY:   The breasts are heterogeneously dense (Type 3 density). MAMMOGRAM FINDINGS:   In the right breast, no suspicious masses, areas of suspicious architectural distortion, suspicious calcifications, or additional suspicious findings are identified. Finding 1:   There are coarse heterogeneous and fine pleomorphic calcifications measuring 17 mm seen in the upper outer quadrant of the left breast.       IMPRESSION:   Calcifications in the left breast require additional evaluation. Spot magnification views with consideration for a stereotactic biopsy if needed.       =======================================   BI-RADS Category 0:   Incomplete: Need Additional Imaging Evaluation =======================================       RISK ASSESSMENT:   During this patient's visit, information obtained was used to generate a lifetime risk assessment using the Tyrer-Cuzick model (also called the BRENNON [International Breast Cancer Intervention Study] Breast Cancer Risk Evaluation Tool). - LIFETIME RISK -   Patient has a Tyrer Cuzick score of 7.9%   - BREAST TISSUE DENSITY -   Heterogeneously dense       -AVERAGE RISK (<15% )   Yearly screening mammograms starting at age 36 and older. Regardless of breast density, tomosynthesis is suggested. Monthly self-breast exams are recommended for women age 27 and older. NOTE:   Mammography is not 100% accurate in the detection of breast cancer. Accuracy decreases as mammographic density of the breast increases. A negative mammogram should not deter further evaluation (including biopsy) of suspicious physical findings. Recommendations are based on NCCN ([address]) and ACR [employer] of Radiology) guidelines. Thank you for sending your patient to this ACR and FDA approved facility. If there are any physician concerns regarding this report, a Radiologist can be reached by calling the following number [phone]. Follow up Protocol for the [facility]:   BI-RADS 1 or 2:  Center will send a reminder when next mammogram is due. BI-RADS 3:  Center will send a reminder for recommended short term follow up. BI-RADS 0, 4 or 5:  Center will contact patient to schedule all additional views and procedures. PATHOLOGY:  Diagnosis:  Left breast, core needle biopsy: Intermediate to high grade ductal  carcinoma in situ (papillary, cribriform, and comedo types)    Comment:    There is no definite evidence of invasive carcinoma on the  calponin and p63 immunostained sections. Detached fragments of carcinoma  without associated stroma are also present. Microcalcifications are  noted. Intradepartmental consultation is obtained. Breast Cancer Marker Studies:  Estrogen Receptors (ER):  Positive:         Percentage of cells positive:  >90%         Intensity:  Strong    Progesterone Receptors (TX):  Positive:         Percentage of cells positive:  90%         Intensity:  Moderate to strong     The patient underwent on 1/18/2019 a Left breast needle localized lumpectomy, pathology:  CANCER CASE SUMMARY  Procedure-excision  Specimen laterality-left  Size (extent) of DCIS: 1.3 x 1.2 x 1.2 cm  Histologic type-ductal carcinoma in situ  Nuclear grade-grade 3 (high)  Necrosis-present, central  Margins (part A)-anterior and lateral margins involved by DCIS; DCIS is 1  mm from posterior margin  Margins (part B)- uninvolved by DCIS   Distance from closest margin: 1 mm from superior margin  Regional lymph nodes-no lymph nodes submitted or found  TNM classification (AJCC eighth edition)-pTis  NX MX  Ancillary studies-see prior report [facility]  for ER/ TX results    She underwent on 3/26/2019 a left lumpectomy specimen, new posterior margin, additional superior margin, path was neg for residual DCIS. The patient completed adjuvant radiation therapy on 6/6/2019. She was started on Arimidex on 6/18/2019. The patient denies any side effects from the Arimidex, she is feeling well in general, no changes in the back stiffness, no new bony pain. No new problems. Review of Systems;  CONSTITUTIONAL: No fever, chills. Good energy level. Weight is stable since last visit. ENMT: Eyes: No diplopia; Nose: No epistaxis. Mouth: No sore throat. RESPIRATORY: No hemoptysis, shortness of breath, cough. CARDIOVASCULAR: No chest pain, palpitations. GASTROINTESTINAL: No nausea/vomiting, abdominal pain, diarrhea/constipation. GENITOURINARY: No dysuria, urinary frequency, hematuria. NEURO: No syncope, presyncope, headache. MSK: pos for chronic joints pain.   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       Family History:  Family History   Problem Relation Age of Onset    Parkinsonism Mother     Heart Attack Father     Cancer Father 80        leukemia    Cancer Maternal Aunt         Breast--60 or 76s    Cancer Maternal Grandfather         unknown    Cancer Paternal Cousin 16        leukemia       Medications:  Reviewed and reconciled. Social History:  Social History     Socioeconomic History    Marital status: Single     Spouse name: Not on file    Number of children: Not on file    Years of education: Not on file    Highest education level: Not on file   Occupational History    Not on file   Tobacco Use    Smoking status: Never    Smokeless tobacco: Never   Vaping Use    Vaping Use: Never used   Substance and Sexual Activity    Alcohol use:  Yes     Alcohol/week: 1.0 standard drink     Types: 1 Glasses of wine per week     Comment: occasionally    Drug use: No    Sexual activity: Not Currently   Other Topics Concern    Not on file   Social History Narrative    Jose Medel lives in [place] alone - retired from [employer]. Social Determinants of Health     Financial Resource Strain: Low Risk     Difficulty of Paying Living Expenses: Not hard at all   Food Insecurity: No Food Insecurity    Worried About Running Out of Food in the Last Year: Never true    Ran Out of Food in the Last Year: Never true   Transportation Needs: Not on file   Physical Activity: Not on file   Stress: Not on file   Social Connections: Not on file   Intimate Partner Violence: Not on file   Housing Stability: Not on file       Allergies: Allergies   Allergen Reactions    Poultry Meal Hives    Ampicillin     Valerian     Valine     Hydrocodone-Acetaminophen Rash    Molds & Smuts Other (See Comments)    Pcn [Penicillins] Rash     OB/GYN:  Age of menarche was 8. Age of menopause was 61. Last menstrual period was age 61. Patient denies hormonal therapy. Patient is     Physical Exam:  BP (!) 150/66   Pulse 74   Temp 97.3 °F (36.3 °C) (Infrared)   Resp 16   Ht 4' 10\" (1.473 m)   Wt 156 lb (70.8 kg)   LMP  (LMP Unknown)   SpO2 99%   BMI 32.60 kg/m²     GENERAL: Alert, oriented x 3, not in acute distress. HEENT: PERRLA; EOMI. Oropharynx clear. NECK: Supple. No palpable cervical or supraclavicular lymphadenopathy. LUNGS: Good air entry bilaterally. No wheezing, crackles or rhonchi. BREASTS: Right breast exam is negative for any skin changes, no nipple discharge, no palpable masses, no palpable axillary lymphadenopathy. The left breast exam is negative for any nipple discharge, she has a scar from her lumpectomy, no palpable masses, no palpable left axillary lymphadenopathy. CARDIOVASCULAR: Regular rate. No murmurs, rubs or gallops. ABDOMEN: Soft. Non-tender, non-distended. Positive bowel sounds. EXTREMITIES: Without clubbing, cyanosis, or edema. NEUROLOGIC: No focal deficits.    ECOG PS 1    Impression/Plan:      The patient is a pleasant 68 y.o. lady, with a past medical history significant for hyperlipidemia, and hypertension, who had presented with an abnormal screening Mammogram, she was diagnosed with a left breast DCIS, she is s/p Left breast needle localized lumpectomy, the DCIS is high grade with central necrosis, final margins negative, final pathologic stage is pTis NX MX, satge 0 disease, ER positive > 90% SD positive, 90%. I discussed with the patient her diagnosis, characteristics of her DCIS, risk of disease recurrence, the increased risk of having an invasive breast cancer in the ipsilateral and contralateral breast. The patient has an ER positive disease I recommended endocrine therapy with an aromatase inhibitor, we discussed the risks and benefits of Arimidex. I had discussed with the patient the side effects of Arimidex, including but not limited to mood changes, hot flashes, joints pain, osteoporosis. She completed adjuvant radiation therapy on 6/6/2019 . She was started on Arimidex on 6/18/2019, she is tolerating it well overall, continue Arimidex. The patient had a DEXA scan done on 6/15/2021, revealing normal bone density, compared to June 2020, bone density demonstrates interval increase in the lumbar spine but decreased in both hips. Continue calcium and vitamin D. Her insurance would not allow a repeat DEXA scan until June 2023. We will discontinue the DEXA scan order and reorder next year. I discussed with her the importance of healthy lifestyle, monthly breast self-examination, as well as routine screening mammograms, she had bilateral screening mammogram done on 12/16/2021, was negative for malignancy, will be due again on 12/17/2022. Await results. I reviewed with the patient surveillance guidelines. Continue with surveillance. Mild anemia, normocytic,  fit test was negative, a work-up was ordered, she does not have iron vitamin B12, folate deficiency.   Radha test is negative, sh is normal, reticulocytes 1.2%, the results were reviewed with the patient, hemoglobin was 12 G/DL on 8/16/2022, had decreased to 11.3G/DL, hematocrit 34.5, we will continue to monitor her CBCD. RTC in 3 months. Thank you for allowing us to participate in the care of Mrs. Staley.     Ayad Sullivan MD   HEMATOLOGY/MEDICAL [address] [place] MED ONCOLOGY  [address] [place] [id number]  Dept: [facility] Koby: [phone]

## 2022-11-24 DIAGNOSIS — E78.2 MIXED HYPERLIPIDEMIA: ICD-10-CM

## 2022-11-28 RX ORDER — ATORVASTATIN CALCIUM 40 MG/1
40 TABLET, FILM COATED ORAL DAILY
Qty: 90 TABLET | Refills: 1 | Status: SHIPPED | OUTPATIENT
Start: 2022-11-28

## 2022-11-28 NOTE — TELEPHONE ENCOUNTER
Last Appointment   10/10/2022  Next Appointment  Visit date not found      Return in about 6 months (around 4/10/2023) for FU HTN.

## 2022-12-12 ASSESSMENT — ENCOUNTER SYMPTOMS
BACK PAIN: 1
SHORTNESS OF BREATH: 0
COUGH: 0

## 2022-12-12 NOTE — PROGRESS NOTES
Subjective: This note was copied forward from the last encounter. Essential components for this patient record were reviewed and verified on this visit including:  recent hospitalizations, recent imaging, PMH, PSH, FH, SOC HX, Allergies, and Medications were reviewed and updated as appropriate. In addition, the assessment and plan were copied from prior office note and updated accordingly. Patient ID: Devonte Galan is a 68 y.o. female. SHOBHA Orourke is a pleasant 68 y.o. lady, with a past medical history significant for hyperlipidemia, and hypertension, who had presented with an abnormal screening Mammogram:      12/11/2018 bilateral screening mammogram noted calcifications in the left upper outer quadrant measuring 17 mm.  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 Detached fragments of carcinoma without associated stroma are also present. Microcalcifications are noted. Intradepartmental consultation is obtained.     Breast Cancer Marker Studies:  Estrogen Receptors (ER):  Positive:         Percentage of cells positive:  >90%         Intensity:  Strong    Progesterone Receptors (NY):  Positive:         Percentage of cells positive:  90%         Intensity:  Moderate to strong     01/18/2019 Underwent a Left breast needle localized lumpectomy, pathology:  CANCER CASE SUMMARY  Procedure-excision  Specimen laterality-left  Size (extent) of DCIS: 1.3 x 1.2 x 1.2 cm  Histologic type-ductal carcinoma in situ  Nuclear grade-grade 3 (high)  Necrosis-present, central  Margins (part A)-anterior and lateral margins involved by DCIS; DCIS is 1  mm from posterior margin  Margins (part B)- uninvolved by DCIS   Distance from closest margin: 1 mm from superior margin  Regional lymph nodes-no lymph nodes submitted or found  TNM classification (AJCC eighth edition)-pTis  NX MX  Ancillary studies-see prior report [facility]  for ER/ DE results     03/26/2019 a left lumpectomy specimen, new posterior margin, additional superior margin, path was neg for residual DCIS.  06/06/2019 completed adjuvant radiation therapy   06/18/2019 endocrine therapy with Arimidex 1 mg by mouth daily was started per Dr. Derek Nuñez. Review of Systems   Constitutional:  Negative for activity change, appetite change, chills, fatigue, fever and unexpected weight change. She continues to do well. Persistent scattered arthralgias,most notable of the B/L hips; Remains stable and not interfering with her quality of life. HENT:  Negative for postnasal drip. Respiratory:  Negative for cough and shortness of breath. Cardiovascular:  Negative for chest pain and palpitations. Musculoskeletal:  Positive for arthralgias and back pain. Negative for gait problem and joint swelling. Right shoulder pain has resolved since she cut back on crocheting. Neurological:  Negative for headaches. Psychiatric/Behavioral:  The patient is not nervous/anxious. Objective:   Physical Exam  Vitals and nursing note reviewed. Constitutional:       General: She is not in acute distress. Appearance: Normal appearance. She is well-developed. She is not diaphoretic. Comments: ECOG is stable at 0. No apparent distress. HENT:      Head: Normocephalic and atraumatic. Mouth/Throat:      Pharynx: No oropharyngeal exudate. Eyes:      General: No scleral icterus. Right eye: No discharge. Left eye: No discharge. Conjunctiva/sclera: Conjunctivae normal.   Neck:      Thyroid: No thyromegaly. Vascular: No JVD. Trachea: No tracheal deviation. Cardiovascular:      Rate and Rhythm: Normal rate and regular rhythm. Heart sounds: No murmur heard.     No friction rub. No gallop. Pulmonary:      Effort: Pulmonary effort is normal. No respiratory distress or retractions. Breath sounds: Normal breath sounds. No stridor. No wheezing or rales. Chest:      Chest wall: No mass, lacerations, deformity, swelling, tenderness or edema. Breasts:     Breasts are asymmetrical (Left breast smaller than right in the post treatment setting.). Right: No inverted nipple, mass, nipple discharge, skin change or tenderness. Left: No inverted nipple, mass, nipple discharge, skin change or tenderness. Comments: Right breast exam remains stable and unremarkable. Abdominal:      General: There is no distension. Palpations: Abdomen is soft. Tenderness: There is no abdominal tenderness. There is no guarding or rebound. Musculoskeletal:         General: No tenderness or deformity. Right shoulder: Normal.      Left shoulder: Normal range of motion (ROM of LUE has improved with change in activity. ). Cervical back: Normal range of motion and neck supple. Lymphadenopathy:      Cervical: No cervical adenopathy. Right cervical: No superficial, deep or posterior cervical adenopathy. Left cervical: No superficial, deep or posterior cervical adenopathy. Upper Body:      Right upper body: No pectoral adenopathy. Left upper body: No pectoral adenopathy. Skin:     General: Skin is warm and dry. Coloration: Skin is not pale. Findings: No erythema or rash. Neurological:      Mental Status: She is alert and oriented to person, place, and time. Coordination: Coordination normal.   Psychiatric:         Behavior: Behavior normal.         Thought Content: Thought content normal.         Judgment: Judgment normal.     Assessment:     68 y.o. Extremely pleasant female with DCIS left breast s/p lumpectomy. ER/SC positive disease.     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 Detached fragments of carcinoma without associated stroma are also present. Microcalcifications are noted. Intradepartmental consultation is obtained. Breast Cancer Marker Studies:  Estrogen Receptors (ER): Positive: >90%  Intensity:  Strong  Progesterone Receptors (NY) Positive @ 90%. Intensity:  Moderate to strong     01/18/2019 she underwent Left breast needle localized lumpectomy, pathology:    CANCER CASE SUMMARY left breast DCIS. Size 1.3 x 1.2 x 1.2 cm. High-grade with central necrosis. Margins (part A)-anterior and lateral margins involved by DCIS; DCIS is 1 mm from posterior margin  Margins (part B)- uninvolved by DCIS    Distance from closest margin: 1 mm from superior margin. No lymph nodes submitted. TNM classification (AJCC eighth edition)-pTis  NX MX     03/26/2019 a left lumpectomy specimen, new posterior margin, additional superior margin, path was neg for residual DCIS.  06/06/2019 completed adjuvant radiation therapy. 06/18/2019 started ET with Arimidex 1 mg by mouth daily. DEXA bone density 05/09/2020: Normal.  On Ca+/Vit D daily. 12/14/2020 B/L screening mammogram:  Benign, BI-RADS 2.      12/19/2022 bilateral screening mammogram: Negative, BI-RADS 1. We reviewed her breast imaging today for educational (not interpretive) purposes on this visit. We discussed breast anatomy, breast density as assigned by radiology, the bi-rads result, and reviewed the purpose of any biopsy or surgical clips (did not verify placement) noted on imaging. In addition, we discussed any changes on imaging as they relate to post procedure/post treatment.   It was clearly stated to Aguilar Plata that interpretation of imaging and the final result of imaging is at the discretion of the reading radiologist.  Her clinical follow-up is without evidence of recurrent disease. Her range of motion of the left upper extremity has improved significantly since she has stopped crocheting. She does complain of Planter fasciitis type symptoms in the left lower extremity; offered referral to podiatry which she declined at this time. Continues to tolerate anastrozole well and reports the arthralgias have actually somewhat diminished. We will plan to see in 6 months for clinical follow-up and as needed. Plan:    Continue monthly breast/chest wall self examination; detailed instructions reviewed today. Bring any changes to your physician's attention. Continue healthy diet and exercise routinely as tolerated. Maintaining ideal body weight (20-25 BMI) may lead to optimal breast cancer outcomes. Avoid alcohol. Repeat mammogram December 2023. Continue Arimidex 1 mg daily at the discretion of medical oncology team.  Ca+/VitD while on Arimidex. Continue follow up with Medical Oncology, Primary Care, and all specialties as directed  RTC 6 months and as needed      I spent a total of 24 minutes on the date of the service which included preparing to see the patient, face-to-face patient care, completing clinical documentation, obtaining and/or reviewing separately obtained history, performing a medically appropriate examination, counseling and educating the patient/family/caregiver, ordering medications, tests, or procedures, communicating with other HCPs (not separately reported), independently interpreting results (not separately reported), communicating results to the patient/family/caregiver and care coordination (not separately reported). This document is generated, in part, by voice recognition software and thus syntax and grammatical errors are possible.     Sydney Serrano (Ion Fisher) Camacho Oreilly, RN, MSN, APRN-CNP, [address]  Advanced Oncology Certified Nurse Practitioner  Department of Breast Surgery  [facility]/  Care in collaboration with Dr. Sandi Coronel.  Ashley/Dr. Dalton Levine/Dr. Kamla Davis APRN-CNP

## 2022-12-19 ENCOUNTER — HOSPITAL ENCOUNTER (OUTPATIENT)
Dept: GENERAL RADIOLOGY | Age: 77
Discharge: HOME | End: 2022-12-21
Payer: COMMERCIAL

## 2022-12-19 ENCOUNTER — OFFICE VISIT (OUTPATIENT)
Dept: BREAST CENTER | Age: 77
End: 2022-12-19
Payer: COMMERCIAL

## 2022-12-19 VITALS
DIASTOLIC BLOOD PRESSURE: 72 MMHG | RESPIRATION RATE: 18 BRPM | OXYGEN SATURATION: 100 % | TEMPERATURE: 97.2 F | BODY MASS INDEX: 31.91 KG/M2 | WEIGHT: 152 LBS | SYSTOLIC BLOOD PRESSURE: 158 MMHG | HEIGHT: 58 IN | HEART RATE: 84 BPM

## 2022-12-19 DIAGNOSIS — Z12.31 VISIT FOR SCREENING MAMMOGRAM: ICD-10-CM

## 2022-12-19 DIAGNOSIS — Z85.3 PERSONAL HISTORY OF BREAST CANCER: Primary | ICD-10-CM

## 2022-12-19 PROCEDURE — 1123F ACP DISCUSS/DSCN MKR DOCD: CPT | Performed by: NURSE PRACTITIONER

## 2022-12-19 PROCEDURE — 3078F DIAST BP <80 MM HG: CPT | Performed by: NURSE PRACTITIONER

## 2022-12-19 PROCEDURE — 77067 SCR MAMMO BI INCL CAD: CPT

## 2022-12-19 PROCEDURE — 99213 OFFICE O/P EST LOW 20 MIN: CPT | Performed by: NURSE PRACTITIONER

## 2022-12-19 PROCEDURE — 3074F SYST BP LT 130 MM HG: CPT | Performed by: NURSE PRACTITIONER

## 2023-02-07 ENCOUNTER — HOSPITAL ENCOUNTER (OUTPATIENT)
Dept: INFUSION THERAPY | Age: 78
Discharge: HOME | End: 2023-02-07

## 2023-02-07 ENCOUNTER — OFFICE VISIT (OUTPATIENT)
Dept: ONCOLOGY | Age: 78
End: 2023-02-07
Payer: COMMERCIAL

## 2023-02-07 VITALS
DIASTOLIC BLOOD PRESSURE: 69 MMHG | BODY MASS INDEX: 31.91 KG/M2 | SYSTOLIC BLOOD PRESSURE: 163 MMHG | RESPIRATION RATE: 16 BRPM | HEART RATE: 69 BPM | WEIGHT: 152 LBS | TEMPERATURE: 97.5 F | HEIGHT: 58 IN | OXYGEN SATURATION: 99 %

## 2023-02-07 DIAGNOSIS — D05.12 DUCTAL CARCINOMA IN SITU (DCIS) OF LEFT BREAST: ICD-10-CM

## 2023-02-07 DIAGNOSIS — Z78.0 POSTMENOPAUSAL: Primary | ICD-10-CM

## 2023-02-07 DIAGNOSIS — D64.9 ANEMIA, UNSPECIFIED TYPE: ICD-10-CM

## 2023-02-07 PROCEDURE — 99214 OFFICE O/P EST MOD 30 MIN: CPT | Performed by: INTERNAL MEDICINE

## 2023-02-07 PROCEDURE — 3074F SYST BP LT 130 MM HG: CPT | Performed by: INTERNAL MEDICINE

## 2023-02-07 PROCEDURE — 3078F DIAST BP <80 MM HG: CPT | Performed by: INTERNAL MEDICINE

## 2023-02-07 PROCEDURE — 1123F ACP DISCUSS/DSCN MKR DOCD: CPT | Performed by: INTERNAL MEDICINE

## 2023-02-07 PROCEDURE — 99213 OFFICE O/P EST LOW 20 MIN: CPT | Performed by: INTERNAL MEDICINE

## 2023-02-07 NOTE — PROGRESS NOTES
[address]  [facility] MED ONCOLOGY  [address] [id number]  Dept: Rohit Whalen: [phone]  Attending Progress note      Reason for Visit:   Left Breast DCIS. Referring Physician:  Prosper Hayes MD.    PCP:  Sarbjit Moran DO    History of Present Illness: The patient is a pleasant 68 y.o. lady, with a past medical history significant for hyperlipidemia, and hypertension, who had presented with an abnormal screening Mammogram:  MAMMOGRAM (12/11/2018): INDICATION:   Screening. HISTORY:   The patient has no personal history of cancer. The patient has the following family history of breast cancer:  maternal aunt. MAMMOGRAM VIEWS:   The following mammographic views where obtained: bilateral craniocaudal; bilateral craniocaudal with tomosynthesis; bilateral mediolateral oblique; and bilateral mediolateral oblique with tomosynthesis       TOMOSYNTHESIS:   Tomosynthesis (3 Dimensional Breast Imaging) was used on this examination to aid in evaluation. COMPARISON:   No prior imaging studies are available for comparison. CAD:   This exam was reviewed using the [employer] Computer Aided Detection (CAD)       TISSUE DENSITY:   The breasts are heterogeneously dense (Type 3 density). MAMMOGRAM FINDINGS:   In the right breast, no suspicious masses, areas of suspicious architectural distortion, suspicious calcifications, or additional suspicious findings are identified. Finding 1:   There are coarse heterogeneous and fine pleomorphic calcifications measuring 17 mm seen in the upper outer quadrant of the left breast.       IMPRESSION:   Calcifications in the left breast require additional evaluation. Spot magnification views with consideration for a stereotactic biopsy if needed.       =======================================   BI-RADS Category 0:   Incomplete: Need Additional Imaging Evaluation =======================================       RISK ASSESSMENT:   During this patient's visit, information obtained was used to generate a lifetime risk assessment using the Tyrer-Cuzick model (also called the BRENNON [International Breast Cancer Intervention Study] Breast Cancer Risk Evaluation Tool). - LIFETIME RISK -   Patient has a Tyrer Cuzick score of 7.9%   - BREAST TISSUE DENSITY -   Heterogeneously dense       -AVERAGE RISK (<15% )   Yearly screening mammograms starting at age 36 and older. Regardless of breast density, tomosynthesis is suggested. Monthly self-breast exams are recommended for women age 27 and older. NOTE:   Mammography is not 100% accurate in the detection of breast cancer. Accuracy decreases as mammographic density of the breast increases. A negative mammogram should not deter further evaluation (including biopsy) of suspicious physical findings. Recommendations are based on NCCN ([address]) and ACR [employer] of Radiology) guidelines. Thank you for sending your patient to this ACR and FDA approved facility. If there are any physician concerns regarding this report, a Radiologist can be reached by calling the following number [phone]. Follow up Protocol for the [facility]:   BI-RADS 1 or 2:  Center will send a reminder when next mammogram is due. BI-RADS 3:  Center will send a reminder for recommended short term follow up. BI-RADS 0, 4 or 5:  Center will contact patient to schedule all additional views and procedures. PATHOLOGY:  Diagnosis:  Left breast, core needle biopsy: Intermediate to high grade ductal  carcinoma in situ (papillary, cribriform, and comedo types)    Comment:    There is no definite evidence of invasive carcinoma on the  calponin and p63 immunostained sections. Detached fragments of carcinoma  without associated stroma are also present. Microcalcifications are  noted. Intradepartmental consultation is obtained. Breast Cancer Marker Studies:  Estrogen Receptors (ER):  Positive:         Percentage of cells positive:  >90%         Intensity:  Strong    Progesterone Receptors (CA):  Positive:         Percentage of cells positive:  90%         Intensity:  Moderate to strong     The patient underwent on 1/18/2019 a Left breast needle localized lumpectomy, pathology:  CANCER CASE SUMMARY  Procedure-excision  Specimen laterality-left  Size (extent) of DCIS: 1.3 x 1.2 x 1.2 cm  Histologic type-ductal carcinoma in situ  Nuclear grade-grade 3 (high)  Necrosis-present, central  Margins (part A)-anterior and lateral margins involved by DCIS; DCIS is 1  mm from posterior margin  Margins (part B)- uninvolved by DCIS   Distance from closest margin: 1 mm from superior margin  Regional lymph nodes-no lymph nodes submitted or found  TNM classification (AJCC eighth edition)-pTis  NX MX  Ancillary studies-see prior report [facility]  for ER/ CA results    She underwent on 3/26/2019 a left lumpectomy specimen, new posterior margin, additional superior margin, path was neg for residual DCIS. The patient completed adjuvant radiation therapy on 6/6/2019. She was started on Arimidex on 6/18/2019. The patient denies any side effects from the Arimidex, she has back stiffness, which is chronic, no bleeding, no new bony pain. Taking calcium and vitamin D. She has been taking care of of her friend who just had a surgery. Review of Systems;  CONSTITUTIONAL: No fever, chills. Good energy level. Weight is stable since last visit. ENMT: Eyes: No diplopia; Nose: No epistaxis. Mouth: No sore throat. RESPIRATORY: No hemoptysis, shortness of breath, cough. CARDIOVASCULAR: No chest pain, palpitations. GASTROINTESTINAL: No nausea/vomiting, abdominal pain, diarrhea/constipation. GENITOURINARY: No dysuria, urinary frequency, hematuria.   NEURO: No syncope, presyncope, headache. MSK: pos for chronic joints pain. 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       Family History:  Family History   Problem Relation Age of Onset    Parkinsonism Mother     Heart Attack Father     Cancer Father 80        leukemia    Cancer Maternal Aunt         Breast--60 or 76s    Cancer Maternal Grandfather         unknown    Cancer Paternal Cousin 16        leukemia       Medications:  Reviewed and reconciled. Social History:  Social History     Socioeconomic History    Marital status: Single     Spouse name: Not on file    Number of children: Not on file    Years of education: Not on file    Highest education level: Not on file   Occupational History    Not on file   Tobacco Use    Smoking status: Never    Smokeless tobacco: Never   Vaping Use    Vaping Use: Never used   Substance and Sexual Activity    Alcohol use:  Yes     Alcohol/week: 1.0 standard drink     Types: 1 Glasses of wine per week     Comment: occasionally    Drug use: No    Sexual activity: Not Currently   Other Topics Concern    Not on file   Social History Narrative    Da Benítez lives in [place] alone - retired from [employer]. Social Determinants of Health     Financial Resource Strain: Low Risk     Difficulty of Paying Living Expenses: Not hard at all   Food Insecurity: No Food Insecurity    Worried About Running Out of Food in the Last Year: Never true    Ran Out of Food in the Last Year: Never true   Transportation Needs: Not on file   Physical Activity: Not on file   Stress: Not on file   Social Connections: Not on file   Intimate Partner Violence: Not on file   Housing Stability: Not on file       Allergies: Allergies   Allergen Reactions    Poultry Meal Hives    Ampicillin     Valerian     Valine     Hydrocodone-Acetaminophen Rash    Molds & Smuts Other (See Comments)    Pcn [Penicillins] Rash     OB/GYN:  Age of menarche was 8. Age of menopause was 61. Last menstrual period was age 61. Patient denies hormonal therapy. Patient is     Physical Exam:  BP (!) 163/69 (Site: Right Upper Arm, Position: Sitting, Cuff Size: Large Adult) Comment: Dr. Bernadine Armendariz notified  Pulse 69   Temp 97.5 °F (36.4 °C) (Infrared)   Resp 16   Ht 4' 10\" (1.473 m)   Wt 152 lb (68.9 kg)   LMP  (LMP Unknown)   SpO2 99%   BMI 31.77 kg/m²     GENERAL: Alert, oriented x 3, not in acute distress. HEENT: PERRLA; EOMI. Oropharynx clear. NECK: Supple. No palpable cervical or supraclavicular lymphadenopathy. LUNGS: Good air entry bilaterally. No wheezing, crackles or rhonchi. BREASTS: Right breast exam is negative for any skin changes, no nipple discharge, no palpable masses, no palpable axillary lymphadenopathy. The left breast exam is negative for any nipple discharge, she has a scar from her lumpectomy, no palpable masses, no palpable left axillary lymphadenopathy. CARDIOVASCULAR: Regular rate. No murmurs, rubs or gallops. ABDOMEN: Soft. Non-tender, non-distended. Positive bowel sounds.   EXTREMITIES: Without clubbing, cyanosis, or edema. NEUROLOGIC: No focal deficits. ECOG PS 1    Impression/Plan:      The patient is a pleasant 68 y.o. lady, with a past medical history significant for hyperlipidemia, and hypertension, who had presented with an abnormal screening Mammogram, she was diagnosed with a left breast DCIS, she is s/p Left breast needle localized lumpectomy, the DCIS is high grade with central necrosis, final margins negative, final pathologic stage is pTis NX MX, satge 0 disease, ER positive > 90% [place] positive, 90%. I discussed with the patient her diagnosis, characteristics of her DCIS, risk of disease recurrence, the increased risk of having an invasive breast cancer in the ipsilateral and contralateral breast. The patient has an ER positive disease I recommended endocrine therapy with an aromatase inhibitor, we discussed the risks and benefits of Arimidex. I had discussed with the patient the side effects of Arimidex, including but not limited to mood changes, hot flashes, joints pain, osteoporosis. She completed adjuvant radiation therapy on 6/6/2019 . She was started on Arimidex on 6/18/2019, she is tolerating it well overall, continue Arimidex. The patient had a DEXA scan done on 6/15/2021, revealing normal bone density, compared to June 2020, bone density demonstrates interval increase in the lumbar spine but decreased in both hips. Continue calcium and vitamin D. I ordered a DEXA scan to be done in June of this year. I discussed with her the importance of healthy lifestyle, monthly breast self-examination, as well as routine screening mammograms, she had bilateral screening mammogram done on 12/19/2022, was negative for malignancy, will be due again for repeat screening mammogram on 12/17/2023. I reviewed with the patient surveillance guidelines. Continue with surveillance.     Mild anemia, normocytic, fit test was negative, a work-up was ordered, she does not have iron vitamin B12, folate deficiency. Radha test is negative, sh is normal, reticulocytes 1.2%, the results were reviewed with the patient, her hemoglobin has been fluctuating, it was 12 in August, today it is 11.3, hematocrit 34.5, we will continue to monitor her CBCD 12/20/2022,. RTC in 3 months. Thank you for allowing us to participate in the care of Mrs. Staley.     Annabel Knight MD   HEMATOLOGY/MEDICAL [address] [address] [place] MED ONCOLOGY  [address] [place] [id number]  Dept: [facility]: [phone]

## 2023-02-08 DIAGNOSIS — I10 ESSENTIAL HYPERTENSION: ICD-10-CM

## 2023-02-08 RX ORDER — CHLORTHALIDONE 25 MG/1
25 TABLET ORAL DAILY
Qty: 90 TABLET | Refills: 1 | Status: SHIPPED | OUTPATIENT
Start: 2023-02-08

## 2023-02-08 RX ORDER — LOSARTAN POTASSIUM 50 MG/1
50 TABLET ORAL DAILY
Qty: 90 TABLET | Refills: 1 | Status: SHIPPED | OUTPATIENT
Start: 2023-02-08

## 2023-02-08 RX ORDER — POTASSIUM CHLORIDE 20 MEQ/1
TABLET, EXTENDED RELEASE ORAL
Qty: 90 TABLET | Refills: 1 | Status: SHIPPED | OUTPATIENT
Start: 2023-02-08

## 2023-02-24 ENCOUNTER — HOSPITAL ENCOUNTER (OUTPATIENT)
Age: 78
Discharge: HOME | End: 2023-02-24
Payer: MEDICARE

## 2023-02-24 DIAGNOSIS — D64.9 ANEMIA, UNSPECIFIED TYPE: ICD-10-CM

## 2023-02-24 LAB
BASOPHILS ABSOLUTE: 0.06 E9/L (ref 0–0.2)
BASOPHILS RELATIVE PERCENT: 1.2 % (ref 0–2)
EOSINOPHILS ABSOLUTE: 0.11 E9/L (ref 0.05–0.5)
EOSINOPHILS RELATIVE PERCENT: 2.2 % (ref 0–6)
HCT VFR BLD CALC: 35.1 % (ref 34–48)
HEMOGLOBIN: 11.5 G/DL (ref 11.5–15.5)
IMMATURE GRANULOCYTES #: 0.01 E9/L
IMMATURE GRANULOCYTES %: 0.2 % (ref 0–5)
LYMPHOCYTES ABSOLUTE: 1.38 E9/L (ref 1.5–4)
LYMPHOCYTES RELATIVE PERCENT: 27.4 % (ref 20–42)
MCH RBC QN AUTO: 27.8 PG (ref 26–35)
MCHC RBC AUTO-ENTMCNC: 32.8 % (ref 32–34.5)
MCV RBC AUTO: 84.8 FL (ref 80–99.9)
MONOCYTES ABSOLUTE: 0.7 E9/L (ref 0.1–0.95)
MONOCYTES RELATIVE PERCENT: 13.9 % (ref 2–12)
NEUTROPHILS ABSOLUTE: 2.77 E9/L (ref 1.8–7.3)
NEUTROPHILS RELATIVE PERCENT: 55.1 % (ref 43–80)
PDW BLD-RTO: 15.6 FL (ref 11.5–15)
PLATELET # BLD: 192 E9/L (ref 130–450)
PMV BLD AUTO: 11.8 FL (ref 7–12)
RBC # BLD: 4.14 E12/L (ref 3.5–5.5)
WBC # BLD: 5 E9/L (ref 4.5–11.5)

## 2023-02-24 PROCEDURE — 36415 COLL VENOUS BLD VENIPUNCTURE: CPT

## 2023-02-24 PROCEDURE — 85025 COMPLETE CBC W/AUTO DIFF WBC: CPT

## 2023-04-20 ENCOUNTER — OFFICE VISIT (OUTPATIENT)
Dept: FAMILY MEDICINE CLINIC | Age: 78
End: 2023-04-20
Payer: MEDICARE

## 2023-04-20 VITALS
BODY MASS INDEX: 32.2 KG/M2 | WEIGHT: 153.4 LBS | SYSTOLIC BLOOD PRESSURE: 143 MMHG | TEMPERATURE: 97.7 F | HEIGHT: 58 IN | DIASTOLIC BLOOD PRESSURE: 63 MMHG | HEART RATE: 76 BPM

## 2023-04-20 DIAGNOSIS — I10 ESSENTIAL HYPERTENSION: Primary | ICD-10-CM

## 2023-04-20 DIAGNOSIS — E78.2 MIXED HYPERLIPIDEMIA: ICD-10-CM

## 2023-04-20 PROBLEM — H35.3130 BILATERAL NONEXUDATIVE AGE-RELATED MACULAR DEGENERATION: Status: ACTIVE | Noted: 2022-07-12

## 2023-04-20 PROBLEM — R68.89 SUSPECTED GLAUCOMA OF BOTH EYES: Status: ACTIVE | Noted: 2022-07-12

## 2023-04-20 PROCEDURE — 3074F SYST BP LT 130 MM HG: CPT | Performed by: FAMILY MEDICINE

## 2023-04-20 PROCEDURE — 3078F DIAST BP <80 MM HG: CPT | Performed by: FAMILY MEDICINE

## 2023-04-20 PROCEDURE — 1123F ACP DISCUSS/DSCN MKR DOCD: CPT | Performed by: FAMILY MEDICINE

## 2023-04-20 PROCEDURE — 99214 OFFICE O/P EST MOD 30 MIN: CPT | Performed by: FAMILY MEDICINE

## 2023-04-20 RX ORDER — LOSARTAN POTASSIUM 100 MG/1
100 TABLET ORAL DAILY
Qty: 30 TABLET | Refills: 1 | Status: SHIPPED | OUTPATIENT
Start: 2023-04-20

## 2023-04-20 SDOH — ECONOMIC STABILITY: INCOME INSECURITY: HOW HARD IS IT FOR YOU TO PAY FOR THE VERY BASICS LIKE FOOD, HOUSING, MEDICAL CARE, AND HEATING?: NOT HARD AT ALL

## 2023-04-20 SDOH — ECONOMIC STABILITY: FOOD INSECURITY: WITHIN THE PAST 12 MONTHS, YOU WORRIED THAT YOUR FOOD WOULD RUN OUT BEFORE YOU GOT MONEY TO BUY MORE.: NEVER TRUE

## 2023-04-20 SDOH — ECONOMIC STABILITY: HOUSING INSECURITY
IN THE LAST 12 MONTHS, WAS THERE A TIME WHEN YOU DID NOT HAVE A STEADY PLACE TO SLEEP OR SLEPT IN A SHELTER (INCLUDING NOW)?: NO

## 2023-04-20 SDOH — ECONOMIC STABILITY: FOOD INSECURITY: WITHIN THE PAST 12 MONTHS, THE FOOD YOU BOUGHT JUST DIDN'T LAST AND YOU DIDN'T HAVE MONEY TO GET MORE.: NEVER TRUE

## 2023-04-20 ASSESSMENT — PATIENT HEALTH QUESTIONNAIRE - PHQ9
SUM OF ALL RESPONSES TO PHQ QUESTIONS 1-9: 0
SUM OF ALL RESPONSES TO PHQ QUESTIONS 1-9: 0
SUM OF ALL RESPONSES TO PHQ9 QUESTIONS 1 & 2: 0
SUM OF ALL RESPONSES TO PHQ QUESTIONS 1-9: 0
2. FEELING DOWN, DEPRESSED OR HOPELESS: 0
1. LITTLE INTEREST OR PLEASURE IN DOING THINGS: 0
SUM OF ALL RESPONSES TO PHQ QUESTIONS 1-9: 0

## 2023-05-05 DIAGNOSIS — D05.12 DUCTAL CARCINOMA IN SITU (DCIS) OF LEFT BREAST: Primary | ICD-10-CM

## 2023-05-09 ENCOUNTER — HOSPITAL ENCOUNTER (OUTPATIENT)
Dept: INFUSION THERAPY | Age: 78
Discharge: HOME | End: 2023-05-09
Payer: MEDICARE

## 2023-05-09 ENCOUNTER — OFFICE VISIT (OUTPATIENT)
Dept: ONCOLOGY | Age: 78
End: 2023-05-09
Payer: MEDICARE

## 2023-05-09 VITALS
RESPIRATION RATE: 18 BRPM | DIASTOLIC BLOOD PRESSURE: 72 MMHG | TEMPERATURE: 97.4 F | BODY MASS INDEX: 31.98 KG/M2 | HEART RATE: 66 BPM | SYSTOLIC BLOOD PRESSURE: 173 MMHG | OXYGEN SATURATION: 100 % | WEIGHT: 153 LBS

## 2023-05-09 DIAGNOSIS — D05.12 DUCTAL CARCINOMA IN SITU (DCIS) OF LEFT BREAST: ICD-10-CM

## 2023-05-09 DIAGNOSIS — D64.9 ANEMIA, UNSPECIFIED TYPE: Primary | ICD-10-CM

## 2023-05-09 LAB
ALBUMIN SERPL-MCNC: 4 G/DL (ref 3.5–5.2)
ALP SERPL-CCNC: 109 U/L (ref 35–104)
ALT SERPL-CCNC: 11 U/L (ref 0–32)
ANION GAP SERPL CALCULATED.3IONS-SCNC: 10 MMOL/L (ref 7–16)
AST SERPL-CCNC: 22 U/L (ref 0–31)
BASOPHILS # BLD: 0.07 E9/L (ref 0–0.2)
BASOPHILS NFR BLD: 1.2 % (ref 0–2)
BILIRUB SERPL-MCNC: 0.4 MG/DL (ref 0–1.2)
BUN SERPL-MCNC: 20 MG/DL (ref 6–23)
CALCIUM SERPL-MCNC: 9.8 MG/DL (ref 8.6–10.2)
CHLORIDE SERPL-SCNC: 101 MMOL/L (ref 98–107)
CO2 SERPL-SCNC: 25 MMOL/L (ref 22–29)
CREAT SERPL-MCNC: 0.8 MG/DL (ref 0.5–1)
EOSINOPHIL # BLD: 0.16 E9/L (ref 0.05–0.5)
EOSINOPHIL NFR BLD: 2.8 % (ref 0–6)
ERYTHROCYTE [DISTWIDTH] IN BLOOD BY AUTOMATED COUNT: 14.7 FL (ref 11.5–15)
GLUCOSE SERPL-MCNC: 95 MG/DL (ref 74–99)
HCT VFR BLD AUTO: 34.4 % (ref 34–48)
HGB BLD-MCNC: 10.9 G/DL (ref 11.5–15.5)
IMM GRANULOCYTES # BLD: 0.02 E9/L
IMM GRANULOCYTES NFR BLD: 0.4 % (ref 0–5)
LYMPHOCYTES # BLD: 1.53 E9/L (ref 1.5–4)
LYMPHOCYTES NFR BLD: 27.2 % (ref 20–42)
MCH RBC QN AUTO: 27.5 PG (ref 26–35)
MCHC RBC AUTO-ENTMCNC: 31.7 % (ref 32–34.5)
MCV RBC AUTO: 86.9 FL (ref 80–99.9)
MONOCYTES # BLD: 0.7 E9/L (ref 0.1–0.95)
MONOCYTES NFR BLD: 12.5 % (ref 2–12)
NEUTROPHILS # BLD: 3.14 E9/L (ref 1.8–7.3)
NEUTS SEG NFR BLD: 55.9 % (ref 43–80)
PLATELET # BLD AUTO: 193 E9/L (ref 130–450)
PMV BLD AUTO: 12.1 FL (ref 7–12)
POTASSIUM SERPL-SCNC: 3.8 MMOL/L (ref 3.5–5)
PROT SERPL-MCNC: 7.7 G/DL (ref 6.4–8.3)
RBC # BLD AUTO: 3.96 E12/L (ref 3.5–5.5)
SODIUM SERPL-SCNC: 136 MMOL/L (ref 132–146)
WBC # BLD: 5.6 E9/L (ref 4.5–11.5)

## 2023-05-09 PROCEDURE — 36415 COLL VENOUS BLD VENIPUNCTURE: CPT

## 2023-05-09 PROCEDURE — 99214 OFFICE O/P EST MOD 30 MIN: CPT | Performed by: INTERNAL MEDICINE

## 2023-05-09 PROCEDURE — 99212 OFFICE O/P EST SF 10 MIN: CPT

## 2023-05-09 PROCEDURE — 1123F ACP DISCUSS/DSCN MKR DOCD: CPT | Performed by: INTERNAL MEDICINE

## 2023-05-09 PROCEDURE — 3078F DIAST BP <80 MM HG: CPT | Performed by: INTERNAL MEDICINE

## 2023-05-09 PROCEDURE — 3077F SYST BP >= 140 MM HG: CPT | Performed by: INTERNAL MEDICINE

## 2023-05-09 NOTE — PROGRESS NOTES
Patient did stop at check out window, ok'd to leave without AVS.  Patient has [address]
Microcalcifications are  noted. Intradepartmental consultation is obtained. Breast Cancer Marker Studies:  Estrogen Receptors (ER):  Positive:         Percentage of cells positive:  >90%         Intensity:  Strong    Progesterone Receptors (SD):  Positive:         Percentage of cells positive:  90%         Intensity:  Moderate to strong     The patient underwent on 1/18/2019 a Left breast needle localized lumpectomy, pathology:  CANCER CASE SUMMARY  Procedure-excision  Specimen laterality-left  Size (extent) of DCIS: 1.3 x 1.2 x 1.2 cm  Histologic type-ductal carcinoma in situ  Nuclear grade-grade 3 (high)  Necrosis-present, central  Margins (part A)-anterior and lateral margins involved by DCIS; DCIS is 1  mm from posterior margin  Margins (part B)- uninvolved by DCIS   Distance from closest margin: 1 mm from superior margin  Regional lymph nodes-no lymph nodes submitted or found  TNM classification (AJCC eighth edition)-pTis  NX MX  Ancillary studies-see prior report [facility]  for ER/ SD results    She underwent on 3/26/2019 a left lumpectomy specimen, new posterior margin, additional superior margin, path was neg for residual DCIS. The patient completed adjuvant radiation therapy on 6/6/2019. She was started on Arimidex on 6/18/2019. The patient denies any side effects from the Arimidex, she has back stiffness, which is chronic, no bleeding, no new bony pain. Taking calcium and vitamin D. She has been having elevated BP, following closely with PCP. Review of Systems;  CONSTITUTIONAL: No fever, chills. Good energy level. Weight is stable since last visit. ENMT: Eyes: No diplopia; Nose: No epistaxis. Mouth: No sore throat. RESPIRATORY: No hemoptysis, shortness of breath, cough. CARDIOVASCULAR: No chest pain, palpitations. GASTROINTESTINAL: No nausea/vomiting, abdominal pain, diarrhea/constipation. GENITOURINARY: No dysuria, urinary frequency, hematuria.   NEURO: No syncope, presyncope,

## 2023-05-16 SDOH — HEALTH STABILITY: PHYSICAL HEALTH
ON AVERAGE, HOW MANY DAYS PER WEEK DO YOU ENGAGE IN MODERATE TO STRENUOUS EXERCISE (LIKE A BRISK WALK)?: PATIENT DECLINED

## 2023-05-16 ASSESSMENT — PATIENT HEALTH QUESTIONNAIRE - PHQ9
SUM OF ALL RESPONSES TO PHQ QUESTIONS 1-9: 0
2. FEELING DOWN, DEPRESSED OR HOPELESS: 0
1. LITTLE INTEREST OR PLEASURE IN DOING THINGS: 0
SUM OF ALL RESPONSES TO PHQ QUESTIONS 1-9: 0
SUM OF ALL RESPONSES TO PHQ9 QUESTIONS 1 & 2: 0

## 2023-05-16 ASSESSMENT — LIFESTYLE VARIABLES
HOW OFTEN DO YOU HAVE SIX OR MORE DRINKS ON ONE OCCASION: 1
HOW MANY STANDARD DRINKS CONTAINING ALCOHOL DO YOU HAVE ON A TYPICAL DAY: 0
HOW OFTEN DO YOU HAVE A DRINK CONTAINING ALCOHOL: NEVER
HOW MANY STANDARD DRINKS CONTAINING ALCOHOL DO YOU HAVE ON A TYPICAL DAY: PATIENT DOES NOT DRINK
HOW OFTEN DO YOU HAVE A DRINK CONTAINING ALCOHOL: 1

## 2023-05-18 ENCOUNTER — TELEPHONE (OUTPATIENT)
Dept: INFUSION THERAPY | Age: 78
End: 2023-05-18

## 2023-05-18 ENCOUNTER — OFFICE VISIT (OUTPATIENT)
Dept: FAMILY MEDICINE CLINIC | Age: 78
End: 2023-05-18
Payer: MEDICARE

## 2023-05-18 VITALS
WEIGHT: 155.4 LBS | HEIGHT: 58 IN | TEMPERATURE: 97.6 F | DIASTOLIC BLOOD PRESSURE: 61 MMHG | HEART RATE: 71 BPM | SYSTOLIC BLOOD PRESSURE: 141 MMHG | OXYGEN SATURATION: 98 % | BODY MASS INDEX: 32.62 KG/M2

## 2023-05-18 DIAGNOSIS — Z00.00 MEDICARE ANNUAL WELLNESS VISIT, SUBSEQUENT: Primary | ICD-10-CM

## 2023-05-18 PROCEDURE — 1123F ACP DISCUSS/DSCN MKR DOCD: CPT | Performed by: FAMILY MEDICINE

## 2023-05-18 PROCEDURE — 3074F SYST BP LT 130 MM HG: CPT | Performed by: FAMILY MEDICINE

## 2023-05-18 PROCEDURE — G0439 PPPS, SUBSEQ VISIT: HCPCS | Performed by: FAMILY MEDICINE

## 2023-05-18 PROCEDURE — 3078F DIAST BP <80 MM HG: CPT | Performed by: FAMILY MEDICINE

## 2023-05-18 RX ORDER — LOSARTAN POTASSIUM 50 MG/1
TABLET ORAL
COMMUNITY
Start: 2023-05-11 | End: 2023-05-18 | Stop reason: DRUGHIGH

## 2023-05-24 DIAGNOSIS — E78.2 MIXED HYPERLIPIDEMIA: ICD-10-CM

## 2023-05-24 RX ORDER — ATORVASTATIN CALCIUM 40 MG/1
40 TABLET, FILM COATED ORAL DAILY
Qty: 90 TABLET | Refills: 1 | Status: SHIPPED | OUTPATIENT
Start: 2023-05-24

## 2023-06-19 ASSESSMENT — ENCOUNTER SYMPTOMS
COUGH: 0
BACK PAIN: 1
SHORTNESS OF BREATH: 0

## 2023-06-19 NOTE — PROGRESS NOTES
Subjective: This note was copied forward from the last encounter. Essential components for this patient record were reviewed and verified on this visit including:  recent hospitalizations, recent imaging, PMH, PSH, FH, SOC HX, Allergies, and Medications were reviewed and updated as appropriate. In addition, the assessment and plan were copied from prior office note and updated accordingly. Patient ID: Brannon Montes is a 66 y.o. female. SHOBHA Nicholson is a pleasant 66 y.o. lady, with a past medical history significant for hyperlipidemia, and hypertension, who had presented with an abnormal screening Mammogram:      12/11/2018 bilateral screening mammogram noted calcifications in the left upper outer quadrant measuring 17 mm.  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 Detached fragments of carcinoma without associated stroma are also present. Microcalcifications are noted. Intradepartmental consultation is obtained.     Breast Cancer Marker Studies:  Estrogen Receptors (ER): Positive:         Percentage of cells positive:  >90%         Intensity:  Strong    Progesterone Receptors (DE): Positive:         Percentage of cells positive:  90%         Intensity:  Moderate to strong     01/18/2019 Underwent a Left breast needle localized lumpectomy per Dr. Hi Files, pathology:  CANCER CASE SUMMARY  Procedure-excision  Specimen laterality-left  Size (extent) of DCIS: 1.3 x 1.2 x 1.2 cm  Histologic type-ductal carcinoma in situ  Nuclear grade-grade 3 (high)  Necrosis-present, central  Margins (part A)-anterior and lateral margins involved by DCIS; DCIS is 1  mm from posterior margin  Margins (part B)- uninvolved by DCIS   Distance from closest

## 2023-06-21 ENCOUNTER — OFFICE VISIT (OUTPATIENT)
Dept: BREAST CENTER | Age: 78
End: 2023-06-21
Payer: MEDICARE

## 2023-06-21 ENCOUNTER — HOSPITAL ENCOUNTER (OUTPATIENT)
Dept: GENERAL RADIOLOGY | Age: 78
Discharge: HOME | End: 2023-06-23
Payer: MEDICARE

## 2023-06-21 VITALS
WEIGHT: 156 LBS | RESPIRATION RATE: 14 BRPM | TEMPERATURE: 98.1 F | HEART RATE: 76 BPM | OXYGEN SATURATION: 100 % | SYSTOLIC BLOOD PRESSURE: 136 MMHG | DIASTOLIC BLOOD PRESSURE: 68 MMHG | HEIGHT: 58 IN | BODY MASS INDEX: 32.75 KG/M2

## 2023-06-21 DIAGNOSIS — Z78.0 POSTMENOPAUSAL: ICD-10-CM

## 2023-06-21 DIAGNOSIS — Z12.31 VISIT FOR SCREENING MAMMOGRAM: ICD-10-CM

## 2023-06-21 DIAGNOSIS — Z85.3 PERSONAL HISTORY OF BREAST CANCER: Primary | ICD-10-CM

## 2023-06-21 PROCEDURE — 99212 OFFICE O/P EST SF 10 MIN: CPT | Performed by: NURSE PRACTITIONER

## 2023-06-21 PROCEDURE — 3075F SYST BP GE 130 - 139MM HG: CPT | Performed by: NURSE PRACTITIONER

## 2023-06-21 PROCEDURE — 3078F DIAST BP <80 MM HG: CPT | Performed by: NURSE PRACTITIONER

## 2023-06-21 PROCEDURE — 77080 DXA BONE DENSITY AXIAL: CPT

## 2023-06-21 PROCEDURE — 99213 OFFICE O/P EST LOW 20 MIN: CPT | Performed by: NURSE PRACTITIONER

## 2023-06-21 PROCEDURE — 1123F ACP DISCUSS/DSCN MKR DOCD: CPT | Performed by: NURSE PRACTITIONER

## 2023-06-21 ASSESSMENT — ENCOUNTER SYMPTOMS
GASTROINTESTINAL NEGATIVE: 1
CHEST TIGHTNESS: 0

## 2023-07-07 ENCOUNTER — TELEPHONE (OUTPATIENT)
Dept: FAMILY MEDICINE CLINIC | Age: 78
End: 2023-07-07

## 2023-07-07 DIAGNOSIS — I10 ESSENTIAL HYPERTENSION: ICD-10-CM

## 2023-07-07 RX ORDER — LOSARTAN POTASSIUM 100 MG/1
100 TABLET ORAL DAILY
Qty: 90 TABLET | Refills: 0 | Status: SHIPPED | OUTPATIENT
Start: 2023-07-07

## 2023-07-24 DIAGNOSIS — D05.12 DUCTAL CARCINOMA IN SITU (DCIS) OF LEFT BREAST: ICD-10-CM

## 2023-07-24 RX ORDER — ANASTROZOLE 1 MG/1
1 TABLET ORAL DAILY
Qty: 90 TABLET | Refills: 2 | Status: SHIPPED | OUTPATIENT
Start: 2023-07-24

## 2023-08-03 DIAGNOSIS — I10 ESSENTIAL HYPERTENSION: ICD-10-CM

## 2023-08-04 RX ORDER — CHLORTHALIDONE 25 MG/1
25 TABLET ORAL DAILY
Qty: 90 TABLET | Refills: 1 | Status: SHIPPED | OUTPATIENT
Start: 2023-08-04

## 2023-08-04 NOTE — TELEPHONE ENCOUNTER
Last Appointment   5/18/2023  Next Appointment  Visit date not found    Return for Medicare Annual Wellness Visit in 1 year.

## 2023-08-24 DIAGNOSIS — D05.12 DUCTAL CARCINOMA IN SITU (DCIS) OF LEFT BREAST: Primary | ICD-10-CM

## 2023-08-29 ENCOUNTER — HOSPITAL ENCOUNTER (OUTPATIENT)
Dept: INFUSION THERAPY | Age: 78
Discharge: HOME | End: 2023-08-29
Payer: MEDICARE

## 2023-08-29 ENCOUNTER — OFFICE VISIT (OUTPATIENT)
Dept: ONCOLOGY | Age: 78
End: 2023-08-29
Payer: MEDICARE

## 2023-08-29 VITALS
OXYGEN SATURATION: 99 % | TEMPERATURE: 97.5 F | HEART RATE: 79 BPM | BODY MASS INDEX: 32.66 KG/M2 | HEIGHT: 58 IN | RESPIRATION RATE: 18 BRPM | DIASTOLIC BLOOD PRESSURE: 70 MMHG | WEIGHT: 155.6 LBS | SYSTOLIC BLOOD PRESSURE: 156 MMHG

## 2023-08-29 DIAGNOSIS — D05.12 DUCTAL CARCINOMA IN SITU (DCIS) OF LEFT BREAST: ICD-10-CM

## 2023-08-29 DIAGNOSIS — D64.9 ANEMIA, UNSPECIFIED TYPE: ICD-10-CM

## 2023-08-29 DIAGNOSIS — D05.12 DUCTAL CARCINOMA IN SITU (DCIS) OF LEFT BREAST: Primary | ICD-10-CM

## 2023-08-29 LAB
ALBUMIN SERPL-MCNC: 4.2 G/DL (ref 3.5–5.2)
ALP SERPL-CCNC: 98 U/L (ref 35–104)
ALT SERPL-CCNC: 11 U/L (ref 0–32)
ANION GAP SERPL CALCULATED.3IONS-SCNC: 9 MMOL/L (ref 7–16)
AST SERPL-CCNC: 21 U/L (ref 0–31)
BASOPHILS # BLD: 0.08 K/UL (ref 0–0.2)
BASOPHILS NFR BLD: 2 % (ref 0–2)
BILIRUB SERPL-MCNC: 0.4 MG/DL (ref 0–1.2)
BUN SERPL-MCNC: 18 MG/DL (ref 6–23)
CALCIUM SERPL-MCNC: 9.9 MG/DL (ref 8.6–10.2)
CHLORIDE SERPL-SCNC: 101 MMOL/L (ref 98–107)
CO2 SERPL-SCNC: 26 MMOL/L (ref 22–29)
CREAT SERPL-MCNC: 0.9 MG/DL (ref 0.5–1)
EOSINOPHIL # BLD: 0.11 K/UL (ref 0.05–0.5)
EOSINOPHILS RELATIVE PERCENT: 2 % (ref 0–6)
ERYTHROCYTE [DISTWIDTH] IN BLOOD BY AUTOMATED COUNT: 14.4 % (ref 11.5–15)
GFR SERPL CREATININE-BSD FRML MDRD: >60 ML/MIN/1.73M2
GLUCOSE SERPL-MCNC: 96 MG/DL (ref 74–99)
HCT VFR BLD AUTO: 35.1 % (ref 34–48)
HGB BLD-MCNC: 11.4 G/DL (ref 11.5–15.5)
IMM GRANULOCYTES # BLD AUTO: <0.03 K/UL (ref 0–0.58)
IMM GRANULOCYTES NFR BLD: 0 % (ref 0–5)
LYMPHOCYTES NFR BLD: 1.31 K/UL (ref 1.5–4)
LYMPHOCYTES RELATIVE PERCENT: 25 % (ref 20–42)
MCH RBC QN AUTO: 27.5 PG (ref 26–35)
MCHC RBC AUTO-ENTMCNC: 32.5 G/DL (ref 32–34.5)
MCV RBC AUTO: 84.6 FL (ref 80–99.9)
MONOCYTES NFR BLD: 0.7 K/UL (ref 0.1–0.95)
MONOCYTES NFR BLD: 13 % (ref 2–12)
NEUTROPHILS NFR BLD: 58 % (ref 43–80)
NEUTS SEG NFR BLD: 3.07 K/UL (ref 1.8–7.3)
PLATELET # BLD AUTO: 195 K/UL (ref 130–450)
PMV BLD AUTO: 12.4 FL (ref 7–12)
POTASSIUM SERPL-SCNC: 3.9 MMOL/L (ref 3.5–5)
PROT SERPL-MCNC: 7.8 G/DL (ref 6.4–8.3)
RBC # BLD AUTO: 4.15 M/UL (ref 3.5–5.5)
SODIUM SERPL-SCNC: 136 MMOL/L (ref 132–146)
WBC OTHER # BLD: 5.3 K/UL (ref 4.5–11.5)

## 2023-08-29 PROCEDURE — 36415 COLL VENOUS BLD VENIPUNCTURE: CPT

## 2023-08-29 PROCEDURE — 85025 COMPLETE CBC W/AUTO DIFF WBC: CPT

## 2023-08-29 PROCEDURE — 1123F ACP DISCUSS/DSCN MKR DOCD: CPT | Performed by: INTERNAL MEDICINE

## 2023-08-29 PROCEDURE — 80053 COMPREHEN METABOLIC PANEL: CPT

## 2023-08-29 PROCEDURE — 3078F DIAST BP <80 MM HG: CPT | Performed by: INTERNAL MEDICINE

## 2023-08-29 PROCEDURE — 99214 OFFICE O/P EST MOD 30 MIN: CPT | Performed by: INTERNAL MEDICINE

## 2023-08-29 PROCEDURE — 3074F SYST BP LT 130 MM HG: CPT | Performed by: INTERNAL MEDICINE

## 2023-08-29 NOTE — PROGRESS NOTES
[address] [facility] MED ONCOLOGY  [address] [facility] [address] [facility] [address]  Dept: [address] [place] [address] Avenue: [phone]  Attending Progress note      Reason for Visit:   Left Breast DCIS. Referring Physician:  Luna Nevarez MD.    PCP:  Fabian Jiménez DO    History of Present Illness: The patient is a pleasant 66 y.o. lady, with a past medical history significant for hyperlipidemia, and hypertension, who had presented with an abnormal screening Mammogram:  MAMMOGRAM (12/11/2018): INDICATION:   Screening. HISTORY:   The patient has no personal history of cancer. The patient has the following family history of breast cancer:  maternal aunt. MAMMOGRAM VIEWS:   The following mammographic views where obtained: bilateral craniocaudal; bilateral craniocaudal with tomosynthesis; bilateral mediolateral oblique; and bilateral mediolateral oblique with tomosynthesis       TOMOSYNTHESIS:   Tomosynthesis (3 Dimensional Breast Imaging) was used on this examination to aid in evaluation. COMPARISON:   No prior imaging studies are available for comparison. CAD:   This exam was reviewed using the [facility] Computer Aided Detection (CAD)       TISSUE DENSITY:   The breasts are heterogeneously dense (Type 3 density). MAMMOGRAM FINDINGS:   In the right breast, no suspicious masses, areas of suspicious architectural distortion, suspicious calcifications, or additional suspicious findings are identified. Finding 1:   There are coarse heterogeneous and fine pleomorphic calcifications measuring 17 mm seen in the upper outer quadrant of the left breast.       IMPRESSION:   Calcifications in the left breast require additional evaluation. Spot magnification views with consideration for a stereotactic biopsy if needed.       =======================================   BI-RADS Category 0:   Incomplete: Need Additional Imaging Evaluation

## 2023-08-30 NOTE — PROGRESS NOTES
Patient did stop at check out window, ok'd to leave without AVS.  Patient has [facility].

## 2023-10-05 DIAGNOSIS — I10 ESSENTIAL HYPERTENSION: ICD-10-CM

## 2023-10-05 RX ORDER — LOSARTAN POTASSIUM 100 MG/1
100 TABLET ORAL DAILY
Qty: 90 TABLET | Refills: 0 | Status: SHIPPED | OUTPATIENT
Start: 2023-10-05

## 2023-10-10 ENCOUNTER — OFFICE VISIT (OUTPATIENT)
Dept: FAMILY MEDICINE CLINIC | Age: 78
End: 2023-10-10
Payer: MEDICARE

## 2023-10-10 VITALS
DIASTOLIC BLOOD PRESSURE: 67 MMHG | SYSTOLIC BLOOD PRESSURE: 137 MMHG | OXYGEN SATURATION: 97 % | WEIGHT: 155 LBS | HEART RATE: 95 BPM | BODY MASS INDEX: 32.54 KG/M2 | RESPIRATION RATE: 16 BRPM | HEIGHT: 58 IN | TEMPERATURE: 97.4 F

## 2023-10-10 DIAGNOSIS — E78.2 MIXED HYPERLIPIDEMIA: ICD-10-CM

## 2023-10-10 DIAGNOSIS — I10 ESSENTIAL HYPERTENSION: ICD-10-CM

## 2023-10-10 DIAGNOSIS — Z23 NEEDS FLU SHOT: ICD-10-CM

## 2023-10-10 PROCEDURE — 3078F DIAST BP <80 MM HG: CPT

## 2023-10-10 PROCEDURE — 3074F SYST BP LT 130 MM HG: CPT

## 2023-10-10 PROCEDURE — 99213 OFFICE O/P EST LOW 20 MIN: CPT

## 2023-10-10 PROCEDURE — G0008 ADMIN INFLUENZA VIRUS VAC: HCPCS | Performed by: FAMILY MEDICINE

## 2023-10-10 PROCEDURE — 1123F ACP DISCUSS/DSCN MKR DOCD: CPT

## 2023-10-10 PROCEDURE — 90694 VACC AIIV4 NO PRSRV 0.5ML IM: CPT | Performed by: FAMILY MEDICINE

## 2023-10-10 ASSESSMENT — ENCOUNTER SYMPTOMS
SHORTNESS OF BREATH: 0
DIARRHEA: 0
ABDOMINAL PAIN: 0
VOMITING: 0
NAUSEA: 0
CONSTIPATION: 0

## 2023-10-12 ENCOUNTER — TELEPHONE (OUTPATIENT)
Dept: INFUSION THERAPY | Age: 78
End: 2023-10-12

## 2023-10-12 NOTE — TELEPHONE ENCOUNTER
Spoke with patient about RSV vaccination. Per Dr. Shauna tanner the vaccine is optional , the patients that received the RSV vaccine haven't had any side effects from it. Patient thanked the nurse and verbalized understanding.

## 2023-10-24 ENCOUNTER — HOSPITAL ENCOUNTER (OUTPATIENT)
Age: 78
Discharge: HOME | End: 2023-10-24
Payer: MEDICARE

## 2023-10-24 DIAGNOSIS — E78.2 MIXED HYPERLIPIDEMIA: ICD-10-CM

## 2023-10-24 LAB
CHOLEST SERPL-MCNC: 144 MG/DL
HDLC SERPL-MCNC: 73 MG/DL
LDLC SERPL CALC-MCNC: 54 MG/DL
TRIGL SERPL-MCNC: 84 MG/DL
VLDLC SERPL CALC-MCNC: 17 MG/DL

## 2023-10-24 PROCEDURE — 80061 LIPID PANEL: CPT

## 2023-11-22 DIAGNOSIS — E78.2 MIXED HYPERLIPIDEMIA: ICD-10-CM

## 2023-11-22 DIAGNOSIS — I10 ESSENTIAL HYPERTENSION: ICD-10-CM

## 2023-11-22 RX ORDER — ATORVASTATIN CALCIUM 40 MG/1
40 TABLET, FILM COATED ORAL DAILY
Qty: 90 TABLET | Refills: 1 | Status: SHIPPED | OUTPATIENT
Start: 2023-11-22

## 2023-11-22 NOTE — TELEPHONE ENCOUNTER
Last Appointment   10/10/2023  Next Appointment  Visit date not found    Return in about 6 months (around 4/10/2024) for HTN, HLD.

## 2023-11-24 RX ORDER — POTASSIUM CHLORIDE 20 MEQ/1
TABLET, EXTENDED RELEASE ORAL
Qty: 90 TABLET | Refills: 1 | Status: SHIPPED | OUTPATIENT
Start: 2023-11-24

## 2023-12-06 ENCOUNTER — HOSPITAL ENCOUNTER (OUTPATIENT)
Age: 78
Discharge: HOME | End: 2023-12-06
Payer: MEDICARE

## 2023-12-06 DIAGNOSIS — I10 ESSENTIAL HYPERTENSION: ICD-10-CM

## 2023-12-06 LAB
ANION GAP SERPL CALCULATED.3IONS-SCNC: 13 MMOL/L (ref 7–16)
BUN SERPL-MCNC: 22 MG/DL (ref 6–23)
CALCIUM SERPL-MCNC: 10.3 MG/DL (ref 8.6–10.2)
CHLORIDE SERPL-SCNC: 96 MMOL/L (ref 98–107)
CO2 SERPL-SCNC: 23 MMOL/L (ref 22–29)
CREAT SERPL-MCNC: 0.9 MG/DL (ref 0.5–1)
GFR SERPL CREATININE-BSD FRML MDRD: >60 ML/MIN/1.73M2
GLUCOSE SERPL-MCNC: 93 MG/DL (ref 74–99)
POTASSIUM SERPL-SCNC: 3.9 MMOL/L (ref 3.5–5)
SODIUM SERPL-SCNC: 132 MMOL/L (ref 132–146)

## 2023-12-06 PROCEDURE — 80048 BASIC METABOLIC PNL TOTAL CA: CPT

## 2023-12-13 DIAGNOSIS — I10 ESSENTIAL HYPERTENSION: ICD-10-CM

## 2023-12-13 RX ORDER — LOSARTAN POTASSIUM 100 MG/1
100 TABLET ORAL DAILY
Qty: 90 TABLET | Refills: 1 | Status: SHIPPED | OUTPATIENT
Start: 2023-12-13

## 2024-01-04 ENCOUNTER — OFFICE VISIT (OUTPATIENT)
Dept: BREAST CENTER | Age: 79
End: 2024-01-04
Payer: MEDICARE

## 2024-01-04 VITALS
HEART RATE: 68 BPM | WEIGHT: 153 LBS | RESPIRATION RATE: 16 BRPM | OXYGEN SATURATION: 98 % | TEMPERATURE: 98.8 F | HEIGHT: 58 IN | DIASTOLIC BLOOD PRESSURE: 72 MMHG | SYSTOLIC BLOOD PRESSURE: 134 MMHG | BODY MASS INDEX: 32.12 KG/M2

## 2024-01-04 DIAGNOSIS — Z85.3 PERSONAL HISTORY OF BREAST CANCER: Primary | ICD-10-CM

## 2024-01-04 PROCEDURE — 3078F DIAST BP <80 MM HG: CPT | Performed by: NURSE PRACTITIONER

## 2024-01-04 PROCEDURE — 1123F ACP DISCUSS/DSCN MKR DOCD: CPT | Performed by: NURSE PRACTITIONER

## 2024-01-04 PROCEDURE — 99213 OFFICE O/P EST LOW 20 MIN: CPT | Performed by: NURSE PRACTITIONER

## 2024-01-04 PROCEDURE — 3075F SYST BP GE 130 - 139MM HG: CPT | Performed by: NURSE PRACTITIONER

## 2024-01-04 ASSESSMENT — ENCOUNTER SYMPTOMS
BACK PAIN: 1
CHEST TIGHTNESS: 0
COUGH: 0
SHORTNESS OF BREATH: 0
GASTROINTESTINAL NEGATIVE: 1

## 2024-01-04 NOTE — PROGRESS NOTES
Subjective:    This note was copied forward from the last encounter.  Essential components for this patient record were reviewed and verified on this visit including:  recent hospitalizations, recent imaging, PMH, PSH, FH, SOC HX, Allergies, and Medications were reviewed and updated as appropriate.  In addition, the assessment and plan were copied from prior office note and updated accordingly.     Patient ID: Joan Staley is a 78 y.o. female.    SHOBHA Franco is a pleasant 78 y.o. lady, with a past medical history significant for hyperlipidemia, and hypertension, who had presented with an abnormal screening Mammogram:      12/11/2018 bilateral screening mammogram noted calcifications in the left upper outer quadrant measuring 17 mm.  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  Detached fragments of carcinoma without associated stroma are also present.  Microcalcifications are noted.  Intradepartmental consultation is obtained.    Breast Cancer Marker Studies:  Estrogen Receptors (ER): Positive:         Percentage of cells positive:  >90%         Intensity:  Strong    Progesterone Receptors (ID): Positive:         Percentage of cells positive:  90%         Intensity:  Moderate to strong     01/18/2019 Underwent a Left breast needle localized lumpectomy per Dr. Levine, pathology:  CANCER CASE SUMMARY  Procedure-excision  Specimen laterality-left  Size (extent) of DCIS: 1.3 x 1.2 x 1.2 cm  Histologic type-ductal carcinoma in situ  Nuclear grade-grade 3 (high)  Necrosis-present, central  Margins (part A)-anterior and lateral margins involved by DCIS; DCIS is 1  mm from posterior margin  Margins (part B)- uninvolved by DCIS   Distance from closest

## 2024-02-20 ENCOUNTER — HOSPITAL ENCOUNTER (OUTPATIENT)
Dept: INFUSION THERAPY | Age: 79
Discharge: HOME | End: 2024-02-20
Payer: MEDICARE

## 2024-02-20 ENCOUNTER — OFFICE VISIT (OUTPATIENT)
Dept: ONCOLOGY | Age: 79
End: 2024-02-20
Payer: MEDICARE

## 2024-02-20 VITALS
DIASTOLIC BLOOD PRESSURE: 64 MMHG | OXYGEN SATURATION: 100 % | HEART RATE: 84 BPM | BODY MASS INDEX: 33.29 KG/M2 | SYSTOLIC BLOOD PRESSURE: 146 MMHG | TEMPERATURE: 97.7 F | HEIGHT: 58 IN | WEIGHT: 158.6 LBS

## 2024-02-20 DIAGNOSIS — I10 ESSENTIAL HYPERTENSION: ICD-10-CM

## 2024-02-20 DIAGNOSIS — D05.12 DUCTAL CARCINOMA IN SITU (DCIS) OF LEFT BREAST: ICD-10-CM

## 2024-02-20 DIAGNOSIS — D64.9 ANEMIA, UNSPECIFIED TYPE: Primary | ICD-10-CM

## 2024-02-20 LAB
ALBUMIN SERPL-MCNC: 4.1 G/DL (ref 3.5–5.2)
ALP SERPL-CCNC: 106 U/L (ref 35–104)
ALT SERPL-CCNC: 10 U/L (ref 0–32)
ANION GAP SERPL CALCULATED.3IONS-SCNC: 10 MMOL/L (ref 7–16)
AST SERPL-CCNC: 17 U/L (ref 0–31)
BASOPHILS # BLD: 0.09 K/UL (ref 0–0.2)
BASOPHILS NFR BLD: 2 % (ref 0–2)
BILIRUB SERPL-MCNC: 0.3 MG/DL (ref 0–1.2)
BUN SERPL-MCNC: 20 MG/DL (ref 6–23)
CALCIUM SERPL-MCNC: 9.7 MG/DL (ref 8.6–10.2)
CHLORIDE SERPL-SCNC: 100 MMOL/L (ref 98–107)
CO2 SERPL-SCNC: 26 MMOL/L (ref 22–29)
CREAT SERPL-MCNC: 1 MG/DL (ref 0.5–1)
EOSINOPHIL # BLD: 0.18 K/UL (ref 0.05–0.5)
EOSINOPHILS RELATIVE PERCENT: 3 % (ref 0–6)
ERYTHROCYTE [DISTWIDTH] IN BLOOD BY AUTOMATED COUNT: 14.3 % (ref 11.5–15)
GFR SERPL CREATININE-BSD FRML MDRD: >60 ML/MIN/1.73M2
GLUCOSE SERPL-MCNC: 124 MG/DL (ref 74–99)
HCT VFR BLD AUTO: 34.7 % (ref 34–48)
HGB BLD-MCNC: 11.1 G/DL (ref 11.5–15.5)
IMM GRANULOCYTES # BLD AUTO: <0.03 K/UL (ref 0–0.58)
IMM GRANULOCYTES NFR BLD: 0 % (ref 0–5)
LYMPHOCYTES NFR BLD: 1.55 K/UL (ref 1.5–4)
LYMPHOCYTES RELATIVE PERCENT: 28 % (ref 20–42)
MCH RBC QN AUTO: 27.5 PG (ref 26–35)
MCHC RBC AUTO-ENTMCNC: 32 G/DL (ref 32–34.5)
MCV RBC AUTO: 85.9 FL (ref 80–99.9)
MONOCYTES NFR BLD: 0.72 K/UL (ref 0.1–0.95)
MONOCYTES NFR BLD: 13 % (ref 2–12)
NEUTROPHILS NFR BLD: 53 % (ref 43–80)
NEUTS SEG NFR BLD: 2.93 K/UL (ref 1.8–7.3)
PLATELET # BLD AUTO: 197 K/UL (ref 130–450)
PMV BLD AUTO: 11.6 FL (ref 7–12)
POTASSIUM SERPL-SCNC: 3.7 MMOL/L (ref 3.5–5)
PROT SERPL-MCNC: 7.7 G/DL (ref 6.4–8.3)
RBC # BLD AUTO: 4.04 M/UL (ref 3.5–5.5)
SODIUM SERPL-SCNC: 136 MMOL/L (ref 132–146)
WBC OTHER # BLD: 5.5 K/UL (ref 4.5–11.5)

## 2024-02-20 PROCEDURE — 3077F SYST BP >= 140 MM HG: CPT | Performed by: INTERNAL MEDICINE

## 2024-02-20 PROCEDURE — 3078F DIAST BP <80 MM HG: CPT | Performed by: INTERNAL MEDICINE

## 2024-02-20 PROCEDURE — 1123F ACP DISCUSS/DSCN MKR DOCD: CPT | Performed by: INTERNAL MEDICINE

## 2024-02-20 PROCEDURE — 99214 OFFICE O/P EST MOD 30 MIN: CPT | Performed by: INTERNAL MEDICINE

## 2024-02-20 PROCEDURE — 80053 COMPREHEN METABOLIC PANEL: CPT

## 2024-02-20 PROCEDURE — 36415 COLL VENOUS BLD VENIPUNCTURE: CPT

## 2024-02-20 PROCEDURE — 85025 COMPLETE CBC W/AUTO DIFF WBC: CPT

## 2024-02-20 RX ORDER — CHLORTHALIDONE 25 MG/1
25 TABLET ORAL DAILY
Qty: 90 TABLET | Refills: 1 | Status: SHIPPED | OUTPATIENT
Start: 2024-02-20

## 2024-02-20 RX ORDER — ANASTROZOLE 1 MG/1
1 TABLET ORAL DAILY
Qty: 90 TABLET | Refills: 2 | Status: SHIPPED | OUTPATIENT
Start: 2024-02-20

## 2024-02-23 NOTE — PROGRESS NOTES
Patient did stop at check out window, ok'd to leave without AVS.  Patient has [employer].    
mammogram done on 12/21/2023, which I had reviewed, was negative for malignancy.      I reviewed with the patient surveillance guidelines.  Continue with surveillance.    Mild anemia, normocytic, fit test was negative, a work-up was ordered, she does not have iron vitamin B12, folate deficiency.  Radha test is negative, sh is normal, reticulocytes 1.2%, the results were reviewed with the patient, her hemoglobin has been fluctuating, at this time she has minimal anemia, hemoglobin of 8.1G/L, hematocrit 34.7, normal white count and platelet count, will continue to monitor her counts.    RTC in 3 months.    Thank you for allowing us to participate in the care of Mrs. Staley.    HALI ESPAÑA MD   HEMATOLOGY/MEDICAL ONCOLOGY  [facility] MED ONCOLOGY  [address] [place] [address]  Dept: [phone]  Loc: [phone]

## 2024-04-11 ENCOUNTER — OFFICE VISIT (OUTPATIENT)
Dept: FAMILY MEDICINE CLINIC | Age: 79
End: 2024-04-11
Payer: MEDICARE

## 2024-04-11 VITALS
SYSTOLIC BLOOD PRESSURE: 138 MMHG | DIASTOLIC BLOOD PRESSURE: 74 MMHG | BODY MASS INDEX: 33.09 KG/M2 | OXYGEN SATURATION: 97 % | RESPIRATION RATE: 16 BRPM | HEIGHT: 58 IN | TEMPERATURE: 97.6 F | WEIGHT: 157.63 LBS | HEART RATE: 81 BPM

## 2024-04-11 DIAGNOSIS — E78.2 MIXED HYPERLIPIDEMIA: ICD-10-CM

## 2024-04-11 DIAGNOSIS — Z85.3 HISTORY OF BREAST CANCER: ICD-10-CM

## 2024-04-11 DIAGNOSIS — D64.9 NORMOCYTIC ANEMIA: ICD-10-CM

## 2024-04-11 DIAGNOSIS — J30.1 SEASONAL ALLERGIC RHINITIS DUE TO POLLEN: ICD-10-CM

## 2024-04-11 DIAGNOSIS — I10 ESSENTIAL HYPERTENSION: ICD-10-CM

## 2024-04-11 DIAGNOSIS — L21.9 SEBORRHEIC DERMATITIS: ICD-10-CM

## 2024-04-11 PROBLEM — H40.003 OPEN-ANGLE GLAUCOMA SUSPECT OF BOTH EYES: Status: ACTIVE | Noted: 2023-07-18

## 2024-04-11 PROBLEM — R68.89 SUSPECTED GLAUCOMA OF BOTH EYES: Status: RESOLVED | Noted: 2022-07-12 | Resolved: 2024-04-11

## 2024-04-11 PROCEDURE — 99213 OFFICE O/P EST LOW 20 MIN: CPT

## 2024-04-11 PROCEDURE — 1123F ACP DISCUSS/DSCN MKR DOCD: CPT

## 2024-04-11 PROCEDURE — 3078F DIAST BP <80 MM HG: CPT

## 2024-04-11 PROCEDURE — 3075F SYST BP GE 130 - 139MM HG: CPT

## 2024-04-11 RX ORDER — FLUTICASONE PROPIONATE 50 MCG
1 SPRAY, SUSPENSION (ML) NASAL DAILY
Qty: 32 G | Refills: 1 | Status: SHIPPED | OUTPATIENT
Start: 2024-04-11

## 2024-04-11 RX ORDER — POTASSIUM CHLORIDE 20 MEQ/1
20 TABLET, EXTENDED RELEASE ORAL DAILY
Qty: 90 TABLET | Refills: 1 | Status: SHIPPED | OUTPATIENT
Start: 2024-04-11

## 2024-04-11 ASSESSMENT — PATIENT HEALTH QUESTIONNAIRE - PHQ9
SUM OF ALL RESPONSES TO PHQ QUESTIONS 1-9: 0
SUM OF ALL RESPONSES TO PHQ QUESTIONS 1-9: 0
1. LITTLE INTEREST OR PLEASURE IN DOING THINGS: NOT AT ALL
SUM OF ALL RESPONSES TO PHQ9 QUESTIONS 1 & 2: 0
2. FEELING DOWN, DEPRESSED OR HOPELESS: NOT AT ALL
SUM OF ALL RESPONSES TO PHQ QUESTIONS 1-9: 0
SUM OF ALL RESPONSES TO PHQ QUESTIONS 1-9: 0

## 2024-04-11 NOTE — PROGRESS NOTES
S: 79 y.o. female with   Chief Complaint   Patient presents with    Hypertension       Allergies - wants to try nasal spray - previously had drowsiness due to antihistamine.     Will see derm soon    Htn controlled     O: VS:  height is 1.473 m (4' 9.99\") and weight is 71.5 kg (157 lb 10.1 oz). Her temporal temperature is 97.6 °F (36.4 °C). Her blood pressure is 138/74 and her pulse is 81. Her respiration is 16 and oxygen saturation is 97%.   BP Readings from Last 3 Encounters:   04/11/24 138/74   02/20/24 (!) 146/64   01/04/24 134/72     See resident note      Impression/Plan:   1) htn controlled   2) allergies - trial nasal steroid       Health Maintenance Due   Topic Date Due    Respiratory Syncytial Virus (RSV) Pregnant or age 60 yrs+ (1 - 1-dose 60+ series) Never done    Annual Wellness Visit (Medicare Advantage)  01/01/2024         Attending Physician Statement  I have discussed the case, including pertinent history and exam findings with the resident.  I agree with the documented assessment and plan.      Haris Mcclellan MD  
especially if left uncontrolled. Counseled regarding the possible side effects, risks, benefits and alternatives to treatment; patient and/or guardian verbalizes understanding, agrees, feels comfortable with and wishes to proceed with above treatment plan.    Call or go to ED immediately if symptoms worsen or persist. Advised patient to call with any new medication issues, and, as applicable, read all Rx info from pharmacy to assure aware of all possible risks and side effects of medication before taking.     Patient and/or guardian given opportunity to ask questions/raise concerns.The patient verbalized comfort and understanding of instructions.    I encourage further reading and education about your health conditions.Information on many health conditions is provided by the American Academy of Family Physicians: https://familydoctor.org/  Please bring any questions to me at your next visit.    Medication List:    Current Outpatient Medications   Medication Sig Dispense Refill    fluticasone (FLONASE) 50 MCG/ACT nasal spray 1 spray by Each Nostril route daily 32 g 1    potassium chloride (KLOR-CON M) 20 MEQ extended release tablet Take 1 tablet by mouth daily 90 tablet 1    anastrozole (ARIMIDEX) 1 MG tablet TAKE 1 TABLET BY MOUTH DAILY 90 tablet 2    chlorthalidone (HYGROTON) 25 MG tablet Take 1 tablet by mouth daily 90 tablet 1    losartan (COZAAR) 100 MG tablet TAKE 1 TABLET BY MOUTH DAILY 90 tablet 1    atorvastatin (LIPITOR) 40 MG tablet TAKE 1 TABLET BY MOUTH DAILY 90 tablet 1    Multiple Vitamins-Minerals (OCUVITE PRESERVISION PO) 1 TAB BID      calcium carbonate-vitamin D (CALCIUM 600 + D) 600-400 MG-UNIT TABS per tab Take 1 tablet by mouth 2 times daily 180 tablet 1    ibuprofen (ADVIL;MOTRIN) 200 MG CAPS Take 1 capsule by mouth as needed for Pain       No current facility-administered medications for this visit.      Return to Office: Return in about 6 months (around 10/11/2024) for AWV.      This document

## 2024-05-10 DIAGNOSIS — D05.12 DUCTAL CARCINOMA IN SITU (DCIS) OF LEFT BREAST: Primary | ICD-10-CM

## 2024-05-14 ENCOUNTER — OFFICE VISIT (OUTPATIENT)
Dept: ONCOLOGY | Age: 79
End: 2024-05-14
Payer: MEDICARE

## 2024-05-14 ENCOUNTER — HOSPITAL ENCOUNTER (OUTPATIENT)
Dept: INFUSION THERAPY | Age: 79
Discharge: HOME | End: 2024-05-14
Payer: MEDICARE

## 2024-05-14 VITALS
HEIGHT: 57 IN | TEMPERATURE: 97.4 F | WEIGHT: 160.4 LBS | BODY MASS INDEX: 34.61 KG/M2 | OXYGEN SATURATION: 100 % | HEART RATE: 66 BPM | DIASTOLIC BLOOD PRESSURE: 72 MMHG | SYSTOLIC BLOOD PRESSURE: 159 MMHG

## 2024-05-14 DIAGNOSIS — D64.9 ANEMIA, UNSPECIFIED TYPE: ICD-10-CM

## 2024-05-14 DIAGNOSIS — D05.12 DUCTAL CARCINOMA IN SITU (DCIS) OF LEFT BREAST: Primary | ICD-10-CM

## 2024-05-14 DIAGNOSIS — D05.12 DUCTAL CARCINOMA IN SITU (DCIS) OF LEFT BREAST: ICD-10-CM

## 2024-05-14 LAB
ALBUMIN SERPL-MCNC: 4 G/DL (ref 3.5–5.2)
ALP SERPL-CCNC: 96 U/L (ref 35–104)
ALT SERPL-CCNC: 11 U/L (ref 0–32)
ANION GAP SERPL CALCULATED.3IONS-SCNC: 10 MMOL/L (ref 7–16)
AST SERPL-CCNC: 20 U/L (ref 0–31)
BASOPHILS # BLD: 0.08 K/UL (ref 0–0.2)
BASOPHILS NFR BLD: 2 % (ref 0–2)
BILIRUB SERPL-MCNC: 0.3 MG/DL (ref 0–1.2)
BUN SERPL-MCNC: 23 MG/DL (ref 6–23)
CALCIUM SERPL-MCNC: 9.5 MG/DL (ref 8.6–10.2)
CHLORIDE SERPL-SCNC: 103 MMOL/L (ref 98–107)
CO2 SERPL-SCNC: 24 MMOL/L (ref 22–29)
CREAT SERPL-MCNC: 0.9 MG/DL (ref 0.5–1)
EOSINOPHIL # BLD: 0.18 K/UL (ref 0.05–0.5)
EOSINOPHILS RELATIVE PERCENT: 4 % (ref 0–6)
ERYTHROCYTE [DISTWIDTH] IN BLOOD BY AUTOMATED COUNT: 14.9 % (ref 11.5–15)
GFR, ESTIMATED: 65 ML/MIN/1.73M2
GLUCOSE SERPL-MCNC: 90 MG/DL (ref 74–99)
HCT VFR BLD AUTO: 32.4 % (ref 34–48)
HGB BLD-MCNC: 10.6 G/DL (ref 11.5–15.5)
IMM GRANULOCYTES # BLD AUTO: 0.04 K/UL (ref 0–0.58)
IMM GRANULOCYTES NFR BLD: 1 % (ref 0–5)
LYMPHOCYTES NFR BLD: 1.51 K/UL (ref 1.5–4)
LYMPHOCYTES RELATIVE PERCENT: 30 % (ref 20–42)
MCH RBC QN AUTO: 28.2 PG (ref 26–35)
MCHC RBC AUTO-ENTMCNC: 32.7 G/DL (ref 32–34.5)
MCV RBC AUTO: 86.2 FL (ref 80–99.9)
MONOCYTES NFR BLD: 0.81 K/UL (ref 0.1–0.95)
MONOCYTES NFR BLD: 16 % (ref 2–12)
NEUTROPHILS NFR BLD: 48 % (ref 43–80)
NEUTS SEG NFR BLD: 2.38 K/UL (ref 1.8–7.3)
PLATELET # BLD AUTO: 173 K/UL (ref 130–450)
PMV BLD AUTO: 11.8 FL (ref 7–12)
POTASSIUM SERPL-SCNC: 3.8 MMOL/L (ref 3.5–5)
PROT SERPL-MCNC: 7.5 G/DL (ref 6.4–8.3)
RBC # BLD AUTO: 3.76 M/UL (ref 3.5–5.5)
SODIUM SERPL-SCNC: 137 MMOL/L (ref 132–146)
WBC OTHER # BLD: 5 K/UL (ref 4.5–11.5)

## 2024-05-14 PROCEDURE — 3077F SYST BP >= 140 MM HG: CPT | Performed by: INTERNAL MEDICINE

## 2024-05-14 PROCEDURE — 85025 COMPLETE CBC W/AUTO DIFF WBC: CPT

## 2024-05-14 PROCEDURE — 36415 COLL VENOUS BLD VENIPUNCTURE: CPT

## 2024-05-14 PROCEDURE — 99214 OFFICE O/P EST MOD 30 MIN: CPT | Performed by: INTERNAL MEDICINE

## 2024-05-14 PROCEDURE — 1123F ACP DISCUSS/DSCN MKR DOCD: CPT | Performed by: INTERNAL MEDICINE

## 2024-05-14 PROCEDURE — 80053 COMPREHEN METABOLIC PANEL: CPT

## 2024-05-14 PROCEDURE — 3078F DIAST BP <80 MM HG: CPT | Performed by: INTERNAL MEDICINE

## 2024-05-14 NOTE — PROGRESS NOTES
[facility] PHYSICIANS [facility] CARE [facility] MED ONCOLOGY  [address]  [facility] [address]  Dept: [phone]  Loc: [phone]  Attending Progress note      Reason for Visit:   Left Breast DCIS.    Referring Physician:  Stacy Levine MD.    PCP:  Lissa Rodriguez DO    History of Present Illness:    The patient is a pleasant 79 y.o. lady, with a past medical history significant for hyperlipidemia, and hypertension, who had presented with an abnormal screening Mammogram:  MAMMOGRAM (12/11/2018):  INDICATION:   Screening.       HISTORY:   The patient has no personal history of cancer. The patient has the following family history of breast cancer:  maternal aunt.       MAMMOGRAM VIEWS:   The following mammographic views where obtained: bilateral craniocaudal; bilateral craniocaudal with tomosynthesis; bilateral mediolateral oblique; and bilateral mediolateral oblique with tomosynthesis       TOMOSYNTHESIS:   Tomosynthesis (3 Dimensional Breast Imaging) was used on this examination to aid in evaluation.       COMPARISON:   No prior imaging studies are available for comparison.       CAD:   This exam was reviewed using the [employer] Computer Aided Detection (CAD)       TISSUE DENSITY:   The breasts are heterogeneously dense (Type 3 density).       MAMMOGRAM FINDINGS:   In the right breast, no suspicious masses, areas of suspicious architectural distortion, suspicious calcifications, or additional suspicious findings are identified.           Finding 1:   There are coarse heterogeneous and fine pleomorphic calcifications measuring 17 mm seen in the upper outer quadrant of the left breast.       IMPRESSION:   Calcifications in the left breast require additional evaluation.   Spot magnification views with consideration for a stereotactic biopsy if needed.       =======================================   BI-RADS Category 0:  Incomplete: Need Additional Imaging Evaluation

## 2024-05-24 ENCOUNTER — HOSPITAL ENCOUNTER (EMERGENCY)
Age: 79
Discharge: HOME | End: 2024-05-24
Payer: MEDICARE

## 2024-05-24 VITALS
BODY MASS INDEX: 33.58 KG/M2 | HEIGHT: 58 IN | WEIGHT: 160 LBS | OXYGEN SATURATION: 100 % | DIASTOLIC BLOOD PRESSURE: 64 MMHG | SYSTOLIC BLOOD PRESSURE: 157 MMHG | TEMPERATURE: 98.8 F | HEART RATE: 81 BPM | RESPIRATION RATE: 18 BRPM
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
## 2024-05-24 DIAGNOSIS — S60.561A INSECT BITE OF RIGHT HAND, INITIAL ENCOUNTER: Primary | ICD-10-CM

## 2024-05-24 PROCEDURE — 99284 EMERGENCY DEPT VISIT MOD MDM: CPT

## 2024-05-24 PROCEDURE — 96372 THER/PROPH/DIAG INJ SC/IM: CPT

## 2024-05-24 PROCEDURE — 6360000002 HC RX W HCPCS: Performed by: PHYSICIAN ASSISTANT

## 2024-05-24 RX ORDER — TRIAMCINOLONE ACETONIDE 5 MG/G
CREAM TOPICAL
Qty: 60 G | Refills: 1 | Status: SHIPPED | OUTPATIENT
Start: 2024-05-24 | End: 2024-05-31

## 2024-05-24 RX ORDER — DEXAMETHASONE SODIUM PHOSPHATE 10 MG/ML
10 INJECTION INTRAMUSCULAR; INTRAVENOUS ONCE
Status: COMPLETED | OUTPATIENT
Start: 2024-05-24 | End: 2024-05-24

## 2024-05-24 RX ORDER — METHYLPREDNISOLONE 4 MG/1
TABLET ORAL
Qty: 1 KIT | Refills: 0 | Status: SHIPPED | OUTPATIENT
Start: 2024-05-24

## 2024-05-24 RX ADMIN — DEXAMETHASONE SODIUM PHOSPHATE 10 MG: 10 INJECTION INTRAMUSCULAR; INTRAVENOUS at 14:28

## 2024-05-24 ASSESSMENT — LIFESTYLE VARIABLES
HOW OFTEN DO YOU HAVE A DRINK CONTAINING ALCOHOL: NEVER
HOW MANY STANDARD DRINKS CONTAINING ALCOHOL DO YOU HAVE ON A TYPICAL DAY: PATIENT DOES NOT DRINK

## 2024-05-24 ASSESSMENT — PAIN - FUNCTIONAL ASSESSMENT: PAIN_FUNCTIONAL_ASSESSMENT: NONE - DENIES PAIN

## 2024-05-24 NOTE — ED PROVIDER NOTES
DISCONTINUED MEDICATIONS:  Discharge Medication List as of 5/24/2024  3:03 PM        DIAGNOSIS:     1. Insect bite of right hand, initial encounter      This patient's ED course included: a personal history and physicial examination  This patient has remained hemodynamically stable during their ED course.    DISPOSITION:   Discharge to home.  Patient condition is good.    Discharge Instructions:   Patient referred to  Lissa Rodriguez DO  [address] [id number]  [phone]    Call in 1 day  for follow-up on ED visit      Electronically signed by Zo Quintana PA-C   DD: 5/24/24  I am the Primary Clinician of Record.    **This report was transcribed using voice recognition software. Every effort was made to ensure accuracy; however, inadvertent computerized transcription errors may be present.    END OF PROVIDER NOTE        Zo Quintana PA-C  05/24/24 [id number]

## 2024-05-30 DIAGNOSIS — I10 ESSENTIAL HYPERTENSION: ICD-10-CM

## 2024-05-30 DIAGNOSIS — E78.2 MIXED HYPERLIPIDEMIA: ICD-10-CM

## 2024-05-30 RX ORDER — ATORVASTATIN CALCIUM 40 MG/1
40 TABLET, FILM COATED ORAL DAILY
Qty: 90 TABLET | Refills: 1 | Status: SHIPPED | OUTPATIENT
Start: 2024-05-30

## 2024-05-30 RX ORDER — LOSARTAN POTASSIUM 100 MG/1
100 TABLET ORAL DAILY
Qty: 90 TABLET | Refills: 1 | Status: SHIPPED | OUTPATIENT
Start: 2024-05-30

## 2024-06-03 ENCOUNTER — HOSPITAL ENCOUNTER (OUTPATIENT)
Age: 79
Discharge: HOME | End: 2024-06-03
Payer: MEDICARE

## 2024-06-03 DIAGNOSIS — D64.9 ANEMIA, UNSPECIFIED TYPE: ICD-10-CM

## 2024-06-03 LAB
BASOPHILS # BLD: 0.06 K/UL (ref 0–0.2)
BASOPHILS NFR BLD: 1 % (ref 0–2)
EOSINOPHIL # BLD: 0.17 K/UL (ref 0.05–0.5)
EOSINOPHILS RELATIVE PERCENT: 3 % (ref 0–6)
ERYTHROCYTE [DISTWIDTH] IN BLOOD BY AUTOMATED COUNT: 15.4 % (ref 11.5–15)
FERRITIN SERPL-MCNC: 251 NG/ML
HCT VFR BLD AUTO: 35 % (ref 34–48)
HGB BLD-MCNC: 11.2 G/DL (ref 11.5–15.5)
IMM GRANULOCYTES # BLD AUTO: 0.04 K/UL (ref 0–0.58)
IMM GRANULOCYTES NFR BLD: 1 % (ref 0–5)
IRON SATN MFR SERPL: 26 % (ref 15–50)
IRON SERPL-MCNC: 64 UG/DL (ref 37–145)
LYMPHOCYTES NFR BLD: 1.82 K/UL (ref 1.5–4)
LYMPHOCYTES RELATIVE PERCENT: 29 % (ref 20–42)
MCH RBC QN AUTO: 27.9 PG (ref 26–35)
MCHC RBC AUTO-ENTMCNC: 32 G/DL (ref 32–34.5)
MCV RBC AUTO: 87.1 FL (ref 80–99.9)
MONOCYTES NFR BLD: 0.82 K/UL (ref 0.1–0.95)
MONOCYTES NFR BLD: 13 % (ref 2–12)
NEUTROPHILS NFR BLD: 54 % (ref 43–80)
NEUTS SEG NFR BLD: 3.42 K/UL (ref 1.8–7.3)
PLATELET # BLD AUTO: 219 K/UL (ref 130–450)
PMV BLD AUTO: 10.7 FL (ref 7–12)
RBC # BLD AUTO: 4.02 M/UL (ref 3.5–5.5)
TIBC SERPL-MCNC: 246 UG/DL (ref 250–450)
WBC OTHER # BLD: 6.3 K/UL (ref 4.5–11.5)

## 2024-06-03 PROCEDURE — 82728 ASSAY OF FERRITIN: CPT

## 2024-06-03 PROCEDURE — 36415 COLL VENOUS BLD VENIPUNCTURE: CPT

## 2024-06-03 PROCEDURE — 85025 COMPLETE CBC W/AUTO DIFF WBC: CPT

## 2024-06-03 PROCEDURE — 83550 IRON BINDING TEST: CPT

## 2024-06-03 PROCEDURE — 83540 ASSAY OF IRON: CPT

## 2024-06-04 DIAGNOSIS — D64.9 ANEMIA, UNSPECIFIED TYPE: ICD-10-CM

## 2024-06-04 LAB
CONTROL: PRESENT
FECAL BLOOD IMMUNOCHEMICAL TEST: NEGATIVE

## 2024-07-31 DIAGNOSIS — I10 ESSENTIAL HYPERTENSION: ICD-10-CM

## 2024-07-31 RX ORDER — POTASSIUM CHLORIDE 20 MEQ/1
20 TABLET, EXTENDED RELEASE ORAL DAILY
Qty: 90 TABLET | Refills: 1 | Status: SHIPPED | OUTPATIENT
Start: 2024-07-31

## 2024-08-01 RX ORDER — CHLORTHALIDONE 25 MG/1
25 TABLET ORAL DAILY
Qty: 90 TABLET | Refills: 1 | Status: SHIPPED | OUTPATIENT
Start: 2024-08-01

## 2024-09-12 ENCOUNTER — TELEPHONE (OUTPATIENT)
Dept: FAMILY MEDICINE CLINIC | Age: 79
End: 2024-09-12

## 2024-09-16 DIAGNOSIS — D64.9 ANEMIA, UNSPECIFIED TYPE: ICD-10-CM

## 2024-09-16 DIAGNOSIS — D05.12 DUCTAL CARCINOMA IN SITU (DCIS) OF LEFT BREAST: Primary | ICD-10-CM

## 2024-09-17 ENCOUNTER — HOSPITAL ENCOUNTER (OUTPATIENT)
Dept: INFUSION THERAPY | Age: 79
Discharge: HOME | End: 2024-09-17
Payer: MEDICARE

## 2024-09-17 ENCOUNTER — OFFICE VISIT (OUTPATIENT)
Dept: ONCOLOGY | Age: 79
End: 2024-09-17
Payer: MEDICARE

## 2024-09-17 VITALS
HEIGHT: 58 IN | HEART RATE: 97 BPM | BODY MASS INDEX: 32.95 KG/M2 | TEMPERATURE: 98 F | OXYGEN SATURATION: 100 % | WEIGHT: 157 LBS | DIASTOLIC BLOOD PRESSURE: 70 MMHG | SYSTOLIC BLOOD PRESSURE: 162 MMHG

## 2024-09-17 DIAGNOSIS — D05.12 DUCTAL CARCINOMA IN SITU (DCIS) OF LEFT BREAST: ICD-10-CM

## 2024-09-17 DIAGNOSIS — D64.9 ANEMIA, UNSPECIFIED TYPE: ICD-10-CM

## 2024-09-17 DIAGNOSIS — D05.12 DUCTAL CARCINOMA IN SITU (DCIS) OF LEFT BREAST: Primary | ICD-10-CM

## 2024-09-17 LAB
ALBUMIN SERPL-MCNC: 4.1 G/DL (ref 3.5–5.2)
ALP SERPL-CCNC: 105 U/L (ref 35–104)
ALT SERPL-CCNC: 12 U/L (ref 0–32)
ANION GAP SERPL CALCULATED.3IONS-SCNC: 10 MMOL/L (ref 7–16)
AST SERPL-CCNC: 21 U/L (ref 0–31)
BASOPHILS # BLD: 0.07 K/UL (ref 0–0.2)
BASOPHILS NFR BLD: 1 % (ref 0–2)
BILIRUB SERPL-MCNC: 0.6 MG/DL (ref 0–1.2)
BUN SERPL-MCNC: 20 MG/DL (ref 6–23)
CALCIUM SERPL-MCNC: 10 MG/DL (ref 8.6–10.2)
CHLORIDE SERPL-SCNC: 99 MMOL/L (ref 98–107)
CO2 SERPL-SCNC: 27 MMOL/L (ref 22–29)
CREAT SERPL-MCNC: 1 MG/DL (ref 0.5–1)
EOSINOPHIL # BLD: 0.13 K/UL (ref 0.05–0.5)
EOSINOPHILS RELATIVE PERCENT: 2 % (ref 0–6)
ERYTHROCYTE [DISTWIDTH] IN BLOOD BY AUTOMATED COUNT: 14.2 % (ref 11.5–15)
FERRITIN SERPL-MCNC: 377 NG/ML
GFR, ESTIMATED: 59 ML/MIN/1.73M2
GLUCOSE SERPL-MCNC: 99 MG/DL (ref 74–99)
HCT VFR BLD AUTO: 36 % (ref 34–48)
HGB BLD-MCNC: 11.4 G/DL (ref 11.5–15.5)
IMM GRANULOCYTES # BLD AUTO: 0.05 K/UL (ref 0–0.58)
IMM GRANULOCYTES NFR BLD: 1 % (ref 0–5)
IRON SATN MFR SERPL: 34 % (ref 15–50)
IRON SERPL-MCNC: 77 UG/DL (ref 37–145)
LYMPHOCYTES NFR BLD: 1.31 K/UL (ref 1.5–4)
LYMPHOCYTES RELATIVE PERCENT: 24 % (ref 20–42)
MCH RBC QN AUTO: 26.9 PG (ref 26–35)
MCHC RBC AUTO-ENTMCNC: 31.7 G/DL (ref 32–34.5)
MCV RBC AUTO: 84.9 FL (ref 80–99.9)
MONOCYTES NFR BLD: 0.75 K/UL (ref 0.1–0.95)
MONOCYTES NFR BLD: 14 % (ref 2–12)
NEUTROPHILS NFR BLD: 58 % (ref 43–80)
NEUTS SEG NFR BLD: 3.24 K/UL (ref 1.8–7.3)
PLATELET # BLD AUTO: 247 K/UL (ref 130–450)
PMV BLD AUTO: 11.6 FL (ref 7–12)
POTASSIUM SERPL-SCNC: 4.1 MMOL/L (ref 3.5–5)
PROT SERPL-MCNC: 7.8 G/DL (ref 6.4–8.3)
RBC # BLD AUTO: 4.24 M/UL (ref 3.5–5.5)
SODIUM SERPL-SCNC: 136 MMOL/L (ref 132–146)
TIBC SERPL-MCNC: 227 UG/DL (ref 250–450)
WBC OTHER # BLD: 5.6 K/UL (ref 4.5–11.5)

## 2024-09-17 PROCEDURE — 3078F DIAST BP <80 MM HG: CPT | Performed by: INTERNAL MEDICINE

## 2024-09-17 PROCEDURE — 99214 OFFICE O/P EST MOD 30 MIN: CPT | Performed by: INTERNAL MEDICINE

## 2024-09-17 PROCEDURE — 83540 ASSAY OF IRON: CPT

## 2024-09-17 PROCEDURE — 85025 COMPLETE CBC W/AUTO DIFF WBC: CPT

## 2024-09-17 PROCEDURE — 83550 IRON BINDING TEST: CPT

## 2024-09-17 PROCEDURE — 36415 COLL VENOUS BLD VENIPUNCTURE: CPT

## 2024-09-17 PROCEDURE — 82728 ASSAY OF FERRITIN: CPT

## 2024-09-17 PROCEDURE — 80053 COMPREHEN METABOLIC PANEL: CPT

## 2024-09-17 PROCEDURE — 1123F ACP DISCUSS/DSCN MKR DOCD: CPT | Performed by: INTERNAL MEDICINE

## 2024-09-17 PROCEDURE — 3077F SYST BP >= 140 MM HG: CPT | Performed by: INTERNAL MEDICINE

## 2024-09-30 ENCOUNTER — OFFICE VISIT (OUTPATIENT)
Dept: FAMILY MEDICINE CLINIC | Age: 79
End: 2024-09-30
Payer: MEDICARE

## 2024-09-30 VITALS
BODY MASS INDEX: 33.17 KG/M2 | TEMPERATURE: 97.6 F | WEIGHT: 158 LBS | OXYGEN SATURATION: 98 % | HEIGHT: 58 IN | HEART RATE: 79 BPM | RESPIRATION RATE: 16 BRPM | DIASTOLIC BLOOD PRESSURE: 66 MMHG | SYSTOLIC BLOOD PRESSURE: 137 MMHG

## 2024-09-30 DIAGNOSIS — I10 ESSENTIAL HYPERTENSION: Primary | ICD-10-CM

## 2024-09-30 DIAGNOSIS — J30.2 SEASONAL ALLERGIES: ICD-10-CM

## 2024-09-30 DIAGNOSIS — E87.6 HYPOKALEMIA: ICD-10-CM

## 2024-09-30 PROBLEM — L82.1 SEBORRHEIC KERATOSES: Status: ACTIVE | Noted: 2024-09-30

## 2024-09-30 PROBLEM — D05.12 DUCTAL CARCINOMA IN SITU (DCIS) OF LEFT BREAST: Status: RESOLVED | Noted: 2019-01-10 | Resolved: 2024-09-30

## 2024-09-30 PROCEDURE — 99213 OFFICE O/P EST LOW 20 MIN: CPT

## 2024-09-30 PROCEDURE — 3075F SYST BP GE 130 - 139MM HG: CPT

## 2024-09-30 PROCEDURE — 1123F ACP DISCUSS/DSCN MKR DOCD: CPT

## 2024-09-30 PROCEDURE — 3078F DIAST BP <80 MM HG: CPT

## 2024-09-30 PROCEDURE — 90653 IIV ADJUVANT VACCINE IM: CPT | Performed by: FAMILY MEDICINE

## 2024-09-30 PROCEDURE — G0008 ADMIN INFLUENZA VIRUS VAC: HCPCS | Performed by: FAMILY MEDICINE

## 2024-09-30 RX ORDER — CETIRIZINE HYDROCHLORIDE 5 MG/1
5 TABLET ORAL DAILY
COMMUNITY

## 2024-09-30 SDOH — ECONOMIC STABILITY: FOOD INSECURITY: WITHIN THE PAST 12 MONTHS, YOU WORRIED THAT YOUR FOOD WOULD RUN OUT BEFORE YOU GOT MONEY TO BUY MORE.: NEVER TRUE

## 2024-09-30 SDOH — ECONOMIC STABILITY: FOOD INSECURITY: WITHIN THE PAST 12 MONTHS, THE FOOD YOU BOUGHT JUST DIDN'T LAST AND YOU DIDN'T HAVE MONEY TO GET MORE.: NEVER TRUE

## 2024-09-30 SDOH — ECONOMIC STABILITY: INCOME INSECURITY: HOW HARD IS IT FOR YOU TO PAY FOR THE VERY BASICS LIKE FOOD, HOUSING, MEDICAL CARE, AND HEATING?: NOT HARD AT ALL

## 2024-09-30 NOTE — PROGRESS NOTES
[facility]    CC: Joan Staley is a 79 y.o. yo female is here for evaluation evaluation for the following chronic medical concerns: Hypertension      HPI:    PMH L breast ductal carcinoma s/p lumpectomy ER+, HTN, HLD, normocytic anemia presented to the clinic for follow up    Had covid after labor day, recovered since.      Seasonal allergies- Zyrtec 5 mg daily, controlled     L breast ductal carcinoma insitu s/p lumpectomy, on adjuvant endocrine therapy, follows with [facility], bilateral screening mammogram in 1 year, on Arimidex 5 years in June 2024, normal dexa 6/2023, on calcium vit D; follows with oncology in three months; repeat mammo due 12/2024     HTN- losartan 100 mg, chlorthalidone 25 mg , asymptomatic denies CP, SOB, headache, vision changes, lightheadedness    Hx hypokalemia- KCl 20 meq daily     HLD- Lipitor 40 mg, WNL 2023     Normocytic anemia- per heme/onc, mild anemia, normocytic, fit test was negative, a work-up was ordered, she does not have iron vitamin B12, folate deficiency, Radha test is negative, iron studies reviewed, no evidence of iron deficiency, monitoring     Follows with dermatology, seborrheic keratoses on face; optho for cataracts    HCM:  DEXA scan done on 6/2023 WNL    Retired from [employer], stage makeup at [employer]    ROS negative unless otherwise noted    Vitals:   /66   Pulse 79   Temp 97.6 °F (36.4 °C) (Temporal)   Resp 16   Ht 1.473 m (4' 9.99\")   Wt 71.7 kg (158 lb)   LMP  (LMP Unknown)   SpO2 98%   BMI 33.03 kg/m²   Wt Readings from Last 3 Encounters:   09/30/24 71.7 kg (158 lb)   09/17/24 71.2 kg (157 lb)   05/24/24 72.6 kg (160 lb)       PE:  Physical Exam  Constitutional:       General: She is not in acute distress.  HENT:      Head: Normocephalic and atraumatic.   Eyes:      Extraocular Movements: Extraocular movements intact.   Cardiovascular:      Rate and Rhythm: Normal rate and regular rhythm.      Heart sounds: No

## 2024-09-30 NOTE — PROGRESS NOTES
[facility]  Precepting Note    Subjective:  Left breast Carcinoma In Situ:   Follows with Hem/Onc    - does have normocytic anemia as well.     HTN:   Well managed   No ss/sx  Losartan/Chlorthalidone   Labs reviewed     HM:   Flu   AWV due     ROS otherwise negative    Past medical, surgical, family and social history were reviewed, non-contributory, and unchanged unless otherwise stated.    Objective:    /66   Pulse 79   Temp 97.6 °F (36.4 °C) (Temporal)   Resp 16   Ht 1.473 m (4' 9.99\")   Wt 71.7 kg (158 lb)   LMP  (LMP Unknown)   SpO2 98%   BMI 33.03 kg/m²     Exam is as noted by resident.    Assessment/Plan:    HTN:   Continue current medication therapy.      Attending Physician Statement  I have reviewed the chart, including any radiology or labs, with the resident(s). I agree with the assessment, plan and orders as documented by the resident.  Please refer to the resident note for additional information.      Electronically signed by Grey Bowie MD on 9/30/2024 at 11:54 AM

## 2024-10-09 ENCOUNTER — HOSPITAL ENCOUNTER (OUTPATIENT)
Age: 79
Discharge: HOME | End: 2024-10-09
Payer: MEDICARE

## 2024-10-09 DIAGNOSIS — I10 ESSENTIAL HYPERTENSION: ICD-10-CM

## 2024-10-09 LAB
ALBUMIN SERPL-MCNC: 3.9 G/DL (ref 3.5–5.2)
ALP SERPL-CCNC: 102 U/L (ref 35–104)
ALT SERPL-CCNC: 11 U/L (ref 0–32)
ANION GAP SERPL CALCULATED.3IONS-SCNC: 10 MMOL/L (ref 7–16)
AST SERPL-CCNC: 21 U/L (ref 0–31)
BASOPHILS # BLD: 0.09 K/UL (ref 0–0.2)
BASOPHILS NFR BLD: 2 % (ref 0–2)
BILIRUB SERPL-MCNC: 0.5 MG/DL (ref 0–1.2)
BUN SERPL-MCNC: 21 MG/DL (ref 6–23)
CALCIUM SERPL-MCNC: 9.7 MG/DL (ref 8.6–10.2)
CHLORIDE SERPL-SCNC: 103 MMOL/L (ref 98–107)
CHOLEST SERPL-MCNC: 141 MG/DL
CO2 SERPL-SCNC: 25 MMOL/L (ref 22–29)
CREAT SERPL-MCNC: 1 MG/DL (ref 0.5–1)
EOSINOPHIL # BLD: 0.21 K/UL (ref 0.05–0.5)
EOSINOPHILS RELATIVE PERCENT: 4 % (ref 0–6)
ERYTHROCYTE [DISTWIDTH] IN BLOOD BY AUTOMATED COUNT: 15.6 % (ref 11.5–15)
GFR, ESTIMATED: 57 ML/MIN/1.73M2
GLUCOSE SERPL-MCNC: 96 MG/DL (ref 74–99)
HCT VFR BLD AUTO: 34.2 % (ref 34–48)
HDLC SERPL-MCNC: 64 MG/DL
HGB BLD-MCNC: 10.7 G/DL (ref 11.5–15.5)
IMM GRANULOCYTES # BLD AUTO: <0.03 K/UL (ref 0–0.58)
IMM GRANULOCYTES NFR BLD: 0 % (ref 0–5)
LDLC SERPL CALC-MCNC: 60 MG/DL
LYMPHOCYTES NFR BLD: 1.49 K/UL (ref 1.5–4)
LYMPHOCYTES RELATIVE PERCENT: 26 % (ref 20–42)
MCH RBC QN AUTO: 27.4 PG (ref 26–35)
MCHC RBC AUTO-ENTMCNC: 31.3 G/DL (ref 32–34.5)
MCV RBC AUTO: 87.5 FL (ref 80–99.9)
MONOCYTES NFR BLD: 0.67 K/UL (ref 0.1–0.95)
MONOCYTES NFR BLD: 12 % (ref 2–12)
NEUTROPHILS NFR BLD: 57 % (ref 43–80)
NEUTS SEG NFR BLD: 3.31 K/UL (ref 1.8–7.3)
PLATELET # BLD AUTO: 197 K/UL (ref 130–450)
PMV BLD AUTO: 11.8 FL (ref 7–12)
POTASSIUM SERPL-SCNC: 3.9 MMOL/L (ref 3.5–5)
PROT SERPL-MCNC: 7.4 G/DL (ref 6.4–8.3)
RBC # BLD AUTO: 3.91 M/UL (ref 3.5–5.5)
SODIUM SERPL-SCNC: 138 MMOL/L (ref 132–146)
TRIGL SERPL-MCNC: 87 MG/DL
TSH SERPL DL<=0.05 MIU/L-ACNC: 3.81 UIU/ML (ref 0.27–4.2)
VLDLC SERPL CALC-MCNC: 17 MG/DL
WBC OTHER # BLD: 5.8 K/UL (ref 4.5–11.5)

## 2024-10-09 PROCEDURE — 80061 LIPID PANEL: CPT

## 2024-10-09 PROCEDURE — 85025 COMPLETE CBC W/AUTO DIFF WBC: CPT

## 2024-10-09 PROCEDURE — 84443 ASSAY THYROID STIM HORMONE: CPT

## 2024-10-09 PROCEDURE — 80053 COMPREHEN METABOLIC PANEL: CPT

## 2024-10-09 PROCEDURE — 36415 COLL VENOUS BLD VENIPUNCTURE: CPT

## 2024-10-14 DIAGNOSIS — E78.2 MIXED HYPERLIPIDEMIA: ICD-10-CM

## 2024-10-14 RX ORDER — ATORVASTATIN CALCIUM 40 MG/1
40 TABLET, FILM COATED ORAL DAILY
Qty: 90 TABLET | Refills: 1 | Status: SHIPPED | OUTPATIENT
Start: 2024-10-14

## 2024-11-04 DIAGNOSIS — Z12.31 ENCOUNTER FOR SCREENING MAMMOGRAM FOR BREAST CANCER: Primary | ICD-10-CM

## 2024-11-18 SDOH — HEALTH STABILITY: PHYSICAL HEALTH: ON AVERAGE, HOW MANY MINUTES DO YOU ENGAGE IN EXERCISE AT THIS LEVEL?: 20 MIN

## 2024-11-18 SDOH — HEALTH STABILITY: PHYSICAL HEALTH: ON AVERAGE, HOW MANY DAYS PER WEEK DO YOU ENGAGE IN MODERATE TO STRENUOUS EXERCISE (LIKE A BRISK WALK)?: 3 DAYS

## 2024-11-18 ASSESSMENT — PATIENT HEALTH QUESTIONNAIRE - PHQ9
SUM OF ALL RESPONSES TO PHQ QUESTIONS 1-9: 0
1. LITTLE INTEREST OR PLEASURE IN DOING THINGS: NOT AT ALL
SUM OF ALL RESPONSES TO PHQ9 QUESTIONS 1 & 2: 0
2. FEELING DOWN, DEPRESSED OR HOPELESS: NOT AT ALL

## 2024-11-18 ASSESSMENT — LIFESTYLE VARIABLES
HOW MANY STANDARD DRINKS CONTAINING ALCOHOL DO YOU HAVE ON A TYPICAL DAY: 1 OR 2
HOW MANY STANDARD DRINKS CONTAINING ALCOHOL DO YOU HAVE ON A TYPICAL DAY: 1
HOW OFTEN DO YOU HAVE SIX OR MORE DRINKS ON ONE OCCASION: 1
HOW OFTEN DO YOU HAVE A DRINK CONTAINING ALCOHOL: 2
HOW OFTEN DO YOU HAVE A DRINK CONTAINING ALCOHOL: MONTHLY OR LESS

## 2024-11-21 ENCOUNTER — OFFICE VISIT (OUTPATIENT)
Dept: FAMILY MEDICINE CLINIC | Age: 79
End: 2024-11-21

## 2024-11-21 VITALS
HEART RATE: 89 BPM | SYSTOLIC BLOOD PRESSURE: 132 MMHG | DIASTOLIC BLOOD PRESSURE: 81 MMHG | OXYGEN SATURATION: 99 % | RESPIRATION RATE: 16 BRPM | TEMPERATURE: 97.7 F | HEIGHT: 58 IN | WEIGHT: 158 LBS | BODY MASS INDEX: 33.17 KG/M2

## 2024-11-21 DIAGNOSIS — Z00.00 MEDICARE ANNUAL WELLNESS VISIT, SUBSEQUENT: Primary | ICD-10-CM

## 2024-11-21 DIAGNOSIS — E78.2 MIXED HYPERLIPIDEMIA: ICD-10-CM

## 2024-11-21 DIAGNOSIS — I10 ESSENTIAL HYPERTENSION: ICD-10-CM

## 2024-11-21 RX ORDER — POTASSIUM CHLORIDE 1500 MG/1
20 TABLET, EXTENDED RELEASE ORAL DAILY
Qty: 90 TABLET | Refills: 1
Start: 2024-11-21

## 2024-11-21 RX ORDER — LOSARTAN POTASSIUM 100 MG/1
100 TABLET ORAL DAILY
Qty: 90 TABLET | Refills: 1
Start: 2024-11-21

## 2024-11-21 RX ORDER — ATORVASTATIN CALCIUM 40 MG/1
40 TABLET, FILM COATED ORAL DAILY
Qty: 90 TABLET | Refills: 1
Start: 2024-11-21

## 2024-11-21 RX ORDER — CHLORTHALIDONE 25 MG/1
25 TABLET ORAL DAILY
Qty: 90 TABLET | Refills: 1
Start: 2024-11-21

## 2024-11-21 NOTE — PROGRESS NOTES
[facility]  Precepting Note    Subjective:  Here for AWV, no acute complaints. Care gaps closed and chronic conditions are well controlled    ROS otherwise negative     Past medical, surgical, family and social history were reviewed, non-contributory, and unchanged unless otherwise stated.    Objective:    /81   Pulse 89   Temp 97.7 °F (36.5 °C) (Temporal)   Resp 16   Ht 1.473 m (4' 9.99\")   Wt 71.7 kg (158 lb)   LMP  (LMP Unknown)   SpO2 99%   BMI 33.03 kg/m²     Exam is as noted by resident with the following changes, additions or corrections:    General:  NAD; alert & oriented x 3   Heart:  RRR, no murmurs, gallops, or rubs.  Lungs:  CTA bilaterally, no wheeze, rales or rhonchi  Abd: bowel sounds present, nontender, nondistended, no masses  Extrem:  No clubbing, cyanosis, or edema    Assessment/Plan:  AWV per resident note  Chronic conditions controlled     Attending Physician Statement  I have reviewed the chart, including any radiology or labs. I have discussed the case, including pertinent history and exam findings with the resident.  I agree with the assessment, plan and orders as documented by the resident.  Please refer to the resident note for additional information.      Electronically signed by Ruel Dueñas MD on 11/21/2024 at 12:05 PM    
monitoring     Follows with dermatology, seborrheic keratoses on face; optho for cataracts     HCM:  DEXA scan done on 6/2023 WNL     Retired from [employer], stage makeup at [employer]    Patient's complete Health Risk Assessment and screening values have been reviewed and are found in Flowsheets. The following problems were reviewed today and where indicated follow up appointments were made and/or referrals ordered.    Positive Risk Factor Screenings with Interventions:                Abnormal BMI (obese):  Body mass index is 33.03 kg/m². (!) Abnormal  Interventions:  Patient comments: reports well balanced diet, active around two-story home            Advanced Directives:  Do you have a Living Will?: (!) No    Intervention:  Already received living will packet                 Objective   Vitals:    11/21/24 1043   BP: 132/81   Pulse: 89   Resp: 16   Temp: 97.7 °F (36.5 °C)   TempSrc: Temporal   SpO2: 99%   Weight: 71.7 kg (158 lb)   Height: 1.473 m (4' 9.99\")      Body mass index is 33.03 kg/m².        General Appearance: alert and oriented to person, place and time, well developed and well- nourished, in no acute distress  Skin: warm and dry, no rash or erythema  Head: normocephalic and atraumatic  Eyes: pupils equal, round, and reactive to light, extraocular eye movements intact, conjunctivae normal  ENT: tympanic membrane, external ear and ear canal normal bilaterally, nose without deformity, nasal mucosa and turbinates normal without polyps  Neck: supple and non-tender without mass, no thyromegaly or thyroid nodules, no cervical lymphadenopathy  Pulmonary/Chest: clear to auscultation bilaterally- no wheezes, rales or rhonchi, normal air movement, no respiratory distress  Cardiovascular: normal rate, regular rhythm, normal S1 and S2, no murmurs, rubs, clicks, or gallops, distal pulses intact, no carotid bruits  Abdomen: soft, non-tender, non-distended, normal bowel sounds, no masses or organomegaly  Extremities: no

## 2024-11-27 DIAGNOSIS — I10 ESSENTIAL HYPERTENSION: ICD-10-CM

## 2024-11-29 RX ORDER — CHLORTHALIDONE 25 MG/1
25 TABLET ORAL DAILY
Qty: 90 TABLET | Refills: 1 | OUTPATIENT
Start: 2024-11-29

## 2024-12-13 ENCOUNTER — OFFICE VISIT (OUTPATIENT)
Dept: ONCOLOGY | Age: 79
End: 2024-12-13
Payer: MEDICARE

## 2024-12-13 ENCOUNTER — HOSPITAL ENCOUNTER (OUTPATIENT)
Dept: INFUSION THERAPY | Age: 79
Discharge: HOME | End: 2024-12-13
Payer: MEDICARE

## 2024-12-13 VITALS
BODY MASS INDEX: 33.89 KG/M2 | HEIGHT: 57 IN | DIASTOLIC BLOOD PRESSURE: 72 MMHG | TEMPERATURE: 97.3 F | HEART RATE: 82 BPM | OXYGEN SATURATION: 100 % | SYSTOLIC BLOOD PRESSURE: 165 MMHG | WEIGHT: 157.1 LBS

## 2024-12-13 DIAGNOSIS — D64.9 ANEMIA, UNSPECIFIED TYPE: ICD-10-CM

## 2024-12-13 DIAGNOSIS — D64.9 ANEMIA, UNSPECIFIED TYPE: Primary | ICD-10-CM

## 2024-12-13 DIAGNOSIS — D05.12 DUCTAL CARCINOMA IN SITU (DCIS) OF LEFT BREAST: ICD-10-CM

## 2024-12-13 LAB
BASOPHILS # BLD: 0.08 K/UL (ref 0–0.2)
BASOPHILS NFR BLD: 2 % (ref 0–2)
EOSINOPHIL # BLD: 0.08 K/UL (ref 0.05–0.5)
EOSINOPHILS RELATIVE PERCENT: 2 % (ref 0–6)
ERYTHROCYTE [DISTWIDTH] IN BLOOD BY AUTOMATED COUNT: 15.2 % (ref 11.5–15)
FOLATE SERPL-MCNC: >20 NG/ML (ref 4.8–24.2)
HCT VFR BLD AUTO: 35.1 % (ref 34–48)
HGB BLD-MCNC: 11.1 G/DL (ref 11.5–15.5)
IMM GRANULOCYTES # BLD AUTO: <0.03 K/UL (ref 0–0.58)
IMM GRANULOCYTES NFR BLD: 0 % (ref 0–5)
LYMPHOCYTES NFR BLD: 1.21 K/UL (ref 1.5–4)
LYMPHOCYTES RELATIVE PERCENT: 23 % (ref 20–42)
MCH RBC QN AUTO: 27.1 PG (ref 26–35)
MCHC RBC AUTO-ENTMCNC: 31.6 G/DL (ref 32–34.5)
MCV RBC AUTO: 85.8 FL (ref 80–99.9)
MONOCYTES NFR BLD: 0.6 K/UL (ref 0.1–0.95)
MONOCYTES NFR BLD: 11 % (ref 2–12)
NEUTROPHILS NFR BLD: 63 % (ref 43–80)
NEUTS SEG NFR BLD: 3.34 K/UL (ref 1.8–7.3)
PLATELET # BLD AUTO: 200 K/UL (ref 130–450)
PMV BLD AUTO: 12 FL (ref 7–12)
RBC # BLD AUTO: 4.09 M/UL (ref 3.5–5.5)
SEND OUT REPORT: NORMAL
TEST NAME: NORMAL
VIT B12 SERPL-MCNC: 1022 PG/ML (ref 211–946)
WBC OTHER # BLD: 5.3 K/UL (ref 4.5–11.5)

## 2024-12-13 PROCEDURE — 99214 OFFICE O/P EST MOD 30 MIN: CPT | Performed by: INTERNAL MEDICINE

## 2024-12-13 PROCEDURE — 85025 COMPLETE CBC W/AUTO DIFF WBC: CPT

## 2024-12-13 PROCEDURE — 84165 PROTEIN E-PHORESIS SERUM: CPT

## 2024-12-13 PROCEDURE — 3078F DIAST BP <80 MM HG: CPT | Performed by: INTERNAL MEDICINE

## 2024-12-13 PROCEDURE — 82607 VITAMIN B-12: CPT

## 2024-12-13 PROCEDURE — 3077F SYST BP >= 140 MM HG: CPT | Performed by: INTERNAL MEDICINE

## 2024-12-13 PROCEDURE — 1160F RVW MEDS BY RX/DR IN RCRD: CPT | Performed by: INTERNAL MEDICINE

## 2024-12-13 PROCEDURE — 1159F MED LIST DOCD IN RCRD: CPT | Performed by: INTERNAL MEDICINE

## 2024-12-13 PROCEDURE — 36415 COLL VENOUS BLD VENIPUNCTURE: CPT

## 2024-12-13 PROCEDURE — 1123F ACP DISCUSS/DSCN MKR DOCD: CPT | Performed by: INTERNAL MEDICINE

## 2024-12-13 PROCEDURE — 84155 ASSAY OF PROTEIN SERUM: CPT

## 2024-12-13 PROCEDURE — 82746 ASSAY OF FOLIC ACID SERUM: CPT

## 2024-12-13 PROCEDURE — 1126F AMNT PAIN NOTED NONE PRSNT: CPT | Performed by: INTERNAL MEDICINE

## 2024-12-13 NOTE — PROGRESS NOTES
[facility] PHYSICIANS [facility] CARE [facility] MED ONCOLOGY  [address]  [facility] [address]  Dept: [phone]  Loc: [phone]  Attending Progress note      Reason for Visit:   Left Breast DCIS.    Referring Physician:  Stacy Levine MD.    PCP:  Lissa Rodriguez DO    History of Present Illness:    The patient is a pleasant 79 y.o. lady, with a past medical history significant for hyperlipidemia, and hypertension, who had presented with an abnormal screening Mammogram:  MAMMOGRAM (12/11/2018):  INDICATION:   Screening.       HISTORY:   The patient has no personal history of cancer. The patient has the following family history of breast cancer:  maternal aunt.       MAMMOGRAM VIEWS:   The following mammographic views where obtained: bilateral craniocaudal; bilateral craniocaudal with tomosynthesis; bilateral mediolateral oblique; and bilateral mediolateral oblique with tomosynthesis       TOMOSYNTHESIS:   Tomosynthesis (3 Dimensional Breast Imaging) was used on this examination to aid in evaluation.       COMPARISON:   No prior imaging studies are available for comparison.       CAD:   This exam was reviewed using the [employer] Computer Aided Detection (CAD)       TISSUE DENSITY:   The breasts are heterogeneously dense (Type 3 density).       MAMMOGRAM FINDINGS:   In the right breast, no suspicious masses, areas of suspicious architectural distortion, suspicious calcifications, or additional suspicious findings are identified.           Finding 1:   There are coarse heterogeneous and fine pleomorphic calcifications measuring 17 mm seen in the upper outer quadrant of the left breast.       IMPRESSION:   Calcifications in the left breast require additional evaluation.   Spot magnification views with consideration for a stereotactic biopsy if needed.       =======================================   BI-RADS Category 0:  Incomplete: Need Additional Imaging Evaluation

## 2024-12-16 LAB
ALBUMIN SERPL-MCNC: 3.4 G/DL (ref 3.5–4.7)
ALPHA1 GLOB SERPL ELPH-MCNC: 0.3 G/DL (ref 0.2–0.4)
ALPHA2 GLOB SERPL ELPH-MCNC: 0.8 G/DL (ref 0.5–1)
B-GLOBULIN SERPL ELPH-MCNC: 1.1 G/DL (ref 0.8–1.3)
GAMMA GLOB SERPL ELPH-MCNC: 1.7 G/DL (ref 0.7–1.6)
PATHOLOGIST: ABNORMAL
PROT PATTERN SERPL ELPH-IMP: ABNORMAL
PROT SERPL-MCNC: 7.4 G/DL (ref 6.4–8.3)

## 2024-12-17 LAB
SEND OUT REPORT: NORMAL
TEST NAME: NORMAL

## 2024-12-30 DIAGNOSIS — I10 ESSENTIAL HYPERTENSION: ICD-10-CM

## 2024-12-30 DIAGNOSIS — E78.2 MIXED HYPERLIPIDEMIA: ICD-10-CM

## 2024-12-30 RX ORDER — CHLORTHALIDONE 25 MG/1
25 TABLET ORAL DAILY
Qty: 90 TABLET | Refills: 1 | Status: SHIPPED | OUTPATIENT
Start: 2024-12-30

## 2024-12-30 RX ORDER — LOSARTAN POTASSIUM 100 MG/1
100 TABLET ORAL DAILY
Qty: 90 TABLET | Refills: 1 | Status: SHIPPED | OUTPATIENT
Start: 2024-12-30

## 2024-12-30 RX ORDER — POTASSIUM CHLORIDE 1500 MG/1
20 TABLET, EXTENDED RELEASE ORAL DAILY
Qty: 90 TABLET | Refills: 1 | Status: SHIPPED | OUTPATIENT
Start: 2024-12-30

## 2024-12-30 RX ORDER — ATORVASTATIN CALCIUM 40 MG/1
40 TABLET, FILM COATED ORAL DAILY
Qty: 90 TABLET | Refills: 1 | Status: SHIPPED | OUTPATIENT
Start: 2024-12-30

## 2025-01-17 ENCOUNTER — OFFICE VISIT (OUTPATIENT)
Dept: FAMILY MEDICINE CLINIC | Age: 80
End: 2025-01-17
Payer: MEDICARE

## 2025-01-17 VITALS
SYSTOLIC BLOOD PRESSURE: 154 MMHG | WEIGHT: 152 LBS | DIASTOLIC BLOOD PRESSURE: 68 MMHG | OXYGEN SATURATION: 96 % | HEART RATE: 99 BPM | TEMPERATURE: 97.5 F | RESPIRATION RATE: 18 BRPM | HEIGHT: 58 IN | BODY MASS INDEX: 31.91 KG/M2

## 2025-01-17 DIAGNOSIS — I10 HYPERTENSION, UNSPECIFIED TYPE: ICD-10-CM

## 2025-01-17 DIAGNOSIS — A08.4 VIRAL GASTROENTERITIS: Primary | ICD-10-CM

## 2025-01-17 PROCEDURE — 1159F MED LIST DOCD IN RCRD: CPT

## 2025-01-17 PROCEDURE — 99213 OFFICE O/P EST LOW 20 MIN: CPT

## 2025-01-17 PROCEDURE — 3078F DIAST BP <80 MM HG: CPT

## 2025-01-17 PROCEDURE — 3077F SYST BP >= 140 MM HG: CPT

## 2025-01-17 PROCEDURE — 1123F ACP DISCUSS/DSCN MKR DOCD: CPT

## 2025-01-17 SDOH — ECONOMIC STABILITY: FOOD INSECURITY: WITHIN THE PAST 12 MONTHS, YOU WORRIED THAT YOUR FOOD WOULD RUN OUT BEFORE YOU GOT MONEY TO BUY MORE.: NEVER TRUE

## 2025-01-17 SDOH — ECONOMIC STABILITY: FOOD INSECURITY: WITHIN THE PAST 12 MONTHS, THE FOOD YOU BOUGHT JUST DIDN'T LAST AND YOU DIDN'T HAVE MONEY TO GET MORE.: NEVER TRUE

## 2025-01-17 NOTE — PROGRESS NOTES
[facility]  Pre[facility]ing Note    Subjective:  Diarrhea x 1 week  3 days the resolved then restarted yesterday  No travel, abx use   Watery, no blood  Pepto bismol tried   No N/V/F/S/C    ROS otherwise negative     Past medical, surgical, family and social history were reviewed, non-contributory, and unchanged unless otherwise stated.    Objective:    BP (!) 154/68   Pulse 99   Temp 97.5 °F (36.4 °C) (Temporal)   Resp 18   Ht 1.473 m (4' 10\")   Wt 68.9 kg (152 lb)   LMP  (LMP Unknown)   SpO2 96%   BMI 31.77 kg/m²     Exam is as noted by resident with the following changes, additions or corrections:    General:  NAD; alert & oriented x 3   ENT: MM moist   Heart:  RRR, no murmurs, gallops, or rubs.  Lungs:  CTA bilaterally, no wheeze, rales or rhonchi  Abd: bowel sounds present, nontender, nondistended, no masses      Assessment/Plan:  Diarrhea   Likely viral pathogen   Symptomatic control reviewed and reasons to return to the ER in the interim   HTN   Elevated BP; sates did not take meds this AM      Attending Physician Statement  I have reviewed the chart, including any radiology or labs. I have discussed the case, including pertinent history and exam findings with the resident.  I agree with the assessment, plan and orders as documented by the resident.  Please refer to the resident note for additional information.      Electronically signed by Thomas Nunes MD on 1/17/2025 at 10:36 AM

## 2025-01-17 NOTE — PROGRESS NOTES
[facility]  Department of Family Medicine  Family Medicine Residency Program      Patient: Joan Staley 79 y.o. female     Date of Service: 1/17/25      Chief complaint:   Chief Complaint   Patient presents with    Diarrhea     Began last weekend    Had resolved    Now returned  All liquid     No fevers       HISTORY OF PRESENTING ILLNESS     79 y.o. female presented to the clinic        Diarrhea:  - started last Friday - Sunday.   - started again yesterday.   - drank coke which was in fridge opened for few day.   -  3 episodes today, watery,   - took peptobismuth on Saturday  and yesterday.   - NBNB .   - nauseous on Monday , not now.   - denies emesis  - denies fever.   - having cold as well , chronic cough   - stool floating in toilet.   - denies recent antibiotics.       HTN:  - forgot to take cozaar 100 mg daily, chlorthalidone 25 mg   - denies Cp ,SOB.     Health Maintenance:  Health Maintenance Due   Topic Date Due    Respiratory Syncytial Virus (RSV) Pregnant or age 60 yrs+ (1 - 1-dose 75+ series) 10/18/2024    Annual Wellness Visit (Medicare Advantage)  01/01/2025     Past Medical History:      Diagnosis Date    Allergic reaction 06/22/2021    Cause unclear    Breast cancer (HCC)     Cancer (HCC) 2019    breast    Ductal carcinoma in situ (DCIS) of left breast 01/10/2019    Intermediate to high grade ductal carcinoma in situ (papillary, cribriform, and comedo types).  Estrogen Receptors (ER):  Positive:         Percentage of cells positive:  >90%         Intensity:  Strong    Progesterone Receptors (WV):  Positive:         Percentage of cells positive:  90%         Intensity:  Moderate to strong      History of therapeutic radiation     Hyperlipidemia     Hypertension     Pneumonia 1953    Shingles      Past Surgical History:        Procedure Laterality Date    BREAST BIOPSY Left 2/27/2019    LEFT BREAST NEEDLE LOCALIZED  LUMPECTOMY  WITH BIOZORB  PLACEMENT --TRIDENT

## 2025-01-30 ENCOUNTER — OFFICE VISIT (OUTPATIENT)
Dept: FAMILY MEDICINE CLINIC | Age: 80
End: 2025-01-30

## 2025-01-30 VITALS
OXYGEN SATURATION: 98 % | HEART RATE: 90 BPM | DIASTOLIC BLOOD PRESSURE: 71 MMHG | BODY MASS INDEX: 31.07 KG/M2 | TEMPERATURE: 97.4 F | SYSTOLIC BLOOD PRESSURE: 137 MMHG | HEIGHT: 58 IN | WEIGHT: 148 LBS | RESPIRATION RATE: 16 BRPM

## 2025-01-30 DIAGNOSIS — E86.0 DEHYDRATION: Primary | ICD-10-CM

## 2025-01-30 LAB
ANION GAP SERPL CALCULATED.3IONS-SCNC: 13 MMOL/L (ref 7–16)
BUN BLDV-MCNC: 19 MG/DL (ref 6–23)
CALCIUM SERPL-MCNC: 10 MG/DL (ref 8.6–10.2)
CHLORIDE BLD-SCNC: 101 MMOL/L (ref 98–107)
CO2: 24 MMOL/L (ref 22–29)
CREAT SERPL-MCNC: 1.4 MG/DL (ref 0.5–1)
GFR, ESTIMATED: 38 ML/MIN/1.73M2
GLUCOSE BLD-MCNC: 121 MG/DL (ref 74–99)
POTASSIUM SERPL-SCNC: 4.5 MMOL/L (ref 3.5–5)
SODIUM BLD-SCNC: 138 MMOL/L (ref 132–146)

## 2025-01-30 ASSESSMENT — PATIENT HEALTH QUESTIONNAIRE - PHQ9
1. LITTLE INTEREST OR PLEASURE IN DOING THINGS: NOT AT ALL
SUM OF ALL RESPONSES TO PHQ QUESTIONS 1-9: 0
SUM OF ALL RESPONSES TO PHQ QUESTIONS 1-9: 0
2. FEELING DOWN, DEPRESSED OR HOPELESS: NOT AT ALL
SUM OF ALL RESPONSES TO PHQ9 QUESTIONS 1 & 2: 0
SUM OF ALL RESPONSES TO PHQ QUESTIONS 1-9: 0
SUM OF ALL RESPONSES TO PHQ QUESTIONS 1-9: 0

## 2025-01-30 NOTE — PROGRESS NOTES
[facility]  Precepting Note    Subjective:  F/u viral gastro  Was sent home to hydrate  Still having diarrhea  2-3 episodes a day, now down to just once a day  Today actually just some gas pains  Appetite back  Had mac n' cheese last night  Drinking Pedialyte, Gatorade  Overall feels better  No fevers  No blood in diarrhea  Vitals stable today     ROS otherwise negative     Past medical, surgical, family and social history were reviewed, non-contributory, and unchanged unless otherwise stated.    Objective:    /71   Pulse 90   Temp 97.4 °F (36.3 °C) (Temporal)   Resp 16   Ht 1.473 m (4' 10\")   Wt 67.1 kg (148 lb)   LMP  (LMP Unknown)   SpO2 98%   BMI 30.93 kg/m²     Exam is as noted by resident with which I agree     Assessment/Plan:  Resolving gastro  Possible mild dehydration   Rec 64oz fluids daily; can increase if urine still not clear   Check BMP today  Further recommendations pending results         Attending Physician Statement  I have reviewed the chart, including any radiology or labs. I have discussed the case, including pertinent history and exam findings with the resident.  I agree with the assessment, plan and orders as documented by the resident.  Please refer to the resident note for additional information.      Electronically signed by Nicole Hernandez MD on 1/30/2025 at 10:09 AM

## 2025-01-30 NOTE — PROGRESS NOTES
[facility] Medicine Residency Program    CC: Joan Staley is a 79 y.o. yo female is here for evaluation evaluation for the following acute medical concerns: Diarrhea (X 2 weeks, on and off)      HPI:    Viral gastroenteritis: seen in clinic 1/17/25, diarrhea on and off for several days, now diarrhea resolved, having some gas pains, denies fever  9-10 days over past two weeks having diarrhea, mostly in am, previously 3x per day; Pedialyte now, still slightly nauseated, ate macaroni and cheese last night, Monday and Tuesday took Pepto Bismol, also drinking Gatorade  Denies abdominal pain besides occasional gas pain, chest pain, shortness of breath, lightheadedness, dizziness  Denies blood in diarrhea    Normocytic anemia: following with Dr. Jamaal Irizarry, iron studies reassuring, fit test negative, following up in March     ROS negative unless otherwise noted    Vitals:   /71   Pulse 90   Temp 97.4 °F (36.3 °C) (Temporal)   Resp 16   Ht 1.473 m (4' 10\")   Wt 67.1 kg (148 lb)   LMP  (LMP Unknown)   SpO2 98%   BMI 30.93 kg/m²   Wt Readings from Last 3 Encounters:   01/30/25 67.1 kg (148 lb)   01/17/25 68.9 kg (152 lb)   12/13/24 71.3 kg (157 lb 1.6 oz)       PE:  Physical Exam  Constitutional:       General: She is not in acute distress.  HENT:      Head: Normocephalic and atraumatic.   Eyes:      Extraocular Movements: Extraocular movements intact.   Cardiovascular:      Rate and Rhythm: Normal rate and regular rhythm.      Heart sounds: No murmur heard.     No friction rub. No gallop.   Pulmonary:      Breath sounds: Normal breath sounds. No wheezing, rhonchi or rales.   Abdominal:      Tenderness: There is no abdominal tenderness. There is no guarding.   Musculoskeletal:      Right lower leg: No edema.      Left lower leg: No edema.   Neurological:      Mental Status: She is alert.         A / P:     Diagnosis Orders   1. Dehydration  Basic Metabolic Panel        Check bmp given hx GI losses

## 2025-01-31 DIAGNOSIS — R79.89 ELEVATED SERUM CREATININE: ICD-10-CM

## 2025-01-31 DIAGNOSIS — E86.0 DEHYDRATION: Primary | ICD-10-CM

## 2025-01-31 NOTE — RESULT ENCOUNTER NOTE
Discussed lab results with patient over the phone.  Discussed with patient that her creatinine increased and her kidney function has worsened from baseline.  Discussed the need for aggressive hydration over the weekend and to avoid NSAIDs. Patient agreeable to have labs rechecked Monday, and if worsened labs on Monday or decreased urination or increased GI output over the weekend, patient to present to ER for further evaluation and treatment.  Patient verbalized understanding.

## 2025-02-03 ENCOUNTER — HOSPITAL ENCOUNTER (OUTPATIENT)
Age: 80
Discharge: HOME | End: 2025-02-03
Payer: MEDICARE

## 2025-02-03 DIAGNOSIS — E86.0 DEHYDRATION: ICD-10-CM

## 2025-02-03 DIAGNOSIS — R79.89 ELEVATED SERUM CREATININE: ICD-10-CM

## 2025-02-03 DIAGNOSIS — R79.89 ELEVATED SERUM CREATININE: Primary | ICD-10-CM

## 2025-02-03 LAB
ANION GAP SERPL CALCULATED.3IONS-SCNC: 14 MMOL/L (ref 7–16)
BUN SERPL-MCNC: 15 MG/DL (ref 6–23)
CALCIUM SERPL-MCNC: 10 MG/DL (ref 8.6–10.2)
CHLORIDE SERPL-SCNC: 100 MMOL/L (ref 98–107)
CO2 SERPL-SCNC: 23 MMOL/L (ref 22–29)
CREAT SERPL-MCNC: 1.3 MG/DL (ref 0.5–1)
GFR, ESTIMATED: 41 ML/MIN/1.73M2
GLUCOSE SERPL-MCNC: 107 MG/DL (ref 74–99)
POTASSIUM SERPL-SCNC: 4.7 MMOL/L (ref 3.5–5)
SODIUM SERPL-SCNC: 137 MMOL/L (ref 132–146)

## 2025-02-03 PROCEDURE — 36415 COLL VENOUS BLD VENIPUNCTURE: CPT

## 2025-02-03 PROCEDURE — 80048 BASIC METABOLIC PNL TOTAL CA: CPT

## 2025-02-03 NOTE — RESULT ENCOUNTER NOTE
Discussed recent bmp findings with patient, patient verbalized understanding and is agreeable to continue to hydrate, avoid NSAIDs, and get BMP done in 1 week. To call if diarrhea sxs return/worsen or if develops new symptoms like fevers or chills.

## 2025-02-10 ENCOUNTER — HOSPITAL ENCOUNTER (OUTPATIENT)
Age: 80
Discharge: HOME | End: 2025-02-10
Payer: MEDICARE

## 2025-02-10 DIAGNOSIS — R79.89 ELEVATED SERUM CREATININE: ICD-10-CM

## 2025-02-10 LAB
ANION GAP SERPL CALCULATED.3IONS-SCNC: 12 MMOL/L (ref 7–16)
BUN SERPL-MCNC: 17 MG/DL (ref 6–23)
CALCIUM SERPL-MCNC: 10.1 MG/DL (ref 8.6–10.2)
CHLORIDE SERPL-SCNC: 100 MMOL/L (ref 98–107)
CO2 SERPL-SCNC: 25 MMOL/L (ref 22–29)
CREAT SERPL-MCNC: 1.3 MG/DL (ref 0.5–1)
GFR, ESTIMATED: 44 ML/MIN/1.73M2
GLUCOSE SERPL-MCNC: 103 MG/DL (ref 74–99)
POTASSIUM SERPL-SCNC: 4.3 MMOL/L (ref 3.5–5)
SODIUM SERPL-SCNC: 137 MMOL/L (ref 132–146)

## 2025-02-10 PROCEDURE — 36415 COLL VENOUS BLD VENIPUNCTURE: CPT

## 2025-02-10 PROCEDURE — 80048 BASIC METABOLIC PNL TOTAL CA: CPT

## 2025-02-12 NOTE — RESULT ENCOUNTER NOTE
Creatinine stable. I attempted to call patient, no answer.   Please call her and confirm that her diarrhea has stopped and she is urinating normally. As long as this is true, we will recheck her kidney function again when I see her on 2/24/25. Please have her bring her medications to the next appointment.

## 2025-03-04 ENCOUNTER — OFFICE VISIT (OUTPATIENT)
Dept: BREAST CENTER | Age: 80
End: 2025-03-04
Payer: MEDICARE

## 2025-03-04 ENCOUNTER — HOSPITAL ENCOUNTER (OUTPATIENT)
Dept: GENERAL RADIOLOGY | Age: 80
Discharge: HOME | End: 2025-03-06
Payer: MEDICARE

## 2025-03-04 VITALS
SYSTOLIC BLOOD PRESSURE: 132 MMHG | TEMPERATURE: 97.9 F | OXYGEN SATURATION: 98 % | HEIGHT: 58 IN | BODY MASS INDEX: 30.86 KG/M2 | RESPIRATION RATE: 18 BRPM | HEART RATE: 78 BPM | WEIGHT: 147 LBS | DIASTOLIC BLOOD PRESSURE: 70 MMHG

## 2025-03-04 DIAGNOSIS — Z12.31 ENCOUNTER FOR SCREENING MAMMOGRAM FOR BREAST CANCER: ICD-10-CM

## 2025-03-04 DIAGNOSIS — Z85.3 PERSONAL HISTORY OF BREAST CANCER: Primary | ICD-10-CM

## 2025-03-04 PROCEDURE — 3075F SYST BP GE 130 - 139MM HG: CPT | Performed by: NURSE PRACTITIONER

## 2025-03-04 PROCEDURE — 99213 OFFICE O/P EST LOW 20 MIN: CPT | Performed by: NURSE PRACTITIONER

## 2025-03-04 PROCEDURE — 3078F DIAST BP <80 MM HG: CPT | Performed by: NURSE PRACTITIONER

## 2025-03-04 PROCEDURE — 77063 BREAST TOMOSYNTHESIS BI: CPT

## 2025-03-04 PROCEDURE — 1123F ACP DISCUSS/DSCN MKR DOCD: CPT | Performed by: NURSE PRACTITIONER

## 2025-03-04 PROCEDURE — 1159F MED LIST DOCD IN RCRD: CPT | Performed by: NURSE PRACTITIONER

## 2025-03-04 PROCEDURE — 1160F RVW MEDS BY RX/DR IN RCRD: CPT | Performed by: NURSE PRACTITIONER

## 2025-03-06 ENCOUNTER — OFFICE VISIT (OUTPATIENT)
Dept: FAMILY MEDICINE CLINIC | Age: 80
End: 2025-03-06

## 2025-03-06 VITALS
OXYGEN SATURATION: 99 % | RESPIRATION RATE: 18 BRPM | BODY MASS INDEX: 30.86 KG/M2 | HEART RATE: 82 BPM | DIASTOLIC BLOOD PRESSURE: 82 MMHG | WEIGHT: 147 LBS | TEMPERATURE: 98 F | SYSTOLIC BLOOD PRESSURE: 136 MMHG | HEIGHT: 58 IN

## 2025-03-06 DIAGNOSIS — R79.89 ELEVATED SERUM CREATININE: Primary | ICD-10-CM

## 2025-03-06 NOTE — PROGRESS NOTES
[facility]  Precepting Note    Subjective:  F/u   Had viral gastro about 4 weeks ago, a lot of diarrhea  At the time had elevated creatinine  Secondary to dehydration   She did show persistent elevation and she wanted it rechecked   No NSAIDs  Cont on losartan, chlorthalidone which she's taken for years  Clinically well that this time     ROS otherwise negative     Past medical, surgical, family and social history were reviewed, non-contributory, and unchanged unless otherwise stated.    Objective:    /82   Pulse 82   Temp 98 °F (36.7 °C) (Temporal)   Resp 18   Ht 1.473 m (4' 10\")   Wt 66.7 kg (147 lb)   LMP  (LMP Unknown)   SpO2 99%   BMI 30.72 kg/m²     Exam is as noted by resident with which I agree    Assessment/Plan:  Dehydration -   Recheck BMP        Attending Physician Statement  I have reviewed the chart, including any radiology or labs. I have discussed the case, including pertinent history and exam findings with the resident.  I agree with the assessment, plan and orders as documented by the resident.  Please refer to the resident note for additional information.      Electronically signed by Nicole Hernandez MD on 3/6/2025 at 11:41 AM

## 2025-03-06 NOTE — PROGRESS NOTES
[facility] Medicine Residency Program    CC: Joan Staley is a 80 y.o. yo female is here for evaluation evaluation for the following medical concerns: Follow-up (Dehydration and diarrhea have resolved)      HPI:    Viral gastroenteritis: seen in clinic 1/17/25, diarrhea on and off for several days, now diarrhea resolved, denies fever  Denies abdominal pain, chest pain, shortness of breath, lightheadedness, dizziness; denies blood in diarrhea  Cr trend from 1.0>1.4>1.3>1.3  BRAT diet, going back to normal diet now  Eating and drinking normal, urinating like normal, still drinking increased amount of fluids  Denies NSAID use      ROS negative unless otherwise noted.    Vitals:   /82   Pulse 82   Temp 98 °F (36.7 °C) (Temporal)   Resp 18   Ht 1.473 m (4' 10\")   Wt 66.7 kg (147 lb)   LMP  (LMP Unknown)   SpO2 99%   BMI 30.72 kg/m²   Wt Readings from Last 3 Encounters:   03/06/25 66.7 kg (147 lb)   03/04/25 66.7 kg (147 lb)   01/30/25 67.1 kg (148 lb)       PE:  Physical Exam  Constitutional:       General: She is not in acute distress.  HENT:      Head: Normocephalic and atraumatic.   Eyes:      Extraocular Movements: Extraocular movements intact.   Cardiovascular:      Rate and Rhythm: Normal rate and regular rhythm.      Heart sounds: No murmur heard.     No friction rub. No gallop.   Pulmonary:      Breath sounds: Normal breath sounds. No wheezing, rhonchi or rales.   Abdominal:      Tenderness: There is no abdominal tenderness. There is no guarding.   Musculoskeletal:      Right lower leg: No edema.      Left lower leg: No edema.   Neurological:      Mental Status: She is alert.         A / P:     Diagnosis Orders   1. Elevated serum creatinine  Basic Metabolic Panel        GI losses resolved, normalizing diet, recheck BMP to check kidney function    RTO: Return in about 3 months (around 6/6/2025) for htn.    Call or go to ED immediately if symptoms worsen or persist. Advised patient to

## 2025-03-07 LAB
ANION GAP SERPL CALCULATED.3IONS-SCNC: 14 MMOL/L (ref 7–16)
BUN BLDV-MCNC: 17 MG/DL (ref 6–23)
CALCIUM SERPL-MCNC: 10 MG/DL (ref 8.6–10.2)
CHLORIDE BLD-SCNC: 100 MMOL/L (ref 98–107)
CO2: 21 MMOL/L (ref 22–29)
CREAT SERPL-MCNC: 1 MG/DL (ref 0.5–1)
GFR, ESTIMATED: 58 ML/MIN/1.73M2
GLUCOSE BLD-MCNC: 104 MG/DL (ref 74–99)
POTASSIUM SERPL-SCNC: 4 MMOL/L (ref 3.5–5)
SODIUM BLD-SCNC: 135 MMOL/L (ref 132–146)

## 2025-03-10 ENCOUNTER — RESULTS FOLLOW-UP (OUTPATIENT)
Dept: FAMILY MEDICINE CLINIC | Age: 80
End: 2025-03-10

## 2025-03-14 ENCOUNTER — OFFICE VISIT (OUTPATIENT)
Dept: ONCOLOGY | Age: 80
End: 2025-03-14

## 2025-03-14 ENCOUNTER — HOSPITAL ENCOUNTER (OUTPATIENT)
Dept: INFUSION THERAPY | Age: 80
Discharge: HOME | End: 2025-03-14
Payer: MEDICARE

## 2025-03-14 VITALS
HEART RATE: 92 BPM | BODY MASS INDEX: 30.79 KG/M2 | RESPIRATION RATE: 18 BRPM | DIASTOLIC BLOOD PRESSURE: 68 MMHG | WEIGHT: 147.3 LBS | SYSTOLIC BLOOD PRESSURE: 153 MMHG | TEMPERATURE: 97.6 F | OXYGEN SATURATION: 98 %

## 2025-03-14 DIAGNOSIS — D64.9 ANEMIA, UNSPECIFIED TYPE: Primary | ICD-10-CM

## 2025-03-14 DIAGNOSIS — D05.12 DUCTAL CARCINOMA IN SITU (DCIS) OF LEFT BREAST: ICD-10-CM

## 2025-03-14 DIAGNOSIS — D64.9 ANEMIA, UNSPECIFIED TYPE: ICD-10-CM

## 2025-03-14 LAB
ALBUMIN SERPL-MCNC: 4.5 G/DL (ref 3.5–5.2)
ALP SERPL-CCNC: 110 U/L (ref 35–104)
ALT SERPL-CCNC: 14 U/L (ref 0–32)
ANION GAP SERPL CALCULATED.3IONS-SCNC: 19 MMOL/L (ref 7–16)
AST SERPL-CCNC: 22 U/L (ref 0–31)
BASOPHILS # BLD: 0.06 K/UL (ref 0–0.2)
BASOPHILS NFR BLD: 1 % (ref 0–2)
BILIRUB SERPL-MCNC: 0.5 MG/DL (ref 0–1.2)
BUN SERPL-MCNC: 18 MG/DL (ref 6–23)
CALCIUM SERPL-MCNC: 10.2 MG/DL (ref 8.6–10.2)
CHLORIDE SERPL-SCNC: 98 MMOL/L (ref 98–107)
CO2 SERPL-SCNC: 19 MMOL/L (ref 22–29)
CREAT SERPL-MCNC: 1.1 MG/DL (ref 0.5–1)
EOSINOPHIL # BLD: 0.06 K/UL (ref 0.05–0.5)
EOSINOPHILS RELATIVE PERCENT: 1 % (ref 0–6)
ERYTHROCYTE [DISTWIDTH] IN BLOOD BY AUTOMATED COUNT: 16 % (ref 11.5–15)
FERRITIN SERPL-MCNC: 342 NG/ML
GFR, ESTIMATED: 53 ML/MIN/1.73M2
GLUCOSE SERPL-MCNC: 106 MG/DL (ref 74–99)
HCT VFR BLD AUTO: 35 % (ref 34–48)
HGB BLD-MCNC: 11.4 G/DL (ref 11.5–15.5)
IMM GRANULOCYTES # BLD AUTO: <0.03 K/UL (ref 0–0.58)
IMM GRANULOCYTES NFR BLD: 0 % (ref 0–5)
IRON SATN MFR SERPL: 21 % (ref 15–50)
IRON SERPL-MCNC: 61 UG/DL (ref 37–145)
LYMPHOCYTES NFR BLD: 1.32 K/UL (ref 1.5–4)
LYMPHOCYTES RELATIVE PERCENT: 20 % (ref 20–42)
MCH RBC QN AUTO: 27.7 PG (ref 26–35)
MCHC RBC AUTO-ENTMCNC: 32.6 G/DL (ref 32–34.5)
MCV RBC AUTO: 85.2 FL (ref 80–99.9)
MONOCYTES NFR BLD: 0.82 K/UL (ref 0.1–0.95)
MONOCYTES NFR BLD: 12 % (ref 2–12)
NEUTROPHILS NFR BLD: 66 % (ref 43–80)
NEUTS SEG NFR BLD: 4.48 K/UL (ref 1.8–7.3)
PLATELET # BLD AUTO: 222 K/UL (ref 130–450)
PMV BLD AUTO: 12 FL (ref 7–12)
POTASSIUM SERPL-SCNC: 4.3 MMOL/L (ref 3.5–5)
PROT SERPL-MCNC: 8.1 G/DL (ref 6.4–8.3)
RBC # BLD AUTO: 4.11 M/UL (ref 3.5–5.5)
SODIUM SERPL-SCNC: 136 MMOL/L (ref 132–146)
TIBC SERPL-MCNC: 291 UG/DL (ref 250–450)
WBC OTHER # BLD: 6.8 K/UL (ref 4.5–11.5)

## 2025-03-14 PROCEDURE — 82728 ASSAY OF FERRITIN: CPT

## 2025-03-14 PROCEDURE — 36415 COLL VENOUS BLD VENIPUNCTURE: CPT

## 2025-03-14 PROCEDURE — 80053 COMPREHEN METABOLIC PANEL: CPT

## 2025-03-14 PROCEDURE — 85025 COMPLETE CBC W/AUTO DIFF WBC: CPT

## 2025-03-14 PROCEDURE — 83540 ASSAY OF IRON: CPT

## 2025-03-14 PROCEDURE — 83550 IRON BINDING TEST: CPT

## 2025-03-14 NOTE — PROGRESS NOTES
[facility] PHYSICIANS [facility] CARE [facility] MED ONCOLOGY  [address]  [facility] [address]  Dept: [phone]  Loc: [phone]  Attending Progress note      Reason for Visit:   Left Breast DCIS.    Referring Physician:  Stacy Levine MD.    PCP:  Lissa Rodriguez DO    History of Present Illness:    The patient is a pleasant 80 y.o. lady, with a past medical history significant for hyperlipidemia, and hypertension, who had presented with an abnormal screening Mammogram:  MAMMOGRAM (12/11/2018):  INDICATION:   Screening.       HISTORY:   The patient has no personal history of cancer. The patient has the following family history of breast cancer:  maternal aunt.       MAMMOGRAM VIEWS:   The following mammographic views where obtained: bilateral craniocaudal; bilateral craniocaudal with tomosynthesis; bilateral mediolateral oblique; and bilateral mediolateral oblique with tomosynthesis       TOMOSYNTHESIS:   Tomosynthesis (3 Dimensional Breast Imaging) was used on this examination to aid in evaluation.       COMPARISON:   No prior imaging studies are available for comparison.       CAD:   This exam was reviewed using the [employer] Computer Aided Detection (CAD)       TISSUE DENSITY:   The breasts are heterogeneously dense (Type 3 density).       MAMMOGRAM FINDINGS:   In the right breast, no suspicious masses, areas of suspicious architectural distortion, suspicious calcifications, or additional suspicious findings are identified.           Finding 1:   There are coarse heterogeneous and fine pleomorphic calcifications measuring 17 mm seen in the upper outer quadrant of the left breast.       IMPRESSION:   Calcifications in the left breast require additional evaluation.   Spot magnification views with consideration for a stereotactic biopsy if needed.       =======================================   BI-RADS Category 0:  Incomplete: Need Additional Imaging Evaluation

## 2025-04-09 DIAGNOSIS — I10 ESSENTIAL HYPERTENSION: ICD-10-CM

## 2025-04-09 DIAGNOSIS — E78.2 MIXED HYPERLIPIDEMIA: ICD-10-CM

## 2025-04-09 RX ORDER — LOSARTAN POTASSIUM 100 MG/1
100 TABLET ORAL DAILY
Qty: 90 TABLET | Refills: 0 | Status: SHIPPED | OUTPATIENT
Start: 2025-04-09

## 2025-04-09 RX ORDER — ATORVASTATIN CALCIUM 40 MG/1
40 TABLET, FILM COATED ORAL DAILY
Qty: 90 TABLET | Refills: 0 | Status: SHIPPED | OUTPATIENT
Start: 2025-04-09

## 2025-04-09 RX ORDER — CHLORTHALIDONE 25 MG/1
25 TABLET ORAL DAILY
Qty: 90 TABLET | Refills: 0 | Status: SHIPPED | OUTPATIENT
Start: 2025-04-09

## 2025-04-09 RX ORDER — POTASSIUM CHLORIDE 1500 MG/1
20 TABLET, EXTENDED RELEASE ORAL DAILY
Qty: 90 TABLET | Refills: 0 | Status: SHIPPED | OUTPATIENT
Start: 2025-04-09

## 2025-04-09 NOTE — TELEPHONE ENCOUNTER
Name of Medication(s) Requested:  Requested Prescriptions     Pending Prescriptions Disp Refills    losartan (COZAAR) 100 MG tablet 90 tablet 1     Sig: Take 1 tablet by mouth daily    chlorthalidone (HYGROTON) 25 MG tablet 90 tablet 1     Sig: Take 1 tablet by mouth daily    atorvastatin (LIPITOR) 40 MG tablet 90 tablet 1     Sig: Take 1 tablet by mouth daily    potassium chloride (KLOR-CON M) 20 MEQ extended release tablet 90 tablet 1     Sig: Take 1 tablet by mouth daily       Medication is on current medication list Yes    Dosage and directions were verified? Yes    Quantity verified: 90 day supply     Pharmacy Verified?  Yes    Last Appointment:  3/6/2025 - return in 3 months     Future appts:  Future Appointments   Date Time Provider Department Center   6/13/2025 11:00 AM [facility] MED ONC FAST TRACK 1 [facility] Med Onc [facility]   6/13/2025 11:15 AM Greer Díaz MD MED ONC [facility]   3/5/2026 10:00 AM LUDY ACOSTA [facility] RM 1 LUDY ACOSTA [facility] Rad/Car   3/5/2026 11:00 AM Paris Madrid, APRN - CNP [facility]        (If no appt send self scheduling link. .REFILLAPPT)  Scheduling request sent?     [] Yes  [x] No    Does patient need updated?  [] Yes  [x] No

## 2025-06-05 ENCOUNTER — OFFICE VISIT (OUTPATIENT)
Dept: FAMILY MEDICINE CLINIC | Age: 80
End: 2025-06-05

## 2025-06-05 VITALS
BODY MASS INDEX: 30.86 KG/M2 | WEIGHT: 147 LBS | RESPIRATION RATE: 15 BRPM | DIASTOLIC BLOOD PRESSURE: 70 MMHG | HEART RATE: 98 BPM | TEMPERATURE: 97.5 F | OXYGEN SATURATION: 98 % | HEIGHT: 58 IN | SYSTOLIC BLOOD PRESSURE: 118 MMHG

## 2025-06-05 DIAGNOSIS — R09.82 POSTNASAL DRIP: ICD-10-CM

## 2025-06-05 DIAGNOSIS — J06.9 VIRAL URI: ICD-10-CM

## 2025-06-05 DIAGNOSIS — I10 ESSENTIAL HYPERTENSION: ICD-10-CM

## 2025-06-05 RX ORDER — FEXOFENADINE HCL 180 MG/1
180 TABLET ORAL DAILY
Qty: 30 TABLET | Refills: 0 | Status: SHIPPED | OUTPATIENT
Start: 2025-06-05 | End: 2025-07-05

## 2025-06-05 NOTE — PATIENT INSTRUCTIONS
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 I'm excited to announce I will be joining [facility] starting in September.     [address] [place], [place] [address]  Tel: [phone]    Until then, continue to call [facility] ([phone]) to make an appointment or request medication refills.     Best,   Dr. Rodriguez

## 2025-06-05 NOTE — PROGRESS NOTES
[facility] Residency [facility]    CC: Joan Staley is a 80 y.o. yo female is here for evaluation evaluation for the following medical concerns: Hypertension and Congestion (About 3 days. )      HPI:    Congestion, sore throat, runny nose:  X3 days  Denies fevers, chills  Took Zyrtec x1  Eating and drinking baseline  POCT covid/flu negative    HTN:  Losartan 100 mg daily, chlorthalidone 25 mg daily  Denies chest pain, shortness of breath, dizziness, lightheadedness, vision changes, or headache.     ROS as above.    Vitals:   /70   Pulse 98   Temp 97.5 °F (36.4 °C) (Temporal)   Resp 15   Ht 1.473 m (4' 9.99\")   Wt 66.7 kg (147 lb)   LMP  (LMP Unknown)   SpO2 98%   BMI 30.73 kg/m²   Wt Readings from Last 3 Encounters:   06/05/25 66.7 kg (147 lb)   03/14/25 66.8 kg (147 lb 4.8 oz)   03/06/25 66.7 kg (147 lb)       PE:  Physical Exam  Constitutional:       General: She is not in acute distress.  HENT:      Head: Normocephalic and atraumatic.      Mouth/Throat:      Comments: Cobblestoning posterior oropharynx  Eyes:      Extraocular Movements: Extraocular movements intact.   Cardiovascular:      Rate and Rhythm: Normal rate and regular rhythm.      Heart sounds: No murmur heard.     No friction rub. No gallop.   Pulmonary:      Breath sounds: Normal breath sounds. No wheezing, rhonchi or rales.   Abdominal:      Tenderness: There is no abdominal tenderness. There is no guarding.   Musculoskeletal:      Right lower leg: No edema.      Left lower leg: No edema.   Neurological:      Mental Status: She is alert.         A / P:     Diagnosis Orders   1. Viral URI  fexofenadine (ALLEGRA) 180 MG tablet      2. Postnasal drip  fexofenadine (ALLEGRA) 180 MG tablet      3. Essential hypertension          Stop Zyrtec, start Allegra    Continue chlorthalidone 25 mg daily, losartan 100 mg daily, BP stable    RTO: Return in about 5 months (around 11/5/2025) for awv.    Call or go to ED immediately

## 2025-06-05 NOTE — PROGRESS NOTES
[facility]  Pre[facility]ing [facility]    Subjective:  Follow up   Tolerating antihypertensives  Congestion and sore throat x 3 days   Some improvement with Zyrtec     ROS otherwise negative     Past medical, surgical, family and social history were reviewed, non-contributory, and unchanged unless otherwise stated.    Objective:    /70   Pulse 98   Temp 97.5 °F (36.4 °C) (Temporal)   Resp 15   Ht 1.473 m (4' 9.99\")   Wt 66.7 kg (147 lb)   LMP  (LMP Unknown)   SpO2 98%   BMI 30.73 kg/m²     Exam is as noted by resident with the following changes, additions or corrections:      Assessment/Plan:  Viral URI- Covid and flu negative.Trial Allegra  HTN- cont same regimen   Follow up     Attending Physician Statement  I have reviewed the chart, including any radiology or labs. I have discussed the case, including pertinent history and exam findings with the resident.  I agree with the assessment, plan and orders as documented by the resident.  Please refer to the resident note for additional information.      Electronically signed by Vahe Rocha MD on 6/5/2025 at 1:55 PM

## 2025-06-25 DIAGNOSIS — E78.2 MIXED HYPERLIPIDEMIA: ICD-10-CM

## 2025-06-25 RX ORDER — ATORVASTATIN CALCIUM 40 MG/1
40 TABLET, FILM COATED ORAL DAILY
Qty: 90 TABLET | Refills: 1 | Status: SHIPPED | OUTPATIENT
Start: 2025-06-25

## 2025-07-26 DIAGNOSIS — I10 ESSENTIAL HYPERTENSION: ICD-10-CM

## 2025-07-28 RX ORDER — POTASSIUM CHLORIDE 1500 MG/1
20 TABLET, EXTENDED RELEASE ORAL DAILY
Qty: 90 TABLET | Refills: 0 | Status: SHIPPED | OUTPATIENT
Start: 2025-07-28

## 2025-07-28 NOTE — TELEPHONE ENCOUNTER
Name of Medication(s) Requested:  Requested Prescriptions     Pending Prescriptions Disp Refills    potassium chloride (KLOR-CON M) 20 MEQ extended release tablet [Pharmacy Med Name: POTASSIUM CL 20MEQ ER TABLETS] 90 tablet 0     Sig: TAKE 1 TABLET BY MOUTH DAILY       Medication is on current medication list Yes    Dosage and directions were verified? Yes    Quantity verified: 90 day supply     Pharmacy Verified?  Yes    Last Appointment:  Visit date not found    Future appts:  Future Appointments   Date Time Provider Department Center   8/6/2025 11:00 AM SEY[facility] MED ONC FAST TRACK 2 SEYZ Med Onc [facility]   8/6/2025 11:15 AM Greer Díaz MD MED ONC [facility]   3/5/2026 10:00 AM YZ DAVE MISSAEL RM 1 YZ DAVE BC [facility] Rad/Car   3/5/2026 11:00 AM Paris Madrid, APRN - CNP [facility]        (If no appt send self scheduling link. .REFILLAPPT)  Scheduling request sent?     [] Yes  [] No    Does patient need updated?  [] Yes  [] No  
FAMILY HISTORY:  No pertinent family history in first degree relatives

## 2025-08-04 DIAGNOSIS — D05.12 DUCTAL CARCINOMA IN SITU (DCIS) OF LEFT BREAST: Primary | ICD-10-CM

## 2025-08-06 ENCOUNTER — HOSPITAL ENCOUNTER (OUTPATIENT)
Dept: INFUSION THERAPY | Age: 80
Discharge: HOME | End: 2025-08-06
Payer: MEDICARE

## 2025-08-06 ENCOUNTER — OFFICE VISIT (OUTPATIENT)
Age: 80
End: 2025-08-06
Payer: MEDICARE

## 2025-08-06 VITALS
HEART RATE: 71 BPM | OXYGEN SATURATION: 100 % | DIASTOLIC BLOOD PRESSURE: 67 MMHG | WEIGHT: 149.6 LBS | BODY MASS INDEX: 31.4 KG/M2 | SYSTOLIC BLOOD PRESSURE: 145 MMHG | HEIGHT: 58 IN | TEMPERATURE: 98.1 F

## 2025-08-06 DIAGNOSIS — D64.9 ANEMIA, UNSPECIFIED TYPE: Primary | ICD-10-CM

## 2025-08-06 DIAGNOSIS — D05.12 DUCTAL CARCINOMA IN SITU (DCIS) OF LEFT BREAST: ICD-10-CM

## 2025-08-06 DIAGNOSIS — D64.9 ANEMIA, UNSPECIFIED TYPE: ICD-10-CM

## 2025-08-06 DIAGNOSIS — Z78.0 POSTMENOPAUSAL: ICD-10-CM

## 2025-08-06 LAB
BASOPHILS # BLD: 0.08 K/UL (ref 0–0.2)
BASOPHILS NFR BLD: 2 % (ref 0–2)
EOSINOPHIL # BLD: 0.11 K/UL (ref 0.05–0.5)
EOSINOPHILS RELATIVE PERCENT: 2 % (ref 0–6)
ERYTHROCYTE [DISTWIDTH] IN BLOOD BY AUTOMATED COUNT: 14.9 % (ref 11.5–15)
FERRITIN SERPL-MCNC: 236 NG/ML
HCT VFR BLD AUTO: 35.6 % (ref 34–48)
HGB BLD-MCNC: 11.5 G/DL (ref 11.5–15.5)
IMM GRANULOCYTES # BLD AUTO: <0.03 K/UL (ref 0–0.58)
IMM GRANULOCYTES NFR BLD: 0 % (ref 0–5)
IRON SATN MFR SERPL: 20 % (ref 15–50)
IRON SERPL-MCNC: 55 UG/DL (ref 37–145)
LYMPHOCYTES NFR BLD: 1.21 K/UL (ref 1.5–4)
LYMPHOCYTES RELATIVE PERCENT: 27 % (ref 20–42)
MCH RBC QN AUTO: 27.8 PG (ref 26–35)
MCHC RBC AUTO-ENTMCNC: 32.3 G/DL (ref 32–34.5)
MCV RBC AUTO: 86 FL (ref 80–99.9)
MONOCYTES NFR BLD: 0.56 K/UL (ref 0.1–0.95)
MONOCYTES NFR BLD: 12 % (ref 2–12)
NEUTROPHILS NFR BLD: 57 % (ref 43–80)
NEUTS SEG NFR BLD: 2.59 K/UL (ref 1.8–7.3)
PLATELET # BLD AUTO: 174 K/UL (ref 130–450)
PMV BLD AUTO: 12.3 FL (ref 7–12)
RBC # BLD AUTO: 4.14 M/UL (ref 3.5–5.5)
TIBC SERPL-MCNC: 281 UG/DL (ref 250–450)
WBC OTHER # BLD: 4.6 K/UL (ref 4.5–11.5)

## 2025-08-06 PROCEDURE — 3077F SYST BP >= 140 MM HG: CPT | Performed by: INTERNAL MEDICINE

## 2025-08-06 PROCEDURE — 3078F DIAST BP <80 MM HG: CPT | Performed by: INTERNAL MEDICINE

## 2025-08-06 PROCEDURE — 82728 ASSAY OF FERRITIN: CPT

## 2025-08-06 PROCEDURE — 36415 COLL VENOUS BLD VENIPUNCTURE: CPT

## 2025-08-06 PROCEDURE — 1126F AMNT PAIN NOTED NONE PRSNT: CPT | Performed by: INTERNAL MEDICINE

## 2025-08-06 PROCEDURE — 83540 ASSAY OF IRON: CPT

## 2025-08-06 PROCEDURE — 99214 OFFICE O/P EST MOD 30 MIN: CPT | Performed by: INTERNAL MEDICINE

## 2025-08-06 PROCEDURE — 1160F RVW MEDS BY RX/DR IN RCRD: CPT | Performed by: INTERNAL MEDICINE

## 2025-08-06 PROCEDURE — 85025 COMPLETE CBC W/AUTO DIFF WBC: CPT

## 2025-08-06 PROCEDURE — 83550 IRON BINDING TEST: CPT

## 2025-08-06 PROCEDURE — 1123F ACP DISCUSS/DSCN MKR DOCD: CPT | Performed by: INTERNAL MEDICINE

## 2025-08-06 PROCEDURE — 1159F MED LIST DOCD IN RCRD: CPT | Performed by: INTERNAL MEDICINE

## (undated) DEVICE — TOWEL,OR,DSP,ST,BLUE,STD,6/PK,12PK/CS: Brand: MEDLINE

## (undated) DEVICE — STANDARD HYPODERMIC NEEDLE,ALUMINUM HUB: Brand: MONOJECT

## (undated) DEVICE — CONTROL SYRINGE LUER-LOCK TIP: Brand: MONOJECT

## (undated) DEVICE — PATIENT RETURN ELECTRODE, SINGLE-USE, CONTACT QUALITY MONITORING, ADULT, WITH 9FT CORD, FOR PATIENTS WEIGING OVER 33LBS. (15KG): Brand: MEGADYNE

## (undated) DEVICE — RETRACTOR 50-101-1 RADIALUX US: Brand: RADIALUX™

## (undated) DEVICE — MARKER SURG MARGIN STD 6 CLR INK ASST CORR CLP

## (undated) DEVICE — CHLORAPREP 26ML ORANGE

## (undated) DEVICE — GLOVE ORANGE PI 7   MSG9070

## (undated) DEVICE — SHEARS ENDOSCP L9CM CRV HARM FOCS +

## (undated) DEVICE — DRAPE THER FLUID WARMING 66X44 IN FLAT SLUSH DBL DISC ORS

## (undated) DEVICE — DRAPE,REIN 53X77,STERILE: Brand: MEDLINE

## (undated) DEVICE — PLASMABLADE PS210-030S 3.0S LOCK: Brand: PLASMABLADE™

## (undated) DEVICE — SET INST MINOR PROCEDURE

## (undated) DEVICE — 1.5L THIN WALL CAN: Brand: CRD

## (undated) DEVICE — PACK,UNIV, II AURORA: Brand: MEDLINE

## (undated) DEVICE — SKIN AFFIX SURG ADHESIVE 72/CS 0.55ML: Brand: MEDLINE

## (undated) DEVICE — 4-PORT MANIFOLD: Brand: NEPTUNE 2

## (undated) DEVICE — TUBING, SUCTION, 3/16" X 12', STRAIGHT: Brand: MEDLINE

## (undated) DEVICE — APPLIER LIG CLP M L11IN TI STR RNG HNDL FOR 20 CLP DISP

## (undated) DEVICE — DRESSING FOAM W22XL25CM FILVE LAYR FOAM DP DEF SAFETAC

## (undated) DEVICE — Device

## (undated) DEVICE — GLOVE SURG SZ 65 THK91MIL LTX FREE SYN POLYISOPRENE

## (undated) DEVICE — GARMENT,MEDLINE,DVT,INT,CALF,MED, GEN2: Brand: MEDLINE

## (undated) DEVICE — SURGICAL PROCEDURE PACK BASIC

## (undated) DEVICE — GOWN,SIRUS,FABRNF,XL,20/CS: Brand: MEDLINE

## (undated) DEVICE — COVER US PRB W5XL96IN LTX W/ GEL